# Patient Record
Sex: FEMALE | Race: WHITE | NOT HISPANIC OR LATINO | URBAN - METROPOLITAN AREA
[De-identification: names, ages, dates, MRNs, and addresses within clinical notes are randomized per-mention and may not be internally consistent; named-entity substitution may affect disease eponyms.]

---

## 2017-02-02 ENCOUNTER — OUTPATIENT (OUTPATIENT)
Dept: OUTPATIENT SERVICES | Facility: HOSPITAL | Age: 77
LOS: 1 days | End: 2017-02-02
Payer: MEDICARE

## 2017-02-02 ENCOUNTER — APPOINTMENT (OUTPATIENT)
Dept: ULTRASOUND IMAGING | Facility: IMAGING CENTER | Age: 77
End: 2017-02-02

## 2017-02-02 DIAGNOSIS — Z00.8 ENCOUNTER FOR OTHER GENERAL EXAMINATION: ICD-10-CM

## 2017-02-02 PROBLEM — Z00.00 ENCOUNTER FOR PREVENTIVE HEALTH EXAMINATION: Status: ACTIVE | Noted: 2017-02-02

## 2017-02-02 PROCEDURE — 93970 EXTREMITY STUDY: CPT

## 2017-02-21 ENCOUNTER — APPOINTMENT (OUTPATIENT)
Dept: VASCULAR SURGERY | Facility: CLINIC | Age: 77
End: 2017-02-21

## 2017-02-21 VITALS
HEIGHT: 66 IN | BODY MASS INDEX: 31.34 KG/M2 | DIASTOLIC BLOOD PRESSURE: 73 MMHG | WEIGHT: 195 LBS | TEMPERATURE: 97.6 F | HEART RATE: 77 BPM | SYSTOLIC BLOOD PRESSURE: 145 MMHG

## 2017-02-21 RX ORDER — DILTIAZEM HYDROCHLORIDE 180 MG/1
180 CAPSULE, COATED, EXTENDED RELEASE ORAL
Refills: 0 | Status: ACTIVE | COMMUNITY

## 2017-02-21 RX ORDER — ASPIRIN 81 MG
81 TABLET, DELAYED RELEASE (ENTERIC COATED) ORAL
Refills: 0 | Status: ACTIVE | COMMUNITY

## 2017-02-21 RX ORDER — OMEPRAZOLE MAGNESIUM 10 MG/1
GRANULE, DELAYED RELEASE ORAL
Refills: 0 | Status: ACTIVE | COMMUNITY

## 2017-02-21 RX ORDER — POTASSIUM CHLORIDE 1500 MG/1
20 TABLET, FILM COATED, EXTENDED RELEASE ORAL
Refills: 0 | Status: ACTIVE | COMMUNITY

## 2017-02-21 RX ORDER — OXYCODONE HYDROCHLORIDE AND ACETAMINOPHEN 10; 325 MG/1; MG/1
TABLET ORAL
Refills: 0 | Status: ACTIVE | COMMUNITY

## 2017-02-21 RX ORDER — PRAMIPEXOLE DIHYDROCHLORIDE 0.5 MG/1
0.5 TABLET ORAL
Refills: 0 | Status: ACTIVE | COMMUNITY

## 2017-02-21 RX ORDER — DULOXETINE HYDROCHLORIDE 60 MG/1
60 CAPSULE, DELAYED RELEASE ORAL
Refills: 0 | Status: ACTIVE | COMMUNITY

## 2017-02-21 RX ORDER — FUROSEMIDE 40 MG/1
40 TABLET ORAL
Refills: 0 | Status: ACTIVE | COMMUNITY

## 2017-03-02 ENCOUNTER — APPOINTMENT (OUTPATIENT)
Dept: CT IMAGING | Facility: IMAGING CENTER | Age: 77
End: 2017-03-02

## 2017-08-08 ENCOUNTER — INPATIENT (INPATIENT)
Facility: HOSPITAL | Age: 77
LOS: 2 days | Discharge: HOME | DRG: 301 | End: 2017-08-11
Attending: INTERNAL MEDICINE | Admitting: INTERNAL MEDICINE
Payer: MEDICARE

## 2017-08-08 VITALS
SYSTOLIC BLOOD PRESSURE: 141 MMHG | RESPIRATION RATE: 18 BRPM | OXYGEN SATURATION: 95 % | HEART RATE: 86 BPM | DIASTOLIC BLOOD PRESSURE: 79 MMHG | WEIGHT: 160.06 LBS | TEMPERATURE: 98 F

## 2017-08-08 DIAGNOSIS — Z98.890 OTHER SPECIFIED POSTPROCEDURAL STATES: Chronic | ICD-10-CM

## 2017-08-08 DIAGNOSIS — Z90.710 ACQUIRED ABSENCE OF BOTH CERVIX AND UTERUS: Chronic | ICD-10-CM

## 2017-08-08 DIAGNOSIS — Z98.891 HISTORY OF UTERINE SCAR FROM PREVIOUS SURGERY: Chronic | ICD-10-CM

## 2017-08-08 DIAGNOSIS — Z96.649 PRESENCE OF UNSPECIFIED ARTIFICIAL HIP JOINT: Chronic | ICD-10-CM

## 2017-08-08 DIAGNOSIS — Z90.11 ACQUIRED ABSENCE OF RIGHT BREAST AND NIPPLE: Chronic | ICD-10-CM

## 2017-08-08 DIAGNOSIS — H26.9 UNSPECIFIED CATARACT: Chronic | ICD-10-CM

## 2017-08-08 DIAGNOSIS — L03.90 CELLULITIS, UNSPECIFIED: ICD-10-CM

## 2017-08-08 LAB
ANION GAP SERPL CALC-SCNC: 15 MMOL/L — SIGNIFICANT CHANGE UP (ref 5–17)
BASOPHILS # BLD AUTO: 0 K/UL — SIGNIFICANT CHANGE UP (ref 0–0.2)
BASOPHILS NFR BLD AUTO: 0.2 % — SIGNIFICANT CHANGE UP (ref 0–2)
BUN SERPL-MCNC: 14 MG/DL — SIGNIFICANT CHANGE UP (ref 7–23)
CALCIUM SERPL-MCNC: 9.3 MG/DL — SIGNIFICANT CHANGE UP (ref 8.4–10.5)
CHLORIDE SERPL-SCNC: 100 MMOL/L — SIGNIFICANT CHANGE UP (ref 96–108)
CO2 SERPL-SCNC: 28 MMOL/L — SIGNIFICANT CHANGE UP (ref 22–31)
CREAT SERPL-MCNC: 0.6 MG/DL — SIGNIFICANT CHANGE UP (ref 0.5–1.3)
EOSINOPHIL # BLD AUTO: 0.2 K/UL — SIGNIFICANT CHANGE UP (ref 0–0.5)
EOSINOPHIL NFR BLD AUTO: 1.8 % — SIGNIFICANT CHANGE UP (ref 0–6)
GLUCOSE SERPL-MCNC: 95 MG/DL — SIGNIFICANT CHANGE UP (ref 70–99)
HCT VFR BLD CALC: 36.9 % — SIGNIFICANT CHANGE UP (ref 34.5–45)
HGB BLD-MCNC: 12.3 G/DL — SIGNIFICANT CHANGE UP (ref 11.5–15.5)
LYMPHOCYTES # BLD AUTO: 1.5 K/UL — SIGNIFICANT CHANGE UP (ref 1–3.3)
LYMPHOCYTES # BLD AUTO: 16.6 % — SIGNIFICANT CHANGE UP (ref 13–44)
MCHC RBC-ENTMCNC: 30.5 PG — SIGNIFICANT CHANGE UP (ref 27–34)
MCHC RBC-ENTMCNC: 33.3 GM/DL — SIGNIFICANT CHANGE UP (ref 32–36)
MCV RBC AUTO: 91.6 FL — SIGNIFICANT CHANGE UP (ref 80–100)
MONOCYTES # BLD AUTO: 0.7 K/UL — SIGNIFICANT CHANGE UP (ref 0–0.9)
MONOCYTES NFR BLD AUTO: 8.2 % — SIGNIFICANT CHANGE UP (ref 2–14)
NEUTROPHILS # BLD AUTO: 6.5 K/UL — SIGNIFICANT CHANGE UP (ref 1.8–7.4)
NEUTROPHILS NFR BLD AUTO: 73.2 % — SIGNIFICANT CHANGE UP (ref 43–77)
PLATELET # BLD AUTO: 193 K/UL — SIGNIFICANT CHANGE UP (ref 150–400)
POTASSIUM SERPL-MCNC: 3.3 MMOL/L — LOW (ref 3.5–5.3)
POTASSIUM SERPL-SCNC: 3.3 MMOL/L — LOW (ref 3.5–5.3)
RBC # BLD: 4.02 M/UL — SIGNIFICANT CHANGE UP (ref 3.8–5.2)
RBC # FLD: 12.7 % — SIGNIFICANT CHANGE UP (ref 10.3–14.5)
SODIUM SERPL-SCNC: 143 MMOL/L — SIGNIFICANT CHANGE UP (ref 135–145)
WBC # BLD: 8.9 K/UL — SIGNIFICANT CHANGE UP (ref 3.8–10.5)
WBC # FLD AUTO: 8.9 K/UL — SIGNIFICANT CHANGE UP (ref 3.8–10.5)

## 2017-08-08 PROCEDURE — 99223 1ST HOSP IP/OBS HIGH 75: CPT

## 2017-08-08 PROCEDURE — 99285 EMERGENCY DEPT VISIT HI MDM: CPT | Mod: GC

## 2017-08-08 RX ORDER — ACETAMINOPHEN 500 MG
975 TABLET ORAL ONCE
Qty: 0 | Refills: 0 | Status: COMPLETED | OUTPATIENT
Start: 2017-08-08 | End: 2017-08-08

## 2017-08-08 RX ORDER — CEFAZOLIN SODIUM 1 G
VIAL (EA) INJECTION
Qty: 0 | Refills: 0 | Status: DISCONTINUED | OUTPATIENT
Start: 2017-08-08 | End: 2017-08-08

## 2017-08-08 RX ORDER — CEFAZOLIN SODIUM 1 G
1000 VIAL (EA) INJECTION ONCE
Qty: 0 | Refills: 0 | Status: COMPLETED | OUTPATIENT
Start: 2017-08-08 | End: 2017-08-08

## 2017-08-08 RX ORDER — OXYCODONE HYDROCHLORIDE 5 MG/1
5 TABLET ORAL ONCE
Qty: 0 | Refills: 0 | Status: DISCONTINUED | OUTPATIENT
Start: 2017-08-08 | End: 2017-08-08

## 2017-08-08 RX ADMIN — OXYCODONE HYDROCHLORIDE 5 MILLIGRAM(S): 5 TABLET ORAL at 23:55

## 2017-08-08 RX ADMIN — OXYCODONE HYDROCHLORIDE 5 MILLIGRAM(S): 5 TABLET ORAL at 23:25

## 2017-08-08 RX ADMIN — Medication 100 MILLIGRAM(S): at 22:23

## 2017-08-08 NOTE — ED PROVIDER NOTE - NS ED ROS FT
Gen: Denies fevers, chills  HEENT: Denies oral ulcers  CV: Denies chest pain, palpitations  Resp: Denies SOB  Abd: Denies abdominal pain, N/V, melena  Skin: Denies rashes  Ext: SEE HPI  Neuro: Denies headaches, visual changes  Psych: Denies depression  Endo: Denies heat intolerance

## 2017-08-08 NOTE — ED PROVIDER NOTE - MEDICAL DECISION MAKING DETAILS
78yo F with chronic LE edema of unclear etiology, p/w erythema and worsening edema x 2 weeks. LLE> RLE, well-demarcated, warm to touch, may be 2/2 lymphedema vs. stasis dermatitis, but also concern for cellulitis vs. erysipelas (given bilaterality, less likely but given clinical sxs and appearance, cannot r/o). No purulence or ulcers. Will start ancef 1g. Discussed with Dr. Jaramillo, admit to medicine.

## 2017-08-08 NOTE — H&P ADULT - NSHPSOCIALHISTORY_GEN_ALL_CORE
- lives with - lives with  and daughter, retired manager of [employer]  - former 60 pack year smoker, quit 1993, social EtOH

## 2017-08-08 NOTE — ED PROVIDER NOTE - OBJECTIVE STATEMENT
76yo F severe bilateral knee arthritis, scoliosis, chronic LE edema x 1 year, p/w bilateral LE redness and worsening swelling x 2 weeks. Pt has chronic LE edema on lasix, dopplers in Feb 2017 without DVT, seen by Vascular Sx Dr. Burgos at the time. Pt states etiology of LE edema unknown, was told it is likely from severe knee arthritis. States LE redness has been progressing for the past 2 weeks. Has been unable to ambulate independently x 1 year, now uses walker and wheelchair. Denies fevers, chills, nausea, vomiting.  Sent by PMD today.    PMD: Dr. Nydia Jaramillo

## 2017-08-08 NOTE — H&P ADULT - FAMILY HISTORY
Sibling  Still living? Yes, Estimated age: Age Unknown  Family history of breast cancer, Age at diagnosis: Age Unknown  Family history of Hodgkin's disease, Age at diagnosis: Age Unknown     Father  Still living? No  Family history of emphysema, Age at diagnosis: Age Unknown     Mother  Still living? No  Family history of lung cancer, Age at diagnosis: Age Unknown

## 2017-08-08 NOTE — ED ADULT NURSE NOTE - OBJECTIVE STATEMENT
Pt 76 yo F arrived w/ via wheelchair from triage c/o of BLE swelling x2 wks has been getting increasingly worst. Pt LE edema  on lasix  x 1 yr now. Pt u Pt 76 yo F arrived w/ via wheelchair from triage c/o of BLE swelling x2 wks has been getting increasingly worst. Pt LE edema  on lasix  x 1 yr now. Pt is being followed by Vascular MD. Reports having Scoliosis and Knee arthritis. C/o of pain and takes oxycodone at home. Denies Cp dizziness weakness or SOB. No fever or chills. Denies NVD. Pmh of mastectomy on L side. MD at bedside. No acute distress noted. Labs drawn, and Cultures sent. IV line in place.

## 2017-08-08 NOTE — H&P ADULT - PROBLEM SELECTOR PLAN 1
last hospital admission 8 yrs ago for mastectomy, no exposure to healthcare facility  - will treat with Ancef for now and monitor for clinical response, will consider broad spectrum if no response  - will monitor BCx if febrile

## 2017-08-08 NOTE — H&P ADULT - PROBLEM SELECTOR PLAN 4
resulting in shortening of LLE significantly, and chronic pain   - s/p epidural early july, pain free for 2 wks  - will treat with Cymbalta, Oxycodone 10 QID prn for now with bowel regimen

## 2017-08-08 NOTE — ED ADULT NURSE NOTE - CAS EDP DISCH DISPOSITION ADMI
Nurse call patient contacted regarding appointment on 1/20/17.  Patient seeing Dr. Cormier for a consultation request from Dr. PAYAL Milian for environmental allergies.  Asthma.  Medications to hold prior to appointment reviewed in the event that the physician recommends allergy skin testing.  Advised patient to arrive at 0845 so that they will be ready to be seen by the doctor at the 0900 appointment time.  Advised patient to bring completed health history packet to appointment.  Clinic location confirmed.          [facility]

## 2017-08-08 NOTE — H&P ADULT - EXTREMITIES COMMENTS
erythema from ankle towards knee L>R, LLE larger and longer than RLE (chronic per patient due to scoliosis)

## 2017-08-08 NOTE — H&P ADULT - PROBLEM SELECTOR PLAN 2
- will treat with Lasix. elevation - will treat with Lasix. elevation  - LLE > RLE, chronic per patient, but Hx remote PE, breast ca and mobility limited due to LE edema for the past year.  Will check LE doppler to r/o DVT

## 2017-08-08 NOTE — ED ADULT NURSE NOTE - PSH
Cataract    H/O  section    H/O mastectomy, right    H/O shoulder surgery    H/O: hysterectomy    History of hip replacement

## 2017-08-08 NOTE — ED ADULT NURSE REASSESSMENT NOTE - NS ED NURSE REASSESS COMMENT FT1
pt aaox4 no acute distress. Refused tylenol. Labs and cultures sent. Report given to RENÉE garcía. Patient taken by transport in stable condition.

## 2017-08-08 NOTE — ED ADULT NURSE NOTE - CHPI ED SYMPTOMS NEG
no chills/no cough/no shortness of breath/no diarrhea/no vomiting/no rash/no decreased eating/drinking/no abdominal pain/no headache/no fever

## 2017-08-08 NOTE — ED PROVIDER NOTE - PHYSICAL EXAMINATION
Gen: NAD  HEENT: moist mucus membranes, no oral ulcers  CV: +S1S2 RRR no murmurs, rubs or gallops  Chest: CTA bilaterally  Abd: BS+ in all 4 quadrants, Soft, NTND  Ext: LLE > RLE, LLE with 3+ pitting edema, RLE with 2+ pitting edema, erythema in bilateral legs to mid-leg, warm to touch, tender to touch, no crepitus, no ulcers, no purulence.   Neuro: AAOx3

## 2017-08-08 NOTE — H&P ADULT - HISTORY OF PRESENT ILLNESS
76yo F severe bilateral knee arthritis, scoliosis, chronic LE edema x 1 year, p/w bilateral LE redness and worsening swelling x 2 weeks. Pt has chronic LE edema on lasix, dopplers in Feb 2017 without DVT, seen by Vascular Sx Dr. Burgos at the time. Pt states etiology of LE edema unknown, was told it is likely from severe knee arthritis. States LE redness has been progressing for the past 2 weeks. Has been unable to ambulate independently x 1 year, now uses walker and wheelchair. Denies fevers, chills, nausea, vomiting.  Sent by PMD today 78yo F bilateral knee arthritis,  severe scoliosis resulting in LLE short leg, chronic LE edema x 1 year, HTN, remote breast ca s/p mastectomy and LN resection, provoked PE 1993 p/w worsening bilateral LE redness, swelling and pain x 2 weeks. Pt has chronic LE edema on lasix, dopplers in Feb 2017 without DVT, seen by Vascular Sx Dr. Burgos, etiology unclear. Patient stated to have epidural injection July 6, felt great, but noted LE edema with serous discharge from water blisters.  Then, LE redness began at the ankle and progressed up for the past 2 weeks. Patient has been unable to ambulate independently x 1 year, now uses walker and wheelchair. Denies fevers, chills, nausea, vomiting.  Daughter called PMD who sent the patient to ED for further evaluation.

## 2017-08-09 DIAGNOSIS — I10 ESSENTIAL (PRIMARY) HYPERTENSION: ICD-10-CM

## 2017-08-09 DIAGNOSIS — L03.119 CELLULITIS OF UNSPECIFIED PART OF LIMB: ICD-10-CM

## 2017-08-09 DIAGNOSIS — R60.0 LOCALIZED EDEMA: ICD-10-CM

## 2017-08-09 DIAGNOSIS — Z29.9 ENCOUNTER FOR PROPHYLACTIC MEASURES, UNSPECIFIED: ICD-10-CM

## 2017-08-09 DIAGNOSIS — M41.9 SCOLIOSIS, UNSPECIFIED: ICD-10-CM

## 2017-08-09 DIAGNOSIS — C50.919 MALIGNANT NEOPLASM OF UNSPECIFIED SITE OF UNSPECIFIED FEMALE BREAST: ICD-10-CM

## 2017-08-09 DIAGNOSIS — K59.00 CONSTIPATION, UNSPECIFIED: ICD-10-CM

## 2017-08-09 LAB
ANION GAP SERPL CALC-SCNC: 14 MMOL/L — SIGNIFICANT CHANGE UP (ref 5–17)
BASOPHILS # BLD AUTO: 0.01 K/UL — SIGNIFICANT CHANGE UP (ref 0–0.2)
BASOPHILS NFR BLD AUTO: 0.2 % — SIGNIFICANT CHANGE UP (ref 0–2)
BUN SERPL-MCNC: 10 MG/DL — SIGNIFICANT CHANGE UP (ref 7–23)
CALCIUM SERPL-MCNC: 8.8 MG/DL — SIGNIFICANT CHANGE UP (ref 8.4–10.5)
CHLORIDE SERPL-SCNC: 97 MMOL/L — SIGNIFICANT CHANGE UP (ref 96–108)
CO2 SERPL-SCNC: 29 MMOL/L — SIGNIFICANT CHANGE UP (ref 22–31)
CREAT SERPL-MCNC: 0.59 MG/DL — SIGNIFICANT CHANGE UP (ref 0.5–1.3)
EOSINOPHIL # BLD AUTO: 0.17 K/UL — SIGNIFICANT CHANGE UP (ref 0–0.5)
EOSINOPHIL NFR BLD AUTO: 2.7 % — SIGNIFICANT CHANGE UP (ref 0–6)
GLUCOSE SERPL-MCNC: 119 MG/DL — HIGH (ref 70–99)
HCT VFR BLD CALC: 35.2 % — SIGNIFICANT CHANGE UP (ref 34.5–45)
HGB BLD-MCNC: 11.2 G/DL — LOW (ref 11.5–15.5)
IMM GRANULOCYTES NFR BLD AUTO: 0.2 % — SIGNIFICANT CHANGE UP (ref 0–1.5)
LYMPHOCYTES # BLD AUTO: 1.36 K/UL — SIGNIFICANT CHANGE UP (ref 1–3.3)
LYMPHOCYTES # BLD AUTO: 21.9 % — SIGNIFICANT CHANGE UP (ref 13–44)
MCHC RBC-ENTMCNC: 27.9 PG — SIGNIFICANT CHANGE UP (ref 27–34)
MCHC RBC-ENTMCNC: 31.8 GM/DL — LOW (ref 32–36)
MCV RBC AUTO: 87.8 FL — SIGNIFICANT CHANGE UP (ref 80–100)
MONOCYTES # BLD AUTO: 0.71 K/UL — SIGNIFICANT CHANGE UP (ref 0–0.9)
MONOCYTES NFR BLD AUTO: 11.5 % — SIGNIFICANT CHANGE UP (ref 2–14)
NEUTROPHILS # BLD AUTO: 3.94 K/UL — SIGNIFICANT CHANGE UP (ref 1.8–7.4)
NEUTROPHILS NFR BLD AUTO: 63.5 % — SIGNIFICANT CHANGE UP (ref 43–77)
PLATELET # BLD AUTO: 236 K/UL — SIGNIFICANT CHANGE UP (ref 150–400)
POTASSIUM SERPL-MCNC: 3.3 MMOL/L — LOW (ref 3.5–5.3)
POTASSIUM SERPL-SCNC: 3.3 MMOL/L — LOW (ref 3.5–5.3)
RBC # BLD: 4.01 M/UL — SIGNIFICANT CHANGE UP (ref 3.8–5.2)
RBC # FLD: 14.3 % — SIGNIFICANT CHANGE UP (ref 10.3–14.5)
SODIUM SERPL-SCNC: 140 MMOL/L — SIGNIFICANT CHANGE UP (ref 135–145)
WBC # BLD: 6.2 K/UL — SIGNIFICANT CHANGE UP (ref 3.8–10.5)
WBC # FLD AUTO: 6.2 K/UL — SIGNIFICANT CHANGE UP (ref 3.8–10.5)

## 2017-08-09 PROCEDURE — 93010 ELECTROCARDIOGRAM REPORT: CPT

## 2017-08-09 RX ORDER — BACLOFEN 100 %
0 POWDER (GRAM) MISCELLANEOUS
Qty: 0 | Refills: 0 | COMMUNITY

## 2017-08-09 RX ORDER — BACLOFEN 100 %
10 POWDER (GRAM) MISCELLANEOUS DAILY
Qty: 0 | Refills: 0 | Status: DISCONTINUED | OUTPATIENT
Start: 2017-08-09 | End: 2017-08-11

## 2017-08-09 RX ORDER — OMEPRAZOLE 10 MG/1
0 CAPSULE, DELAYED RELEASE ORAL
Qty: 0 | Refills: 0 | COMMUNITY

## 2017-08-09 RX ORDER — FUROSEMIDE 40 MG
0 TABLET ORAL
Qty: 0 | Refills: 0 | COMMUNITY

## 2017-08-09 RX ORDER — PANTOPRAZOLE SODIUM 20 MG/1
40 TABLET, DELAYED RELEASE ORAL
Qty: 0 | Refills: 0 | Status: DISCONTINUED | OUTPATIENT
Start: 2017-08-09 | End: 2017-08-11

## 2017-08-09 RX ORDER — CEVIMELINE 30 MG/1
0 CAPSULE ORAL
Qty: 0 | Refills: 0 | COMMUNITY

## 2017-08-09 RX ORDER — TRAZODONE HCL 50 MG
0 TABLET ORAL
Qty: 0 | Refills: 0 | COMMUNITY

## 2017-08-09 RX ORDER — DULOXETINE HYDROCHLORIDE 30 MG/1
0 CAPSULE, DELAYED RELEASE ORAL
Qty: 0 | Refills: 0 | COMMUNITY

## 2017-08-09 RX ORDER — OXYCODONE HYDROCHLORIDE 5 MG/1
10 TABLET ORAL EVERY 6 HOURS
Qty: 0 | Refills: 0 | Status: DISCONTINUED | OUTPATIENT
Start: 2017-08-09 | End: 2017-08-11

## 2017-08-09 RX ORDER — DILTIAZEM HCL 120 MG
180 CAPSULE, EXT RELEASE 24 HR ORAL DAILY
Qty: 0 | Refills: 0 | Status: DISCONTINUED | OUTPATIENT
Start: 2017-08-09 | End: 2017-08-11

## 2017-08-09 RX ORDER — ENOXAPARIN SODIUM 100 MG/ML
40 INJECTION SUBCUTANEOUS DAILY
Qty: 0 | Refills: 0 | Status: DISCONTINUED | OUTPATIENT
Start: 2017-08-09 | End: 2017-08-11

## 2017-08-09 RX ORDER — TRAZODONE HCL 50 MG
50 TABLET ORAL AT BEDTIME
Qty: 0 | Refills: 0 | Status: DISCONTINUED | OUTPATIENT
Start: 2017-08-09 | End: 2017-08-11

## 2017-08-09 RX ORDER — ASPIRIN/CALCIUM CARB/MAGNESIUM 324 MG
81 TABLET ORAL DAILY
Qty: 0 | Refills: 0 | Status: DISCONTINUED | OUTPATIENT
Start: 2017-08-09 | End: 2017-08-11

## 2017-08-09 RX ORDER — FUROSEMIDE 40 MG
40 TABLET ORAL DAILY
Qty: 0 | Refills: 0 | Status: DISCONTINUED | OUTPATIENT
Start: 2017-08-09 | End: 2017-08-11

## 2017-08-09 RX ORDER — POTASSIUM CHLORIDE 20 MEQ
40 PACKET (EA) ORAL ONCE
Qty: 0 | Refills: 0 | Status: COMPLETED | OUTPATIENT
Start: 2017-08-09 | End: 2017-08-09

## 2017-08-09 RX ORDER — CEFAZOLIN SODIUM 1 G
1000 VIAL (EA) INJECTION EVERY 8 HOURS
Qty: 0 | Refills: 0 | Status: DISCONTINUED | OUTPATIENT
Start: 2017-08-09 | End: 2017-08-10

## 2017-08-09 RX ORDER — DULOXETINE HYDROCHLORIDE 30 MG/1
60 CAPSULE, DELAYED RELEASE ORAL DAILY
Qty: 0 | Refills: 0 | Status: DISCONTINUED | OUTPATIENT
Start: 2017-08-09 | End: 2017-08-11

## 2017-08-09 RX ORDER — OXYCODONE HYDROCHLORIDE 5 MG/1
0 TABLET ORAL
Qty: 0 | Refills: 0 | COMMUNITY

## 2017-08-09 RX ORDER — POTASSIUM CHLORIDE 20 MEQ
40 PACKET (EA) ORAL EVERY 4 HOURS
Qty: 0 | Refills: 0 | Status: COMPLETED | OUTPATIENT
Start: 2017-08-09 | End: 2017-08-09

## 2017-08-09 RX ORDER — LUBIPROSTONE 24 UG/1
0 CAPSULE, GELATIN COATED ORAL
Qty: 0 | Refills: 0 | COMMUNITY

## 2017-08-09 RX ADMIN — OXYCODONE HYDROCHLORIDE 10 MILLIGRAM(S): 5 TABLET ORAL at 23:13

## 2017-08-09 RX ADMIN — Medication 40 MILLIEQUIVALENT(S): at 22:00

## 2017-08-09 RX ADMIN — OXYCODONE HYDROCHLORIDE 10 MILLIGRAM(S): 5 TABLET ORAL at 17:32

## 2017-08-09 RX ADMIN — Medication 10 MILLIGRAM(S): at 11:06

## 2017-08-09 RX ADMIN — Medication 40 MILLIGRAM(S): at 05:27

## 2017-08-09 RX ADMIN — OXYCODONE HYDROCHLORIDE 10 MILLIGRAM(S): 5 TABLET ORAL at 11:30

## 2017-08-09 RX ADMIN — OXYCODONE HYDROCHLORIDE 10 MILLIGRAM(S): 5 TABLET ORAL at 07:39

## 2017-08-09 RX ADMIN — OXYCODONE HYDROCHLORIDE 10 MILLIGRAM(S): 5 TABLET ORAL at 11:05

## 2017-08-09 RX ADMIN — Medication 40 MILLIEQUIVALENT(S): at 05:26

## 2017-08-09 RX ADMIN — OXYCODONE HYDROCHLORIDE 10 MILLIGRAM(S): 5 TABLET ORAL at 16:51

## 2017-08-09 RX ADMIN — Medication 81 MILLIGRAM(S): at 11:05

## 2017-08-09 RX ADMIN — Medication 50 MILLIGRAM(S): at 22:22

## 2017-08-09 RX ADMIN — Medication 100 MILLIGRAM(S): at 05:27

## 2017-08-09 RX ADMIN — ENOXAPARIN SODIUM 40 MILLIGRAM(S): 100 INJECTION SUBCUTANEOUS at 11:05

## 2017-08-09 RX ADMIN — DULOXETINE HYDROCHLORIDE 60 MILLIGRAM(S): 30 CAPSULE, DELAYED RELEASE ORAL at 11:06

## 2017-08-09 RX ADMIN — OXYCODONE HYDROCHLORIDE 10 MILLIGRAM(S): 5 TABLET ORAL at 05:26

## 2017-08-09 RX ADMIN — Medication 180 MILLIGRAM(S): at 05:45

## 2017-08-09 RX ADMIN — Medication 100 MILLIGRAM(S): at 11:05

## 2017-08-09 RX ADMIN — Medication 100 MILLIGRAM(S): at 22:21

## 2017-08-09 RX ADMIN — PANTOPRAZOLE SODIUM 40 MILLIGRAM(S): 20 TABLET, DELAYED RELEASE ORAL at 05:27

## 2017-08-09 NOTE — CONSULT NOTE ADULT - SUBJECTIVE AND OBJECTIVE BOX
Patient is a 77y old  Female who presents with a chief complaint of legs redness and pain (08 Aug 2017 23:37)      HPI:  76yo F bilateral knee arthritis,  severe scoliosis resulting in LLE short leg, chronic LE edema x 1 year, HTN, remote breast ca s/p mastectomy and LN resection, provoked PE 1993 p/w worsening bilateral LE redness, swelling and pain x 2 weeks. Pt has chronic LE edema on lasix, dopplers in 2017 without DVT, seen by Vascular Sx Dr. Burgos, etiology unclear. Patient stated to have epidural injection , felt great, but noted LE edema with serous discharge from water blisters.  Then, LE redness began at the ankle and progressed up for the past 2 weeks. Patient has been unable to ambulate independently x 1 year, now uses walker and wheelchair. Denies fevers, chills, nausea, vomiting.  Daughter called PMD who sent the patient to ED for further evaluation. (08 Aug 2017 23:37)           *****PAST MEDICAL / Surgical  HISTORY:  PAST MEDICAL & SURGICAL HISTORY:  Breast CA  Scoliosis  HTN (hypertension)  H/O shoulder surgery  Cataract  H/O: hysterectomy  H/O mastectomy, right  History of hip replacement  H/O  section           *****FAMILY HISTORY:  FAMILY HISTORY:  Family history of lung cancer (Mother): mother -   Family history of emphysema (Father): father -   Family history of Hodgkin's disease (Sibling): sisters - living  Family history of breast cancer (Sibling): sister living           *****SOCIAL HISTORY:  Alcohol: None  Smoking: None         *****ALLERGIES:   Allergies    No Known Allergies    Intolerances             *****MEDICATIONS:  MEDICATIONS  (STANDING):  diltiazem    milliGRAM(s) Oral daily  DULoxetine 60 milliGRAM(s) Oral daily  traZODone 50 milliGRAM(s) Oral at bedtime  furosemide    Tablet 40 milliGRAM(s) Oral daily  baclofen 10 milliGRAM(s) Oral daily  pantoprazole    Tablet 40 milliGRAM(s) Oral before breakfast  aspirin  chewable 81 milliGRAM(s) Oral daily  ceFAZolin   IVPB 1000 milliGRAM(s) IV Intermittent every 8 hours  enoxaparin Injectable 40 milliGRAM(s) SubCutaneous daily    MEDICATIONS  (PRN):  oxyCODONE    IR 10 milliGRAM(s) Oral every 6 hours PRN Moderate Pain (4 - 6)           *****REVIEW OF SYSTEM:  GEN: no fever, no chills, no pain  RESP: no SOB, no cough, no sputum  CVS: no chest pain, no palpitations, no edema  GI: no abdominal pain, no nausea, no vomiting, no constipation, no diarrhea  : no dysurea, no frequency, no hematurea  Neuro: no headache, no dizziness  PSYCH: no anxiety, no depression  Derm : no itching, no rash         *****VITAL SIGNS:  T(C): 37.2 (17 @ 04:50), Max: 37.2 (17 @ 04:50)  HR: 77 (17 @ 04:50) (63 - 86)  BP: 113/64 (17 @ 04:50) (113/64 - 141/79)  RR: 18 (17 @ 04:50) (18 - 18)  SpO2: 94% (17 @ 04:50) (94% - 98%)  Wt(kg): --     @ 07:01  -   @ 09:41  --------------------------------------------------------  IN: 280 mL / OUT: 400 mL / NET: -120 mL             *****PHYSICAL EXAM:  GEN: A&O X 3 , NAD , comfortable  HEENT: NCAT, PERRL, MMM, hearing intact  Neck: supple , no JVD  CVS: S1S2 , regular , No M/R/G appreciated  PULM: CTA B/L,  no W/R/R appreciated  ABD.: soft. non tender, non distended,  bowel sounds present  Extrem: intact pulses , no edema   Derm: No rash , no ecchymoses  PSYCH : normal mood,  no delusion not anxious         *****LAB AND IMAGIN.2   6.20  )-----------( 236      ( 09 Aug 2017 07:02 )             35.2                   140  |  97  |  10  ----------------------------<  119<H>  3.3<L>   |  29  |  0.59    Ca    8.8      09 Aug 2017 07:41                                  [All pertinent recent Imaging reports reviewed]         *****A S S E S S M E N T   A N D   P L A N :  77F cellulitis  started on ancef  ID eval  cont home meds  LE doppler r/o DVT  PT        ___________________________  Will follow with you.  Thank you,  ANTONIO Elliott D.O.

## 2017-08-10 LAB
HCT VFR BLD CALC: 29 % — LOW (ref 34.5–45)
HGB BLD-MCNC: 8.4 G/DL — LOW (ref 11.5–15.5)
MCHC RBC-ENTMCNC: 28.1 PG — SIGNIFICANT CHANGE UP (ref 27–34)
MCHC RBC-ENTMCNC: 29 GM/DL — LOW (ref 32–36)
MCV RBC AUTO: 97 FL — SIGNIFICANT CHANGE UP (ref 80–100)
PLATELET # BLD AUTO: 177 K/UL — SIGNIFICANT CHANGE UP (ref 150–400)
RBC # BLD: 2.99 M/UL — LOW (ref 3.8–5.2)
RBC # FLD: 15.3 % — HIGH (ref 10.3–14.5)
WBC # BLD: 4.08 K/UL — SIGNIFICANT CHANGE UP (ref 3.8–10.5)
WBC # FLD AUTO: 4.08 K/UL — SIGNIFICANT CHANGE UP (ref 3.8–10.5)

## 2017-08-10 PROCEDURE — 93970 EXTREMITY STUDY: CPT | Mod: 26

## 2017-08-10 RX ORDER — POTASSIUM CHLORIDE 20 MEQ
40 PACKET (EA) ORAL ONCE
Qty: 0 | Refills: 0 | Status: COMPLETED | OUTPATIENT
Start: 2017-08-10 | End: 2017-08-11

## 2017-08-10 RX ADMIN — Medication 40 MILLIEQUIVALENT(S): at 02:05

## 2017-08-10 RX ADMIN — PANTOPRAZOLE SODIUM 40 MILLIGRAM(S): 20 TABLET, DELAYED RELEASE ORAL at 05:31

## 2017-08-10 RX ADMIN — OXYCODONE HYDROCHLORIDE 10 MILLIGRAM(S): 5 TABLET ORAL at 18:04

## 2017-08-10 RX ADMIN — OXYCODONE HYDROCHLORIDE 10 MILLIGRAM(S): 5 TABLET ORAL at 00:00

## 2017-08-10 RX ADMIN — Medication 81 MILLIGRAM(S): at 12:04

## 2017-08-10 RX ADMIN — Medication 180 MILLIGRAM(S): at 05:31

## 2017-08-10 RX ADMIN — OXYCODONE HYDROCHLORIDE 10 MILLIGRAM(S): 5 TABLET ORAL at 12:00

## 2017-08-10 RX ADMIN — OXYCODONE HYDROCHLORIDE 10 MILLIGRAM(S): 5 TABLET ORAL at 06:15

## 2017-08-10 RX ADMIN — Medication 50 MILLIGRAM(S): at 21:01

## 2017-08-10 RX ADMIN — Medication 40 MILLIGRAM(S): at 05:31

## 2017-08-10 RX ADMIN — OXYCODONE HYDROCHLORIDE 10 MILLIGRAM(S): 5 TABLET ORAL at 05:31

## 2017-08-10 RX ADMIN — DULOXETINE HYDROCHLORIDE 60 MILLIGRAM(S): 30 CAPSULE, DELAYED RELEASE ORAL at 12:04

## 2017-08-10 RX ADMIN — Medication 10 MILLIGRAM(S): at 12:04

## 2017-08-10 RX ADMIN — ENOXAPARIN SODIUM 40 MILLIGRAM(S): 100 INJECTION SUBCUTANEOUS at 12:04

## 2017-08-10 RX ADMIN — Medication 100 MILLIGRAM(S): at 05:32

## 2017-08-10 NOTE — CONSULT NOTE ADULT - SUBJECTIVE AND OBJECTIVE BOX
Patient is a 77y old  Female who presents with a chief complaint of legs redness and pain (08 Aug 2017 23:37)    HPI:  78yo F bilateral knee arthritis,  severe scoliosis resulting in LLE short leg, chronic LE edema x 1 year, HTN, remote breast ca s/p mastectomy and LN resection, provoked PE  p/w worsening bilateral LE redness, swelling and pain x 2 weeks. Pt has chronic LE edema on lasix, dopplers in 2017 without DVT, seen by Vascular Sx Dr. Burgos, etiology unclear. Patient stated to have epidural injection , felt great, but noted LE edema with serous discharge from water blisters.  Then, LE redness began at the ankle and progressed up for the past 2 weeks. Patient has been unable to ambulate independently x 1 year, now uses walker and wheelchair. Denies fevers, chills, nausea, vomiting.  Daughter called PMD who sent the patient to ED for further evaluation. (08 Aug 2017 23:37)    No sick contacts. No recent travel. No recent antibiotics.     REVIEW OF SYSTEMS  All as below unless noted otherwise    General:  Denies fever and chills. 	  Ophthalmologic: Denies any visual complaints. 	  ENMT: No throat pain.  Respiratory: No cough, sputum. No shortness of breath.   Cardiovascular: No chest pain.   Gastrointestinal: No nausea or vomiting. No abdominal pain or diarrhea.   Genitourinary: No burning urine, no frequency. No flank pain  Musculoskeletal: No joint swelling or pain.   Neurological: No confusion. 	  Skin: No rash    PAST MEDICAL & SURGICAL HISTORY:  Breast CA  Scoliosis  HTN (hypertension)  H/O shoulder surgery  Cataract  H/O: hysterectomy  H/O mastectomy, right  History of hip replacement  H/O  section    FAMILY HISTORY:  Family history of lung cancer (Mother): mother -   Family history of emphysema (Father): father -   Family history of Hodgkin's disease (Sibling): sisters - living  Family history of breast cancer (Sibling): sister living    SOCIAL HISTORY:  non smoker      ANTIMICROBIALS:    ceFAZolin   IVPB 1000 every 8 hours      OTHER MEDS:    diltiazem    milliGRAM(s) Oral daily  DULoxetine 60 milliGRAM(s) Oral daily  traZODone 50 milliGRAM(s) Oral at bedtime  furosemide    Tablet 40 milliGRAM(s) Oral daily  baclofen 10 milliGRAM(s) Oral daily  pantoprazole    Tablet 40 milliGRAM(s) Oral before breakfast  oxyCODONE    IR 10 milliGRAM(s) Oral every 6 hours PRN  aspirin  chewable 81 milliGRAM(s) Oral daily  enoxaparin Injectable 40 milliGRAM(s) SubCutaneous daily      Allergies  No Known Allergies      Vital Signs Last 24 Hrs  T(C): 36.8 (10 Aug 2017 04:52), Max: 36.8 (10 Aug 2017 04:52)  T(F): 98.3 (10 Aug 2017 04:52), Max: 98.3 (10 Aug 2017 04:52)  HR: 57 (10 Aug 2017 04:52) (57 - 66)  BP: 115/62 (10 Aug 2017 04:52) (109/64 - 116/70)  RR: 18 (10 Aug 2017 04:52) (18 - 18)  SpO2: 96% (10 Aug 2017 04:52) (96% - 96%)        PHYSICAL EXAM:  patient in no acute respiratory distress.  Constitutional: Comfortable. Awake and alert  Eyes: PERRL EOMI. No discharge.  ENMT: No sinus tenderness.  No pharyngeal erythema.   Neck: Supple  Respiratory: Good air entry bilaterally, no wheezes, rhonchi, or crackles  Cardiovascular:S1 S2 wnl, No murmurs  Gastrointestinal: Soft, no tenderness , bowel sounds present,   Genitourinary: No suprapubic tenderness. no CVA tenderness   Musculoskeletal: No joint swelling. No edema.  Vascular: peripheral pulses felt  Neurological: No grossly focal deficits  Skin: No rash   Lymph Nodes: No palpable Lymphadenopathy   Psychiatric: Affect normal                            8.4    4.08  )-----------( 177      ( 10 Aug 2017 07:39 )             29.0     WBC Count: 4.08 (08-10 @ 07:39)  WBC Count: 6.20 ( @ 07:02)  WBC Count: 8.9 ( @ 22:01)        140  |  97  |  10  ----------------------------<  119<H>  3.3<L>   |  29  |  0.59    Ca    8.8      09 Aug 2017 07:41          MICROBIOLOGY:    Culture - Blood (17 @ 00:51)    Specimen Source: .Blood Blood-Venous    Culture Results:   No growth to date.    Culture - Blood (17 @ 00:51)    Specimen Source: .Blood Blood-Peripheral    Culture Results:   No growth to date.      RADIOLOGY:  none new Patient is a 77y old  Female who presents with a chief complaint of legs redness and pain (08 Aug 2017 23:37)    HPI:  78 y/o female with bilateral knee arthritis, severe scoliosis resulting in LLE short leg, chronic LE edema x 1 year, HTN, remote breast cancer s/p mastectomy and LN resection, provoked PE  p/w worsening bilateral LE redness, swelling and pain x 2 weeks. Pt has chronic LE edema on lasix, dopplers in 2017 without DVT, seen by Vascular Sx Dr. Burgos, etiology unclear. Patient stated to have epidural injection , felt great, but noted LE edema with serous discharge from water blisters.  Then, LE redness began at the ankle and progressed up for the past 2 weeks. Patient has been unable to ambulate independently x 1 year, now uses walker and wheelchair. Denies fevers, chills, nausea, vomiting.    No sick contacts. No recent travel. No recent antibiotics.     REVIEW OF SYSTEMS  All as below unless noted otherwise    General:  Denies fever and chills. 	  Ophthalmologic: Denies any visual complaints. 	  ENMT: No throat pain.  Respiratory: No cough, sputum. No shortness of breath.   Cardiovascular: No chest pain.   Gastrointestinal: No nausea or vomiting. No abdominal pain or diarrhea.   Genitourinary: No burning urine, no frequency. No flank pain  Musculoskeletal: No joint swelling or pain.   Neurological: No confusion. 	  Skin: No rash    PAST MEDICAL & SURGICAL HISTORY:  Breast CA  Scoliosis  HTN (hypertension)  H/O shoulder surgery  Cataract  H/O: hysterectomy  H/O mastectomy, right  History of hip replacement  H/O  section    FAMILY HISTORY:  Family history of lung cancer (Mother): mother -   Family history of emphysema (Father): father -   Family history of Hodgkin's disease (Sibling): sisters - living  Family history of breast cancer (Sibling): sister living    SOCIAL HISTORY:  non smoker      ANTIMICROBIALS:    ceFAZolin   IVPB 1000 every 8 hours      OTHER MEDS:    diltiazem    milliGRAM(s) Oral daily  DULoxetine 60 milliGRAM(s) Oral daily  traZODone 50 milliGRAM(s) Oral at bedtime  furosemide    Tablet 40 milliGRAM(s) Oral daily  baclofen 10 milliGRAM(s) Oral daily  pantoprazole    Tablet 40 milliGRAM(s) Oral before breakfast  oxyCODONE    IR 10 milliGRAM(s) Oral every 6 hours PRN  aspirin  chewable 81 milliGRAM(s) Oral daily  enoxaparin Injectable 40 milliGRAM(s) SubCutaneous daily      Allergies  No Known Allergies      Vital Signs Last 24 Hrs  T(C): 36.8 (10 Aug 2017 04:52), Max: 36.8 (10 Aug 2017 04:52)  T(F): 98.3 (10 Aug 2017 04:52), Max: 98.3 (10 Aug 2017 04:52)  HR: 57 (10 Aug 2017 04:52) (57 - 66)  BP: 115/62 (10 Aug 2017 04:52) (109/64 - 116/70)  RR: 18 (10 Aug 2017 04:52) (18 - 18)  SpO2: 96% (10 Aug 2017 04:52) (96% - 96%)        PHYSICAL EXAM:  patient in no acute respiratory distress.  Constitutional: Comfortable. Awake and alert  Eyes: PERRL EOMI. No discharge.  ENMT: No sinus tenderness.  No pharyngeal erythema.   Neck: Supple  Respiratory: Good air entry bilaterally, no wheezes, rhonchi, or crackles  Cardiovascular:S1 S2 wnl, No murmurs  Gastrointestinal: Soft, no tenderness , bowel sounds present,   Genitourinary: No suprapubic tenderness. no CVA tenderness   Musculoskeletal: No joint swelling. No edema.  Vascular: peripheral pulses felt  Neurological: No grossly focal deficits  Skin: No rash   Lymph Nodes: No palpable Lymphadenopathy   Psychiatric: Affect normal                            8.4    4.08  )-----------( 177      ( 10 Aug 2017 07:39 )             29.0     WBC Count: 4.08 (08-10 @ 07:39)  WBC Count: 6.20 ( @ 07:02)  WBC Count: 8.9 ( @ 22:01)        140  |  97  |  10  ----------------------------<  119<H>  3.3<L>   |  29  |  0.59    Ca    8.8      09 Aug 2017 07:41          MICROBIOLOGY:    Culture - Blood (17 @ 00:51)    Specimen Source: .Blood Blood-Venous    Culture Results:   No growth to date.    Culture - Blood (17 @ 00:51)    Specimen Source: .Blood Blood-Peripheral    Culture Results:   No growth to date.      RADIOLOGY:  none new Patient is a 77y old  Female who presents with a chief complaint of legs redness and pain (08 Aug 2017 23:37)    HPI:  76 y/o female with bilateral knee arthritis, severe scoliosis resulting in LLE short leg, chronic LE edema x 1 year, HTN, remote breast cancer s/p mastectomy and LN resection, provoked PE  p/w worsening bilateral LE redness, swelling and pain x 2 weeks. Pt has chronic LE edema on lasix, dopplers in 2017 without DVT, seen by Vascular Sx Dr. Burgos, etiology unclear. Patient stated to have epidural injection , felt great, but noted LE edema with serous discharge from water blisters.  Then, LE redness began at the ankle and progressed up for the past 2 weeks. Patient has been unable to ambulate independently x 1 year, now uses walker and wheelchair. Denies fevers, chills, nausea, vomiting.    No sick contacts. No recent travel. No recent antibiotics.   Patient has been afebrile this admission with no leukocytosis.   Blood culture show no growth to date.  ON cefazolin since 17.      REVIEW OF SYSTEMS  All as below unless noted otherwise    General:  Denies fever and chills. 	  Ophthalmologic: Denies any visual complaints. 	  ENMT: No throat pain.  Respiratory: No cough, sputum. No shortness of breath.   Cardiovascular: No chest pain.   Gastrointestinal: No nausea or vomiting. No abdominal pain or diarrhea.   Genitourinary: No burning urine, no frequency. No flank pain  Musculoskeletal: No joint swelling or pain.   Neurological: No confusion. 	  Skin: No rash    PAST MEDICAL & SURGICAL HISTORY:  Breast CA  Scoliosis  HTN (hypertension)  H/O shoulder surgery  Cataract  H/O: hysterectomy  H/O mastectomy, right  History of hip replacement  H/O  section    FAMILY HISTORY:  Family history of lung cancer (Mother): mother -   Family history of emphysema (Father): father -   Family history of Hodgkin's disease (Sibling): sisters - living  Family history of breast cancer (Sibling): sister living    SOCIAL HISTORY:  non smoker      ANTIMICROBIALS:    ceFAZolin   IVPB 1000 every 8 hours      OTHER MEDS:    diltiazem    milliGRAM(s) Oral daily  DULoxetine 60 milliGRAM(s) Oral daily  traZODone 50 milliGRAM(s) Oral at bedtime  furosemide    Tablet 40 milliGRAM(s) Oral daily  baclofen 10 milliGRAM(s) Oral daily  pantoprazole    Tablet 40 milliGRAM(s) Oral before breakfast  oxyCODONE    IR 10 milliGRAM(s) Oral every 6 hours PRN  aspirin  chewable 81 milliGRAM(s) Oral daily  enoxaparin Injectable 40 milliGRAM(s) SubCutaneous daily      Allergies  No Known Allergies      Vital Signs Last 24 Hrs  T(C): 36.8 (10 Aug 2017 04:52), Max: 36.8 (10 Aug 2017 04:52)  T(F): 98.3 (10 Aug 2017 04:52), Max: 98.3 (10 Aug 2017 04:52)  HR: 57 (10 Aug 2017 04:52) (57 - 66)  BP: 115/62 (10 Aug 2017 04:52) (109/64 - 116/70)  RR: 18 (10 Aug 2017 04:52) (18 - 18)  SpO2: 96% (10 Aug 2017 04:52) (96% - 96%)        PHYSICAL EXAM:  patient in no acute respiratory distress.  Constitutional: Comfortable. Awake and alert  Eyes: PERRL EOMI. No discharge.  ENMT: No sinus tenderness.  No pharyngeal erythema.   Neck: Supple  Respiratory: Good air entry bilaterally, no wheezes, rhonchi, or crackles  Cardiovascular:S1 S2 wnl, No murmurs  Gastrointestinal: Soft, no tenderness , bowel sounds present,   Genitourinary: No suprapubic tenderness. no CVA tenderness   Musculoskeletal: No joint swelling. No edema.  Vascular: peripheral pulses felt  Neurological: No grossly focal deficits  Skin: No rash   Lymph Nodes: No palpable Lymphadenopathy   Psychiatric: Affect normal                            8.4    4.08  )-----------( 177      ( 10 Aug 2017 07:39 )             29.0     WBC Count: 4.08 (08-10 @ 07:39)  WBC Count: 6.20 ( @ 07:02)  WBC Count: 8.9 ( @ 22:01)        140  |  97  |  10  ----------------------------<  119<H>  3.3<L>   |  29  |  0.59    Ca    8.8      09 Aug 2017 07:41          MICROBIOLOGY:    Culture - Blood (17 @ 00:51)    Specimen Source: .Blood Blood-Venous    Culture Results:   No growth to date.    Culture - Blood (17 @ 00:51)    Specimen Source: .Blood Blood-Peripheral    Culture Results:   No growth to date.      RADIOLOGY:  none new Patient is a 77y old  Female who presents with a chief complaint of legs redness and pain (08 Aug 2017 23:37)    HPI:  78 y/o female with bilateral knee arthritis, severe scoliosis resulting in LLE short leg, chronic LE edema x 1 year, HTN, remote breast cancer s/p mastectomy and LN resection, provoked PE 1993 p/w worsening bilateral LE redness, swelling and pain x 2 weeks. Pt has chronic LE edema on lasix, dopplers in 2017 without DVT, seen by Vascular Sx Dr. Burgos, etiology unclear. Patient stated to have epidural injection , felt great, but noted LE edema with serous discharge from water blisters.  Then, LE redness began at the ankle and progressed up for the past 2 weeks. Patient has been unable to ambulate independently x 1 year, now uses walker and wheelchair. Denies fevers, chills, nausea, vomiting.    No sick contacts. No recent travel. No recent antibiotics.   Patient has been afebrile this admission with no leukocytosis.   Blood culture show no growth to date.  On cefazolin since 17. She notes improvement in the leg. Left leg is worse than the right one.   She has a dog but no bites or licking of the legs notes. No trauma to the leg. she does not recall any inciting event.         REVIEW OF SYSTEMS  All as below unless noted otherwise    General:  Denies fever and chills. 	  Ophthalmologic: Denies any visual complaints. 	  ENMT: No throat pain.  Respiratory: No cough, sputum. No shortness of breath.   Cardiovascular: No chest pain.   Gastrointestinal: No nausea or vomiting. No abdominal pain or diarrhea.   Genitourinary: No burning urine, no frequency. No flank pain  Musculoskeletal: multiple joints in pain chronically  Neurological: No confusion. 	  Skin: bilateral leg swelling and pain     PAST MEDICAL & SURGICAL HISTORY:  Breast CA  Scoliosis  HTN (hypertension)  H/O shoulder surgery  Cataract  H/O: hysterectomy  H/O mastectomy, right  History of hip replacement  H/O  section    FAMILY HISTORY:  Family history of lung cancer (Mother): mother -   Family history of emphysema (Father): father -   Family history of Hodgkin's disease (Sibling): sisters - living  Family history of breast cancer (Sibling): sister living    SOCIAL HISTORY:  non smoker      ANTIMICROBIALS:    ceFAZolin   IVPB 1000 every 8 hours      OTHER MEDS:    diltiazem    milliGRAM(s) Oral daily  DULoxetine 60 milliGRAM(s) Oral daily  traZODone 50 milliGRAM(s) Oral at bedtime  furosemide    Tablet 40 milliGRAM(s) Oral daily  baclofen 10 milliGRAM(s) Oral daily  pantoprazole    Tablet 40 milliGRAM(s) Oral before breakfast  oxyCODONE    IR 10 milliGRAM(s) Oral every 6 hours PRN  aspirin  chewable 81 milliGRAM(s) Oral daily  enoxaparin Injectable 40 milliGRAM(s) SubCutaneous daily      Allergies  No Known Allergies      Vital Signs Last 24 Hrs  T(C): 36.8 (10 Aug 2017 04:52), Max: 36.8 (10 Aug 2017 04:52)  T(F): 98.3 (10 Aug 2017 04:52), Max: 98.3 (10 Aug 2017 04:52)  HR: 57 (10 Aug 2017 04:52) (57 - 66)  BP: 115/62 (10 Aug 2017 04:52) (109/64 - 116/70)  RR: 18 (10 Aug 2017 04:52) (18 - 18)  SpO2: 96% (10 Aug 2017 04:52) (96% - 96%)        PHYSICAL EXAM:  patient in no acute respiratory distress. obese   Constitutional: Comfortable. Awake and alert  Eyes: PERRL EOMI. No discharge.  ENMT: No sinus tenderness.  No pharyngeal erythema.   Neck: Supple  Respiratory: Good air entry bilaterally, no wheezes, rhonchi, or crackles  Cardiovascular:S1 S2 wnl, No murmurs  Gastrointestinal: Soft, no tenderness , bowel sounds present,   Genitourinary: No suprapubic tenderness. no CVA tenderness   Musculoskeletal: right lower extremity is shorter than the left  Vascular: peripheral pulses felt  Neurological: No grossly focal deficits  Skin: left leg is swollen nontender, erythematous right leg as well but less so   Lymph Nodes: No palpable Lymphadenopathy   Psychiatric: Affect normal                            8.4    4.08  )-----------( 177      ( 10 Aug 2017 07:39 )             29.0     WBC Count: 4.08 (08-10 @ 07:39)  WBC Count: 6.20 ( @ 07:02)  WBC Count: 8.9 ( @ 22:01)        140  |  97  |  10  ----------------------------<  119<H>  3.3<L>   |  29  |  0.59    Ca    8.8      09 Aug 2017 07:41          MICROBIOLOGY:    Culture - Blood (17 @ 00:51)    Specimen Source: .Blood Blood-Venous    Culture Results:   No growth to date.    Culture - Blood (17 @ 00:51)    Specimen Source: .Blood Blood-Peripheral    Culture Results:   No growth to date.      RADIOLOGY:  none new Patient is a 77y old  Female who presents with a chief complaint of legs redness and pain (08 Aug 2017 23:37)    HPI:  78 y/o female with bilateral knee arthritis, severe scoliosis resulting in LLE short leg, chronic LE edema x 1 year, HTN, remote breast cancer s/p mastectomy and LN resection, provoked PE 1993 p/w worsening bilateral LE redness, swelling and pain x 2 weeks. Pt has chronic LE edema on lasix, dopplers in 2017 without DVT, seen by Vascular Sx Dr. Burgos, etiology unclear. Patient stated to have epidural injection , felt great, but noted LE edema with serous discharge from water blisters.  Then, LE redness began at the ankle and progressed up for the past 2 weeks. Patient has been unable to ambulate independently x 1 year, now uses walker and wheelchair. Denies fevers, chills, nausea, vomiting.    No sick contacts. No recent travel. No recent antibiotics.   Patient has been afebrile this admission with no leukocytosis.   Blood culture show no growth to date.  On cefazolin since 17. She notes improvement in the leg. Left leg is worse than the right one.   She has a dog but no bites or licking of the legs notes. No trauma to the leg. she does not recall any inciting event.         REVIEW OF SYSTEMS  All as below unless noted otherwise    General:  Denies fever and chills. 	  Ophthalmologic: Denies any visual complaints. 	  ENMT: No throat pain.  Respiratory: No cough, sputum. No shortness of breath.   Cardiovascular: No chest pain.   Gastrointestinal: No nausea or vomiting. No abdominal pain or diarrhea.   Genitourinary: No burning urine, no frequency. No flank pain  Musculoskeletal: multiple joints in pain chronically  Neurological: No confusion. 	  Skin: bilateral leg swelling and pain     PAST MEDICAL & SURGICAL HISTORY:  Breast CA  Scoliosis  HTN (hypertension)  H/O shoulder surgery  Cataract  H/O: hysterectomy  H/O mastectomy, right  History of hip replacement  H/O  section    FAMILY HISTORY:  Family history of lung cancer (Mother): mother -   Family history of emphysema (Father): father -   Family history of Hodgkin's disease (Sibling): sisters - living  Family history of breast cancer (Sibling): sister living    SOCIAL HISTORY:  non smoker      ANTIMICROBIALS:    ceFAZolin   IVPB 1000 every 8 hours      OTHER MEDS:    diltiazem    milliGRAM(s) Oral daily  DULoxetine 60 milliGRAM(s) Oral daily  traZODone 50 milliGRAM(s) Oral at bedtime  furosemide    Tablet 40 milliGRAM(s) Oral daily  baclofen 10 milliGRAM(s) Oral daily  pantoprazole    Tablet 40 milliGRAM(s) Oral before breakfast  oxyCODONE    IR 10 milliGRAM(s) Oral every 6 hours PRN  aspirin  chewable 81 milliGRAM(s) Oral daily  enoxaparin Injectable 40 milliGRAM(s) SubCutaneous daily      Allergies  No Known Allergies      Vital Signs Last 24 Hrs  T(C): 36.8 (10 Aug 2017 04:52), Max: 36.8 (10 Aug 2017 04:52)  T(F): 98.3 (10 Aug 2017 04:52), Max: 98.3 (10 Aug 2017 04:52)  HR: 57 (10 Aug 2017 04:52) (57 - 66)  BP: 115/62 (10 Aug 2017 04:52) (109/64 - 116/70)  RR: 18 (10 Aug 2017 04:52) (18 - 18)  SpO2: 96% (10 Aug 2017 04:52) (96% - 96%)        PHYSICAL EXAM:  patient in no acute respiratory distress. obese   Constitutional: Comfortable. Awake and alert  Eyes: PERRL EOMI. No discharge.  ENMT: No sinus tenderness.  No pharyngeal erythema.   Neck: Supple  Respiratory: Good air entry bilaterally, no wheezes, rhonchi, or crackles  Cardiovascular:S1 S2 wnl, No murmurs  Gastrointestinal: Soft, no tenderness , bowel sounds present,   Genitourinary: No suprapubic tenderness. no CVA tenderness   Musculoskeletal: right lower extremity is shorter than the left- 2 +pitting edema left more than right   Vascular: peripheral pulses felt  Neurological: No grossly focal deficits  Skin: left leg is swollen nontender, erythematous right leg as well but less so   Lymph Nodes: No palpable Lymphadenopathy   Psychiatric: Affect normal                            8.4    4.08  )-----------( 177      ( 10 Aug 2017 07:39 )             29.0     WBC Count: 4.08 (08-10 @ 07:39)  WBC Count: 6.20 ( @ 07:02)  WBC Count: 8.9 ( @ 22:01)        140  |  97  |  10  ----------------------------<  119<H>  3.3<L>   |  29  |  0.59    Ca    8.8      09 Aug 2017 07:41          MICROBIOLOGY:    Culture - Blood (17 @ 00:51)    Specimen Source: .Blood Blood-Venous    Culture Results:   No growth to date.    Culture - Blood (17 @ 00:51)    Specimen Source: .Blood Blood-Peripheral    Culture Results:   No growth to date.      RADIOLOGY:  none new Patient is a 77y old  Female who presents with a chief complaint of legs redness and pain (08 Aug 2017 23:37)    HPI:  76 y/o female with bilateral knee arthritis, severe scoliosis resulting in LLE short leg, chronic LE edema x 1 year, HTN, remote breast cancer s/p mastectomy and LN resection, provoked PE 1993 p/w worsening bilateral LE redness, swelling and pain x 2 weeks. Pt has chronic LE edema on lasix, dopplers in 2017 without DVT, seen by Vascular Sx Dr. Burgos, etiology unclear. Patient stated to have epidural injection , felt great, but noted LE edema with serous discharge from water blisters.  Then, LE redness began at the ankle and progressed up for the past 2 weeks. Patient has been unable to ambulate independently x 1 year, now uses walker and wheelchair. Denies fevers, chills, nausea, vomiting.    No sick contacts. No recent travel. No recent antibiotics.   Patient has been afebrile this admission with no leukocytosis.   Blood culture show no growth to date.  On cefazolin since 17. She notes improvement in the leg. Left leg is worse than the right one.   She has a dog but no bites or licking of the legs notes. No trauma to the leg. she does not recall any inciting event.         REVIEW OF SYSTEMS  All as below unless noted otherwise    General:  Denies fever and chills. 	  Ophthalmologic: Denies any visual complaints. 	  ENMT: No throat pain.  Respiratory: No cough, sputum. No shortness of breath.   Cardiovascular: No chest pain.   Gastrointestinal: No nausea or vomiting. No abdominal pain or diarrhea.   Genitourinary: No burning urine, no frequency. No flank pain  Musculoskeletal: multiple joints in pain chronically  Neurological: No confusion. 	  Skin: bilateral leg swelling and pain     PAST MEDICAL & SURGICAL HISTORY:  Breast CA  Scoliosis  HTN (hypertension)  H/O shoulder surgery  Cataract  H/O: hysterectomy  H/O mastectomy, right  History of hip replacement  H/O  section    FAMILY HISTORY:  Family history of lung cancer (Mother): mother -   Family history of emphysema (Father): father -   Family history of Hodgkin's disease (Sibling): sisters - living  Family history of breast cancer (Sibling): sister living    SOCIAL HISTORY:  non smoker      ANTIMICROBIALS:    ceFAZolin   IVPB 1000 every 8 hours      OTHER MEDS:    diltiazem    milliGRAM(s) Oral daily  DULoxetine 60 milliGRAM(s) Oral daily  traZODone 50 milliGRAM(s) Oral at bedtime  furosemide    Tablet 40 milliGRAM(s) Oral daily  baclofen 10 milliGRAM(s) Oral daily  pantoprazole    Tablet 40 milliGRAM(s) Oral before breakfast  oxyCODONE    IR 10 milliGRAM(s) Oral every 6 hours PRN  aspirin  chewable 81 milliGRAM(s) Oral daily  enoxaparin Injectable 40 milliGRAM(s) SubCutaneous daily      Allergies  No Known Allergies      Vital Signs Last 24 Hrs  T(C): 36.8 (10 Aug 2017 04:52), Max: 36.8 (10 Aug 2017 04:52)  T(F): 98.3 (10 Aug 2017 04:52), Max: 98.3 (10 Aug 2017 04:52)  HR: 57 (10 Aug 2017 04:52) (57 - 66)  BP: 115/62 (10 Aug 2017 04:52) (109/64 - 116/70)  RR: 18 (10 Aug 2017 04:52) (18 - 18)  SpO2: 96% (10 Aug 2017 04:52) (96% - 96%)        PHYSICAL EXAM:  patient in no acute respiratory distress. obese   Constitutional: Comfortable. Awake and alert  Eyes: PERRL EOMI. No discharge.  ENMT: No sinus tenderness.  No pharyngeal erythema.   Neck: Supple  Respiratory: Good air entry bilaterally, no wheezes, rhonchi, or crackles  Cardiovascular:S1 S2 wnl, No murmurs  Gastrointestinal: Soft, no tenderness , bowel sounds present,   Genitourinary: No suprapubic tenderness. no CVA tenderness   Musculoskeletal: right lower extremity is shorter than the left- 2 +pitting edema left more than right   Vascular: peripheral pulses felt  Neurological: No grossly focal deficits  Skin: left leg is warm, swollen, nontender, erythematous right leg as well but less so   Lymph Nodes: No palpable Lymphadenopathy   Psychiatric: Affect normal                            8.4    4.08  )-----------( 177      ( 10 Aug 2017 07:39 )             29.0     WBC Count: 4.08 (08-10 @ 07:39)  WBC Count: 6.20 ( @ 07:02)  WBC Count: 8.9 ( @ 22:01)    08-09    140  |  97  |  10  ----------------------------<  119<H>  3.3<L>   |  29  |  0.59    Ca    8.8      09 Aug 2017 07:41          MICROBIOLOGY:    Culture - Blood (17 @ 00:51)    Specimen Source: .Blood Blood-Venous    Culture Results:   No growth to date.    Culture - Blood (17 @ 00:51)    Specimen Source: .Blood Blood-Peripheral    Culture Results:   No growth to date.      RADIOLOGY:  none new Patient is a 77y old  Female who presents with a chief complaint of legs redness and pain (08 Aug 2017 23:37)    HPI:  78 y/o female with bilateral knee arthritis, severe scoliosis resulting in LLE short leg, chronic LE edema x 1 year, HTN, remote breast cancer s/p mastectomy and LN resection, provoked PE  p/w worsening bilateral LE redness, swelling and pain x 2 weeks. Pt has chronic LE edema on lasix, dopplers in 2017 without DVT, seen by Vascular Sx Dr. Burgos, etiology unclear. Patient stated to have epidural injection , felt great, but noted LE edema with serous discharge from water blisters.  Then, LE redness began at the ankle and progressed up for the past 2 weeks. Patient has been unable to ambulate independently x 1 year, now uses walker and wheelchair. Denies fevers, chills, nausea, vomiting.    No sick contacts. No recent travel. No recent antibiotics.   Patient has been afebrile this admission with no leukocytosis.   Blood culture show no growth to date.  On cefazolin since 17. She notes improvement in the leg since hospitalization- also getting diuresis- says her legs are swollen because of the arthritis. Left leg is worse than the right one.   She has a dog but no bites or licking of the legs notes. No trauma to the leg. she does not recall any inciting event.         REVIEW OF SYSTEMS  All as below unless noted otherwise    General:  Denies fever and chills. 	  Ophthalmologic: Denies any visual complaints. 	  ENMT: No throat pain.  Respiratory: No cough, sputum. No shortness of breath.   Cardiovascular: No chest pain.   Gastrointestinal: No nausea or vomiting. No abdominal pain or diarrhea.   Genitourinary: No burning urine, no frequency. No flank pain  Musculoskeletal: multiple joints in pain chronically  Neurological: No confusion. 	  Skin: bilateral leg swelling and pain     PAST MEDICAL & SURGICAL HISTORY:  Breast CA  Scoliosis  HTN (hypertension)  H/O shoulder surgery  Cataract  H/O: hysterectomy  H/O mastectomy, right  History of hip replacement  H/O  section    FAMILY HISTORY:  Family history of lung cancer (Mother): mother -   Family history of emphysema (Father): father -   Family history of Hodgkin's disease (Sibling): sisters - living  Family history of breast cancer (Sibling): sister living    SOCIAL HISTORY:  non smoker      ANTIMICROBIALS:    ceFAZolin   IVPB 1000 every 8 hours      OTHER MEDS:    diltiazem    milliGRAM(s) Oral daily  DULoxetine 60 milliGRAM(s) Oral daily  traZODone 50 milliGRAM(s) Oral at bedtime  furosemide    Tablet 40 milliGRAM(s) Oral daily  baclofen 10 milliGRAM(s) Oral daily  pantoprazole    Tablet 40 milliGRAM(s) Oral before breakfast  oxyCODONE    IR 10 milliGRAM(s) Oral every 6 hours PRN  aspirin  chewable 81 milliGRAM(s) Oral daily  enoxaparin Injectable 40 milliGRAM(s) SubCutaneous daily      Allergies  No Known Allergies      Vital Signs Last 24 Hrs  T(C): 36.8 (10 Aug 2017 04:52), Max: 36.8 (10 Aug 2017 04:52)  T(F): 98.3 (10 Aug 2017 04:52), Max: 98.3 (10 Aug 2017 04:52)  HR: 57 (10 Aug 2017 04:52) (57 - 66)  BP: 115/62 (10 Aug 2017 04:52) (109/64 - 116/70)  RR: 18 (10 Aug 2017 04:52) (18 - 18)  SpO2: 96% (10 Aug 2017 04:52) (96% - 96%)        PHYSICAL EXAM:  patient in no acute respiratory distress. obese   Constitutional: Comfortable. Awake and alert  Eyes: PERRL EOMI. No discharge.  ENMT: No sinus tenderness.  No pharyngeal erythema.   Neck: Supple  Respiratory: Good air entry bilaterally, no wheezes, rhonchi, or crackles  Cardiovascular:S1 S2 wnl, No murmurs  Gastrointestinal: Soft, no tenderness , bowel sounds present,   Genitourinary: No suprapubic tenderness. no CVA tenderness   Musculoskeletal: right lower extremity is shorter than the left- 2 +pitting edema left more than right   Vascular: peripheral pulses felt  Neurological: No grossly focal deficits  Skin: left leg is warm, swollen, nontender, erythematous right leg as well but less so   Lymph Nodes: No palpable Lymphadenopathy   Psychiatric: Affect normal                            8.4    4.08  )-----------( 177      ( 10 Aug 2017 07:39 )             29.0     WBC Count: 4.08 (08-10 @ 07:39)  WBC Count: 6.20 ( @ 07:02)  WBC Count: 8.9 ( @ 22:01)        140  |  97  |  10  ----------------------------<  119<H>  3.3<L>   |  29  |  0.59    Ca    8.8      09 Aug 2017 07:41          MICROBIOLOGY:    Culture - Blood (17 @ 00:51)    Specimen Source: .Blood Blood-Venous    Culture Results:   No growth to date.    Culture - Blood (17 @ 00:51)    Specimen Source: .Blood Blood-Peripheral    Culture Results:   No growth to date.      RADIOLOGY:  none new Patient is a 77y old  Female who presents with a chief complaint of legs redness and pain (08 Aug 2017 23:37)    HPI:  76 y/o female with bilateral knee arthritis, severe scoliosis resulting in LLE short leg, chronic LE edema x 1 year, HTN, remote breast cancer s/p mastectomy and LN resection, provoked PE  p/w worsening bilateral LE redness, swelling and pain x 2 weeks. Pt has chronic LE edema on lasix, dopplers in 2017 without DVT, seen by Vascular Sx Dr. Burgos, etiology unclear. Patient stated to have epidural injection , felt great, but noted LE edema with serous discharge from water blisters.  Then, LE redness began at the ankle and progressed up for the past 2 weeks. Patient has been unable to ambulate independently x 1 year, now uses walker and wheelchair. Denies fevers, chills, nausea, vomiting.    No sick contacts. No recent travel. No recent antibiotics.   Patient has been afebrile this admission with no leukocytosis.   Blood culture show no growth to date.  On cefazolin since 17. She notes improvement in the leg since hospitalization- also getting diuresis- says her legs are swollen because of the arthritis. Left leg is worse than the right one.   She has a dog but no bites or licking of the legs notes. No trauma to the leg. she does not recall any inciting event.         REVIEW OF SYSTEMS  All as below unless noted otherwise    General:  Denies fever and chills. 	  Ophthalmologic: Denies any visual complaints. 	  ENMT: No throat pain.  Respiratory: No cough, sputum. No shortness of breath.   Cardiovascular: No chest pain.   Gastrointestinal: No nausea or vomiting. No abdominal pain or diarrhea.   Genitourinary: No burning urine, no frequency. No flank pain  Musculoskeletal: multiple joints in pain chronically  Neurological: No confusion. 	  Skin: bilateral leg swelling and pain     PAST MEDICAL & SURGICAL HISTORY:  Breast CA  Scoliosis  HTN (hypertension)  H/O shoulder surgery  Cataract  H/O: hysterectomy  H/O mastectomy, right  History of hip replacement  H/O  section    FAMILY HISTORY:  Family history of lung cancer (Mother): mother -   Family history of emphysema (Father): father -   Family history of Hodgkin's disease (Sibling): sisters - living  Family history of breast cancer (Sibling): sister living    SOCIAL HISTORY:  non smoker      ANTIMICROBIALS:    ceFAZolin   IVPB 1000 every 8 hours      OTHER MEDS:    diltiazem    milliGRAM(s) Oral daily  DULoxetine 60 milliGRAM(s) Oral daily  traZODone 50 milliGRAM(s) Oral at bedtime  furosemide    Tablet 40 milliGRAM(s) Oral daily  baclofen 10 milliGRAM(s) Oral daily  pantoprazole    Tablet 40 milliGRAM(s) Oral before breakfast  oxyCODONE    IR 10 milliGRAM(s) Oral every 6 hours PRN  aspirin  chewable 81 milliGRAM(s) Oral daily  enoxaparin Injectable 40 milliGRAM(s) SubCutaneous daily      Allergies  No Known Allergies      Vital Signs Last 24 Hrs  T(C): 36.8 (10 Aug 2017 04:52), Max: 36.8 (10 Aug 2017 04:52)  T(F): 98.3 (10 Aug 2017 04:52), Max: 98.3 (10 Aug 2017 04:52)  HR: 57 (10 Aug 2017 04:52) (57 - 66)  BP: 115/62 (10 Aug 2017 04:52) (109/64 - 116/70)  RR: 18 (10 Aug 2017 04:52) (18 - 18)  SpO2: 96% (10 Aug 2017 04:52) (96% - 96%)        PHYSICAL EXAM:  patient in no acute respiratory distress. obese   Constitutional: Comfortable. Awake and alert  Eyes: PERRL EOMI. No discharge.  ENMT: No sinus tenderness.  No pharyngeal erythema.   Neck: Supple  Respiratory: Good air entry bilaterally, no wheezes, rhonchi, or crackles  Cardiovascular:S1 S2 wnl, No murmurs  Gastrointestinal: Soft, no tenderness , bowel sounds present,   Genitourinary: No suprapubic tenderness. no CVA tenderness   Musculoskeletal: right lower extremity is shorter than the left- 2 +pitting edema left more than right   Vascular: peripheral pulses felt  Neurological: No grossly focal deficits  Skin: left leg is warm, swollen, nontender, erythematous right leg as well but less so  -- No induration.  Lymph Nodes: No palpable Lymphadenopathy   Psychiatric: Affect normal                            8.4    4.08  )-----------( 177      ( 10 Aug 2017 07:39 )             29.0     WBC Count: 4.08 (08-10 @ 07:39)  WBC Count: 6.20 ( @ 07:02)  WBC Count: 8.9 ( @ 22:01)        140  |  97  |  10  ----------------------------<  119<H>  3.3<L>   |  29  |  0.59    Ca    8.8      09 Aug 2017 07:41          MICROBIOLOGY:    Culture - Blood (17 @ 00:51)    Specimen Source: .Blood Blood-Venous    Culture Results:   No growth to date.    Culture - Blood (17 @ 00:51)    Specimen Source: .Blood Blood-Peripheral    Culture Results:   No growth to date.      RADIOLOGY:  none new

## 2017-08-10 NOTE — PROGRESS NOTE ADULT - SUBJECTIVE AND OBJECTIVE BOX
- Patient seen and examined.  - In summary, patient is a 77y year old woman who presented with LE redness and pain (08 Aug 2017 23:37)  - Today, patient is without complaints.         *****MEDICATIONS:    MEDICATIONS  (STANDING):  diltiazem    milliGRAM(s) Oral daily  DULoxetine 60 milliGRAM(s) Oral daily  traZODone 50 milliGRAM(s) Oral at bedtime  furosemide    Tablet 40 milliGRAM(s) Oral daily  baclofen 10 milliGRAM(s) Oral daily  pantoprazole    Tablet 40 milliGRAM(s) Oral before breakfast  aspirin  chewable 81 milliGRAM(s) Oral daily  ceFAZolin   IVPB 1000 milliGRAM(s) IV Intermittent every 8 hours  enoxaparin Injectable 40 milliGRAM(s) SubCutaneous daily    MEDICATIONS  (PRN):  oxyCODONE    IR 10 milliGRAM(s) Oral every 6 hours PRN Moderate Pain (4 - 6)           ***** REVIEW OF SYSTEM:  GEN: no fever, no chills, no pain  RESP: no SOB, no cough, no sputum  CVS: no chest pain, no palpitations, no edema  GI: no abdominal pain, no nausea, no vomiting, no constipation, no diarrhea  : no dysurea, no frequency  NEURO: no headache, no diziness  PSYCH: no depression, not anxious  Derm : no itching, no rash         ***** VITAL SIGNS:  T(F): 98.3 (08-10-17 @ 04:52), Max: 98.3 (08-10-17 @ 04:52)  HR: 57 (08-10-17 @ 04:52) (57 - 66)  BP: 115/62 (08-10-17 @ 04:52) (109/64 - 116/70)  RR: 18 (08-10-17 @ 04:52) (18 - 18)  SpO2: 96% (08-10-17 @ 04:52) (96% - 96%)  Wt(kg): --  ,   I&O's Summary    09 Aug 2017 07:01  -  10 Aug 2017 07:00  --------------------------------------------------------  IN: 1600 mL / OUT: 800 mL / NET: 800 mL             *****PHYSICAL EXAM:  GEN: A&O X 3 , NAD , comfortable  HEENT: NCAT, EOMI, MMM, no icterus  NECK: Supple, No JVD  CVS: S1S2 , regular , No M/R/G appreciated  PULM: CTA B/L,  no W/R/R appreciated  ABD.: soft. non tender, non distended,  bowel sounds present  Extrem: intact pulses , no edema noted  Derm: No rash or ecchymosis noted  PSYCH: normal mood, no depression, not anxious         *****LAB AND IMAGIN.4    4.08  )-----------( 177      ( 10 Aug 2017 07:39 )             29.0               08-    140  |  97  |  10  ----------------------------<  119<H>  3.3<L>   |  29  |  0.59    Ca    8.8      09 Aug 2017 07:41    [All pertinent recent Imaging/Reports reviewed]         *****A S S E S S M E N T   A N D   P L A N :    77F cellulitis  started on ancef  ID eval pending  cont home meds  LE doppler r/o DVT  PT      __________________________  ANTONIO Elliott D.O.

## 2017-08-10 NOTE — CONSULT NOTE ADULT - ATTENDING COMMENTS
Patient has not had recent trauma. No leg pain, fever or leukocytosis. There is bilateral involvement. She has chronic le edema. 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  Impression is venous stasis dermatitis.    Suggest: Discontinue antibiotics. Agree with Venous Doppler to rule out DVT.

## 2017-08-10 NOTE — CONSULT NOTE ADULT - ASSESSMENT
78 y/o female with bilateral knee arthritis, severe scoliosis resulting in LLE short leg, chronic LE edema x 1 year, HTN, remote breast cancer s/p mastectomy and LN resection, provoked PE 1993 p/w worsening bilateral LE redness, swelling and pain x 2 weeks.     1- Lower extremity swelling  - ? cellulitis  - on cefazolin day 3 78 y/o female with bilateral knee arthritis, severe scoliosis resulting in LLE short leg, chronic LE edema x 1 year, HTN, remote breast cancer s/p mastectomy and LN resection, provoked PE 1993 p/w worsening bilateral LE redness, swelling and pain x 2 weeks.     1- Lower extremity swelling  - ? cellulitis- patient notes improvement since hospitalization  - on cefazolin day 3 78 y/o female with bilateral knee arthritis, severe scoliosis resulting in LLE short leg, chronic LE edema x 1 year, HTN, remote breast cancer s/p mastectomy and LN resection, provoked PE 1993 p/w worsening bilateral LE redness, swelling and pain x 2 weeks.     1- Lower extremity swelling left more than right   - ? cellulitis- patient notes improvement since hospitalization  - on cefazolin day 3  - f/u doppler to r.o dvt 78 y/o female with bilateral knee arthritis, severe scoliosis resulting in LLE short leg, chronic LE edema x 1 year, HTN, remote breast cancer s/p mastectomy and LN resection, provoked PE 1993 p/w worsening bilateral LE redness, swelling and pain x 2 weeks.     1- Lower extremity swelling left more than right   - ? cellulitis- patient notes improvement since hospitalization- also getting diuresis- says her legs are swollen because of the arthritis  - on cefazolin day 3  - f/u doppler to r.o dvt   - consider checking echo to r.o heart failure 78 y/o female with bilateral knee arthritis, severe scoliosis resulting in LLE short leg, chronic LE edema x 1 year, HTN, remote breast cancer s/p mastectomy and LN resection, provoked PE 1993 p/w worsening bilateral LE redness, swelling and pain x 2 weeks.     1- Lower extremity swelling left more than right- may have venous insuffiencey   - ? cellulitis- patient notes improvement since hospitalization- also getting diuresis- says her legs are swollen because of the arthritis  - on cefazolin day 3- discontinue antibiotic  - f/u doppler to r.o dvt   - consider checking echo to r.o heart failure

## 2017-08-11 ENCOUNTER — TRANSCRIPTION ENCOUNTER (OUTPATIENT)
Age: 77
End: 2017-08-11

## 2017-08-11 VITALS
DIASTOLIC BLOOD PRESSURE: 78 MMHG | SYSTOLIC BLOOD PRESSURE: 134 MMHG | HEART RATE: 72 BPM | RESPIRATION RATE: 18 BRPM | TEMPERATURE: 98 F | OXYGEN SATURATION: 94 %

## 2017-08-11 LAB
ANION GAP SERPL CALC-SCNC: 13 MMOL/L — SIGNIFICANT CHANGE UP (ref 5–17)
BASOPHILS # BLD AUTO: 0.02 K/UL — SIGNIFICANT CHANGE UP (ref 0–0.2)
BASOPHILS NFR BLD AUTO: 0.4 % — SIGNIFICANT CHANGE UP (ref 0–2)
BUN SERPL-MCNC: 16 MG/DL — SIGNIFICANT CHANGE UP (ref 7–23)
CALCIUM SERPL-MCNC: 9 MG/DL — SIGNIFICANT CHANGE UP (ref 8.4–10.5)
CHLORIDE SERPL-SCNC: 98 MMOL/L — SIGNIFICANT CHANGE UP (ref 96–108)
CO2 SERPL-SCNC: 28 MMOL/L — SIGNIFICANT CHANGE UP (ref 22–31)
CREAT SERPL-MCNC: 0.55 MG/DL — SIGNIFICANT CHANGE UP (ref 0.5–1.3)
EOSINOPHIL # BLD AUTO: 0.13 K/UL — SIGNIFICANT CHANGE UP (ref 0–0.5)
EOSINOPHIL NFR BLD AUTO: 2.4 % — SIGNIFICANT CHANGE UP (ref 0–6)
GLUCOSE SERPL-MCNC: 83 MG/DL — SIGNIFICANT CHANGE UP (ref 70–99)
HCT VFR BLD CALC: 36.8 % — SIGNIFICANT CHANGE UP (ref 34.5–45)
HGB BLD-MCNC: 11.2 G/DL — LOW (ref 11.5–15.5)
IMM GRANULOCYTES NFR BLD AUTO: 0.2 % — SIGNIFICANT CHANGE UP (ref 0–1.5)
LYMPHOCYTES # BLD AUTO: 1.91 K/UL — SIGNIFICANT CHANGE UP (ref 1–3.3)
LYMPHOCYTES # BLD AUTO: 35 % — SIGNIFICANT CHANGE UP (ref 13–44)
MCHC RBC-ENTMCNC: 27.2 PG — SIGNIFICANT CHANGE UP (ref 27–34)
MCHC RBC-ENTMCNC: 30.4 GM/DL — LOW (ref 32–36)
MCV RBC AUTO: 89.3 FL — SIGNIFICANT CHANGE UP (ref 80–100)
MONOCYTES # BLD AUTO: 0.55 K/UL — SIGNIFICANT CHANGE UP (ref 0–0.9)
MONOCYTES NFR BLD AUTO: 10.1 % — SIGNIFICANT CHANGE UP (ref 2–14)
NEUTROPHILS # BLD AUTO: 2.83 K/UL — SIGNIFICANT CHANGE UP (ref 1.8–7.4)
NEUTROPHILS NFR BLD AUTO: 51.9 % — SIGNIFICANT CHANGE UP (ref 43–77)
PLATELET # BLD AUTO: 230 K/UL — SIGNIFICANT CHANGE UP (ref 150–400)
POTASSIUM SERPL-MCNC: 4.3 MMOL/L — SIGNIFICANT CHANGE UP (ref 3.5–5.3)
POTASSIUM SERPL-SCNC: 4.3 MMOL/L — SIGNIFICANT CHANGE UP (ref 3.5–5.3)
RBC # BLD: 4.12 M/UL — SIGNIFICANT CHANGE UP (ref 3.8–5.2)
RBC # FLD: 14.7 % — HIGH (ref 10.3–14.5)
SODIUM SERPL-SCNC: 139 MMOL/L — SIGNIFICANT CHANGE UP (ref 135–145)
WBC # BLD: 5.45 K/UL — SIGNIFICANT CHANGE UP (ref 3.8–10.5)
WBC # FLD AUTO: 5.45 K/UL — SIGNIFICANT CHANGE UP (ref 3.8–10.5)

## 2017-08-11 RX ADMIN — OXYCODONE HYDROCHLORIDE 10 MILLIGRAM(S): 5 TABLET ORAL at 07:20

## 2017-08-11 RX ADMIN — Medication 180 MILLIGRAM(S): at 06:10

## 2017-08-11 RX ADMIN — PANTOPRAZOLE SODIUM 40 MILLIGRAM(S): 20 TABLET, DELAYED RELEASE ORAL at 06:10

## 2017-08-11 RX ADMIN — Medication 40 MILLIEQUIVALENT(S): at 00:06

## 2017-08-11 RX ADMIN — ENOXAPARIN SODIUM 40 MILLIGRAM(S): 100 INJECTION SUBCUTANEOUS at 12:03

## 2017-08-11 RX ADMIN — Medication 40 MILLIGRAM(S): at 06:10

## 2017-08-11 RX ADMIN — Medication 81 MILLIGRAM(S): at 12:03

## 2017-08-11 RX ADMIN — OXYCODONE HYDROCHLORIDE 10 MILLIGRAM(S): 5 TABLET ORAL at 12:01

## 2017-08-11 RX ADMIN — Medication 10 MILLIGRAM(S): at 12:03

## 2017-08-11 RX ADMIN — OXYCODONE HYDROCHLORIDE 10 MILLIGRAM(S): 5 TABLET ORAL at 06:07

## 2017-08-11 RX ADMIN — OXYCODONE HYDROCHLORIDE 10 MILLIGRAM(S): 5 TABLET ORAL at 00:05

## 2017-08-11 RX ADMIN — DULOXETINE HYDROCHLORIDE 60 MILLIGRAM(S): 30 CAPSULE, DELAYED RELEASE ORAL at 12:04

## 2017-08-11 NOTE — PROGRESS NOTE ADULT - SUBJECTIVE AND OBJECTIVE BOX
F/u: not cellulitis- stasis dermatitis     Interval History/ROS:  no fever or chills.  no chest pain. no sob. no cough.   no abdominal pain, diarrhea or burning urine.       Allergies  No Known Allergies        ANTIMICROBIALS: none         Vital Signs Last 24 Hrs  T(C): 36.8 (11 Aug 2017 05:01), Max: 36.8 (11 Aug 2017 05:01)  T(F): 98.2 (11 Aug 2017 05:01), Max: 98.2 (11 Aug 2017 05:01)  HR: 72 (11 Aug 2017 05:01) (66 - 72)  BP: 134/78 (11 Aug 2017 05:01) (112/63 - 134/78)  RR: 18 (11 Aug 2017 05:01) (18 - 18)  SpO2: 94% (11 Aug 2017 05:01) (94% - 95%)      PHYSICAL EXAM:  General:  non-toxic  HEAD/EYES:  PERRL  white sclera  ENT:  normal  supple  Cardiovascular:   no murmur   Respiratory:   clear to ausculation bilaterally  GI:   soft, non-tender, normal bowel sounds  :   no CVA tenderness   Musculoskeletal:  no synovitis  Neurologic:   non-focal exam   Skin:   no rash  Lymph:  no lymphadenopathy  Psychiatric:   appropriate affect, alert & oriented  Lines:  no phlebitis                          11.2   5.45  )-----------( 230      ( 11 Aug 2017 08:02 )             36.8      08-11    139  |  98  |  16  ----------------------------<  83  4.3   |  28  |  0.55    Ca    9.0      11 Aug 2017 08:02      Microbiology:   none new    RADIOLOGY:  < from: [facility] Duplex Lower Ext Vein Scan, Bilat (08.10.17 @ 17:44) >  No evidence of bilateral lower extremity deep venous thrombosis. F/u: not cellulitis- stasis dermatitis     Interval History/ROS: no leg pain or tenderness   no fever or chills.  no chest pain. no sob. no cough.   no abdominal pain, diarrhea or burning urine.       Allergies  No Known Allergies        ANTIMICROBIALS: none         Vital Signs Last 24 Hrs  T(C): 36.8 (11 Aug 2017 05:01), Max: 36.8 (11 Aug 2017 05:01)  T(F): 98.2 (11 Aug 2017 05:01), Max: 98.2 (11 Aug 2017 05:01)  HR: 72 (11 Aug 2017 05:01) (66 - 72)  BP: 134/78 (11 Aug 2017 05:01) (112/63 - 134/78)  RR: 18 (11 Aug 2017 05:01) (18 - 18)  SpO2: 94% (11 Aug 2017 05:01) (94% - 95%)      PHYSICAL EXAM:  General:  non-toxic  HEAD/EYES:  PERRL  white sclera  ENT:  normal  supple  Cardiovascular:   no murmur   Respiratory:   clear to ausculation bilaterally  GI:   soft, non-tender, normal bowel sounds  :   no CVA tenderness   Musculoskeletal:  no synovitis  Neurologic:   non-focal exam   Skin:   bilateral leg swelling, warmth and erythema no tenderness   Lymph:  no lymphadenopathy  Psychiatric:   appropriate affect, alert & oriented  Lines:  no phlebitis                          11.2   5.45  )-----------( 230      ( 11 Aug 2017 08:02 )             36.8      08-11    139  |  98  |  16  ----------------------------<  83  4.3   |  28  |  0.55    Ca    9.0      11 Aug 2017 08:02      Microbiology:   none new    RADIOLOGY:  < from: [facility] Duplex Lower Ext Vein Scan, Bilat (08.10.17 @ 17:44) >  No evidence of bilateral lower extremity deep venous thrombosis.

## 2017-08-11 NOTE — CHART NOTE - NSCHARTNOTEFT_GEN_A_CORE
Requested by Dr Elliott to discharge the patient home today. Medication reconciliation reviewed with Dr Elliott and also with patient.  Liv Hicks NP  [id number]

## 2017-08-11 NOTE — PROGRESS NOTE ADULT - SUBJECTIVE AND OBJECTIVE BOX
- Patient seen and examined.  - In summary, patient is a 77y year old woman who presented with LE redness and pain (08 Aug 2017 23:37)  - Today, patient is without complaints.         *****MEDICATIONS:    MEDICATIONS  (STANDING):  diltiazem    milliGRAM(s) Oral daily  DULoxetine 60 milliGRAM(s) Oral daily  traZODone 50 milliGRAM(s) Oral at bedtime  furosemide    Tablet 40 milliGRAM(s) Oral daily  baclofen 10 milliGRAM(s) Oral daily  pantoprazole    Tablet 40 milliGRAM(s) Oral before breakfast  aspirin  chewable 81 milliGRAM(s) Oral daily  enoxaparin Injectable 40 milliGRAM(s) SubCutaneous daily    MEDICATIONS  (PRN):  oxyCODONE    IR 10 milliGRAM(s) Oral every 6 hours PRN Moderate Pain (4 - 6)             ***** REVIEW OF SYSTEM:  GEN: no fever, no chills, no pain  RESP: no SOB, no cough, no sputum  CVS: no chest pain, no palpitations, no edema  GI: no abdominal pain, no nausea, no vomiting, no constipation, no diarrhea  : no dysurea, no frequency  NEURO: no headache, no diziness  PSYCH: no depression, not anxious  Derm : no itching, no rash         ***** VITAL SIGNS:    T(F): 98.2 (17 @ 05:01), Max: 98.2 (17 @ 05:01)  HR: 72 (17 @ 05:01) (66 - 72)  BP: 134/78 (17 @ 05:01) (112/63 - 134/78)  RR: 18 (17 @ 05:01) (18 - 18)  SpO2: 94% (17 @ 05:01) (94% - 95%)  Wt(kg): --  ,   I&O's Summary    10 Aug 2017 07:01  -  11 Aug 2017 07:00  --------------------------------------------------------  IN: 360 mL / OUT: 0 mL / NET: 360 mL                 *****PHYSICAL EXAM:  GEN: A&O X 3 , NAD , comfortable  HEENT: NCAT, EOMI, MMM, no icterus  NECK: Supple, No JVD  CVS: S1S2 , regular , No M/R/G appreciated  PULM: CTA B/L,  no W/R/R appreciated  ABD.: soft. non tender, non distended,  bowel sounds present  Extrem: intact pulses , no edema noted  Derm: No rash or ecchymosis noted  PSYCH: normal mood, no depression, not anxious         *****LAB AND IMAGIN.4    4.08  )-----------( 177      ( 10 Aug 2017 07:39 )             29.0       [All pertinent recent Imaging/Reports reviewed]         *****A S S E S S M E N T   A N D   P L A N :    77F cellulitis  ID eval noted  abx DC  cont home meds  LE doppler no DVT  PT  DCP    __________________________  ANTONIO Elliott D.O.

## 2017-08-11 NOTE — DISCHARGE NOTE ADULT - PLAN OF CARE
Resolution Improved. Received antibiotic. F/U with your primary doctor in 2 weeks. Low salt diet  Activity as tolerated.  Take all medication as prescribed.  Follow up with your medical doctor for routine blood pressure monitoring at your next visit.  Notify your doctor if you have any of the following symptoms:   Dizziness, Lightheadedness, Blurry vision, Headache, Chest pain, Shortness of breath Improved. Received antibiotic. F/U with your primary doctor in 2 weeks.  Call your Health Care Provider within two days of arriving home to make a follow up appointment within one to  two weeks.  If the affected cellulitic area increases in redness, warmth, pain or swelling call your Health Care Provider.  If you develop fever, chills, and/or malaise, call your Health Care Provider. Keep the extremities elevated   as much as possible.

## 2017-08-11 NOTE — PROGRESS NOTE ADULT - ASSESSMENT
76 y/o female with bilateral knee arthritis, severe scoliosis resulting in LLE short leg, chronic LE edema x 1 year, HTN, remote breast cancer s/p mastectomy and LN resection, provoked PE 1993 p/w worsening bilateral LE redness, swelling and pain x 2 weeks. Dopplers negative.     1- Lower extremity swelling left more than right- stasis dermatitis   - off antibiotics

## 2017-08-11 NOTE — DISCHARGE NOTE ADULT - CARE PROVIDER_API CALL
Nydia Jaramillo (DO), Cardiology Medicine  [address] [place] [id number]  Phone: [phone]  Fax: [phone]

## 2017-08-11 NOTE — DISCHARGE NOTE ADULT - PATIENT PORTAL LINK FT
“You can access the [employer] Patient Portal, offered by [facility], by registering with the following website: [email or web address]”

## 2017-08-11 NOTE — DISCHARGE NOTE ADULT - CARE PLAN
Principal Discharge DX:	Cellulitis  Secondary Diagnosis:	Essential hypertension Principal Discharge DX:	Cellulitis  Goal:	Resolution  Instructions for follow-up, activity and diet:	Improved. Received antibiotic. F/U with your primary doctor in 2 weeks.  Secondary Diagnosis:	Essential hypertension  Instructions for follow-up, activity and diet:	Low salt diet  Activity as tolerated.  Take all medication as prescribed.  Follow up with your medical doctor for routine blood pressure monitoring at your next visit.  Notify your doctor if you have any of the following symptoms:   Dizziness, Lightheadedness, Blurry vision, Headache, Chest pain, Shortness of breath Principal Discharge DX:	Cellulitis  Goal:	Resolution  Instructions for follow-up, activity and diet:	Improved. Received antibiotic. F/U with your primary doctor in 2 weeks.  Call your Health Care Provider within two days of arriving home to make a follow up appointment within one to  two weeks.  If the affected cellulitic area increases in redness, warmth, pain or swelling call your Health Care Provider.  If you develop fever, chills, and/or malaise, call your Health Care Provider. Keep the extremities elevated   as much as possible.  Secondary Diagnosis:	Essential hypertension  Instructions for follow-up, activity and diet:	Low salt diet  Activity as tolerated.  Take all medication as prescribed.  Follow up with your medical doctor for routine blood pressure monitoring at your next visit.  Notify your doctor if you have any of the following symptoms:   Dizziness, Lightheadedness, Blurry vision, Headache, Chest pain, Shortness of breath

## 2017-08-11 NOTE — DISCHARGE NOTE ADULT - HOSPITAL COURSE
By attending Pt adm with cellulitis.  Responded well to IV abx.  Component of chronic venous stasis.  Eval by ID and abx DC.  Will f/u Dr Jaramillo 2 weeks.

## 2017-08-11 NOTE — DISCHARGE NOTE ADULT - MEDICATION SUMMARY - MEDICATIONS TO TAKE
I will START or STAY ON the medications listed below when I get home from the hospital:    oxyCODONE 10 mg oral tablet  -- 1 tab(s) by mouth 5 times a day, As Needed  -- Indication: For Pain    aspirin 81 mg oral delayed release capsule  -- 1 tab(s) by mouth once a day  -- Indication: For Blood thinner    Cartia XT  -- 180 milligram(s) by mouth once a day  -- Indication: For HTN (hypertension)    DULoxetine  -- 60 milligram(s) by mouth once a day  -- Indication: For depression    traZODone  -- 50 milligram(s) by mouth once a day (at bedtime)  -- Indication: For depression    cevimeline  -- 30 milligram(s) by mouth 3 times a day  -- Indication: For drymouth    furosemide  -- 40 milligram(s) by mouth once a day  -- Indication: For HTN (hypertension)    Amitiza  -- 24 microgram(s) by mouth 2 times a day  -- Indication: For Constipation    baclofen  -- 10  by mouth once a day  -- Indication: For muscle relaxant    omeprazole  -- 20 milligram(s) by mouth once a day  -- Indication: For Stomach

## 2017-08-14 LAB
CULTURE RESULTS: SIGNIFICANT CHANGE UP
CULTURE RESULTS: SIGNIFICANT CHANGE UP
SPECIMEN SOURCE: SIGNIFICANT CHANGE UP
SPECIMEN SOURCE: SIGNIFICANT CHANGE UP

## 2017-09-15 NOTE — PATIENT PROFILE ADULT. - ANESTHESIA, PREVIOUS REACTION, PROFILE
Self-Care for Sore Throats    Sore throats happen for many reasons, such as colds, allergies, and infections caused by viruses or bacteria. In any case, your throat becomes red and sore. Your goal for self-care is to reduce your discomfort while giving your throat a chance to heal.  Moisten and soothe your throat  Tips include the following:    Try a sip of water first thing after waking up.    Keep your throat moist by drinking 6 or more glasses of clear liquids every day.    Run a cool-air humidifier in your room overnight.    Avoid cigarette smoke.     Suck on throat lozenges, cough drops, hard candy, ice chips, or frozen fruit-juice bars. Use the sugar-free versions if your diet or medical condition requires them.  Gargle to ease irritation  Gargling every hour or 2 can ease irritation. Try gargling with 1 of these solutions:    1/4 teaspoon of salt in 1/2 cup of warm water    An over-the-counter anesthetic gargle  Use medicine for more relief  Over-the-counter medicine can reduce sore throat symptoms. Ask your pharmacist if you have questions about which medicine to use:    Ease pain with anesthetic sprays. Aspirin or an aspirin substitute also helps. Remember, never give aspirin to anyone 18 or younger, or if you are already taking blood thinners.     For sore throats caused by allergies, try antihistamines to block the allergic reaction.    Remember: unless a sore throat is caused by a bacterial infection, antibiotics won t help you.  Prevent future sore throats  Prevention tips include the following:    Stop smoking or reduce contact with secondhand smoke. Smoke irritates the tender throat lining.    Limit contact with pets and with allergy-causing substances, such as pollen and mold.    When you re around someone with a sore throat or cold, wash your hands often to keep viruses or bacteria from spreading.    Don t strain your vocal cords.  Call your healthcare provider  Contact your healthcare provider if  you have:    A temperature over 101 F (38.3 C)    White spots on the throat    Great difficulty swallowing    Trouble breathing    A skin rash    Recent exposure to someone else with strep bacteria    Severe hoarseness and swollen glands in the neck or jaw   Date Last Reviewed: 8/1/2016 2000-2017 [employer]. [address] [place] [address]. All rights reserved. This information is not intended as a substitute for professional medical care. Always follow your healthcare professional's instructions.         none

## 2017-10-19 PROCEDURE — 87040 BLOOD CULTURE FOR BACTERIA: CPT

## 2017-10-19 PROCEDURE — 93005 ELECTROCARDIOGRAM TRACING: CPT

## 2017-10-19 PROCEDURE — 80048 BASIC METABOLIC PNL TOTAL CA: CPT

## 2017-10-19 PROCEDURE — 93970 EXTREMITY STUDY: CPT

## 2017-10-19 PROCEDURE — 85027 COMPLETE CBC AUTOMATED: CPT

## 2017-10-19 PROCEDURE — 99285 EMERGENCY DEPT VISIT HI MDM: CPT | Mod: 25

## 2017-10-19 PROCEDURE — 93926 LOWER EXTREMITY STUDY: CPT

## 2018-12-11 PROBLEM — C50.919 MALIGNANT NEOPLASM OF UNSPECIFIED SITE OF UNSPECIFIED FEMALE BREAST: Chronic | Status: ACTIVE | Noted: 2017-08-08

## 2018-12-11 PROBLEM — I10 ESSENTIAL (PRIMARY) HYPERTENSION: Chronic | Status: ACTIVE | Noted: 2017-08-08

## 2018-12-11 PROBLEM — M41.9 SCOLIOSIS, UNSPECIFIED: Chronic | Status: ACTIVE | Noted: 2017-08-08

## 2018-12-14 ENCOUNTER — APPOINTMENT (OUTPATIENT)
Dept: WOUND CARE | Facility: CLINIC | Age: 78
End: 2018-12-14
Payer: MEDICARE

## 2018-12-14 DIAGNOSIS — Z86.79 PERSONAL HISTORY OF OTHER DISEASES OF THE CIRCULATORY SYSTEM: ICD-10-CM

## 2018-12-14 DIAGNOSIS — Z87.39 PERSONAL HISTORY OF OTHER DISEASES OF THE MUSCULOSKELETAL SYSTEM AND CONNECTIVE TISSUE: ICD-10-CM

## 2018-12-14 DIAGNOSIS — Z85.3 PERSONAL HISTORY OF MALIGNANT NEOPLASM OF BREAST: ICD-10-CM

## 2018-12-14 DIAGNOSIS — L89.152 PRESSURE ULCER OF SACRAL REGION, STAGE 2: ICD-10-CM

## 2018-12-14 DIAGNOSIS — Z87.891 PERSONAL HISTORY OF NICOTINE DEPENDENCE: ICD-10-CM

## 2018-12-14 PROCEDURE — 99203 OFFICE O/P NEW LOW 30 MIN: CPT

## 2018-12-20 ENCOUNTER — TRANSCRIPTION ENCOUNTER (OUTPATIENT)
Age: 78
End: 2018-12-20

## 2018-12-21 ENCOUNTER — INPATIENT (INPATIENT)
Facility: HOSPITAL | Age: 78
LOS: 5 days | Discharge: HOME | DRG: 481 | End: 2018-12-27
Attending: ORTHOPAEDIC SURGERY | Admitting: ORTHOPAEDIC SURGERY
Payer: MEDICARE

## 2018-12-21 VITALS
RESPIRATION RATE: 19 BRPM | OXYGEN SATURATION: 96 % | DIASTOLIC BLOOD PRESSURE: 80 MMHG | SYSTOLIC BLOOD PRESSURE: 150 MMHG | HEART RATE: 86 BPM

## 2018-12-21 DIAGNOSIS — Z98.891 HISTORY OF UTERINE SCAR FROM PREVIOUS SURGERY: Chronic | ICD-10-CM

## 2018-12-21 DIAGNOSIS — Z96.649 PRESENCE OF UNSPECIFIED ARTIFICIAL HIP JOINT: Chronic | ICD-10-CM

## 2018-12-21 DIAGNOSIS — S72.90XA UNSPECIFIED FRACTURE OF UNSPECIFIED FEMUR, INITIAL ENCOUNTER FOR CLOSED FRACTURE: ICD-10-CM

## 2018-12-21 DIAGNOSIS — C50.919 MALIGNANT NEOPLASM OF UNSPECIFIED SITE OF UNSPECIFIED FEMALE BREAST: ICD-10-CM

## 2018-12-21 DIAGNOSIS — Z98.890 OTHER SPECIFIED POSTPROCEDURAL STATES: Chronic | ICD-10-CM

## 2018-12-21 DIAGNOSIS — Z90.710 ACQUIRED ABSENCE OF BOTH CERVIX AND UTERUS: Chronic | ICD-10-CM

## 2018-12-21 DIAGNOSIS — I10 ESSENTIAL (PRIMARY) HYPERTENSION: ICD-10-CM

## 2018-12-21 DIAGNOSIS — H26.9 UNSPECIFIED CATARACT: Chronic | ICD-10-CM

## 2018-12-21 DIAGNOSIS — L89.309 PRESSURE ULCER OF UNSPECIFIED BUTTOCK, UNSPECIFIED STAGE: ICD-10-CM

## 2018-12-21 DIAGNOSIS — M41.9 SCOLIOSIS, UNSPECIFIED: ICD-10-CM

## 2018-12-21 DIAGNOSIS — Z90.11 ACQUIRED ABSENCE OF RIGHT BREAST AND NIPPLE: Chronic | ICD-10-CM

## 2018-12-21 LAB
ALBUMIN SERPL ELPH-MCNC: 3.6 G/DL — SIGNIFICANT CHANGE UP (ref 3.3–5)
ALP SERPL-CCNC: 60 U/L — SIGNIFICANT CHANGE UP (ref 40–120)
ALT FLD-CCNC: 8 U/L — LOW (ref 10–45)
ANION GAP SERPL CALC-SCNC: 13 MMOL/L — SIGNIFICANT CHANGE UP (ref 5–17)
APPEARANCE UR: ABNORMAL
APTT BLD: 26.2 SEC — LOW (ref 27.5–36.3)
AST SERPL-CCNC: 15 U/L — SIGNIFICANT CHANGE UP (ref 10–40)
BASOPHILS # BLD AUTO: 0 K/UL — SIGNIFICANT CHANGE UP (ref 0–0.2)
BASOPHILS NFR BLD AUTO: 0.3 % — SIGNIFICANT CHANGE UP (ref 0–2)
BILIRUB SERPL-MCNC: 0.3 MG/DL — SIGNIFICANT CHANGE UP (ref 0.2–1.2)
BILIRUB UR-MCNC: NEGATIVE — SIGNIFICANT CHANGE UP
BLD GP AB SCN SERPL QL: NEGATIVE — SIGNIFICANT CHANGE UP
BUN SERPL-MCNC: 16 MG/DL — SIGNIFICANT CHANGE UP (ref 7–23)
CALCIUM SERPL-MCNC: 9.1 MG/DL — SIGNIFICANT CHANGE UP (ref 8.4–10.5)
CHLORIDE SERPL-SCNC: 99 MMOL/L — SIGNIFICANT CHANGE UP (ref 96–108)
CO2 SERPL-SCNC: 28 MMOL/L — SIGNIFICANT CHANGE UP (ref 22–31)
COLOR SPEC: YELLOW — SIGNIFICANT CHANGE UP
CREAT SERPL-MCNC: 0.66 MG/DL — SIGNIFICANT CHANGE UP (ref 0.5–1.3)
DIFF PNL FLD: ABNORMAL
EOSINOPHIL # BLD AUTO: 0.3 K/UL — SIGNIFICANT CHANGE UP (ref 0–0.5)
EOSINOPHIL NFR BLD AUTO: 2.4 % — SIGNIFICANT CHANGE UP (ref 0–6)
GLUCOSE SERPL-MCNC: 117 MG/DL — HIGH (ref 70–99)
GLUCOSE UR QL: NEGATIVE — SIGNIFICANT CHANGE UP
HCT VFR BLD CALC: 37.4 % — SIGNIFICANT CHANGE UP (ref 34.5–45)
HGB BLD-MCNC: 12.5 G/DL — SIGNIFICANT CHANGE UP (ref 11.5–15.5)
INR BLD: 1.19 RATIO — HIGH (ref 0.88–1.16)
KETONES UR-MCNC: NEGATIVE — SIGNIFICANT CHANGE UP
LEUKOCYTE ESTERASE UR-ACNC: NEGATIVE — SIGNIFICANT CHANGE UP
LYMPHOCYTES # BLD AUTO: 1.7 K/UL — SIGNIFICANT CHANGE UP (ref 1–3.3)
LYMPHOCYTES # BLD AUTO: 12.1 % — LOW (ref 13–44)
MCHC RBC-ENTMCNC: 29.4 PG — SIGNIFICANT CHANGE UP (ref 27–34)
MCHC RBC-ENTMCNC: 33.6 GM/DL — SIGNIFICANT CHANGE UP (ref 32–36)
MCV RBC AUTO: 87.6 FL — SIGNIFICANT CHANGE UP (ref 80–100)
MONOCYTES # BLD AUTO: 0.8 K/UL — SIGNIFICANT CHANGE UP (ref 0–0.9)
MONOCYTES NFR BLD AUTO: 5.5 % — SIGNIFICANT CHANGE UP (ref 2–14)
NEUTROPHILS # BLD AUTO: 11 K/UL — HIGH (ref 1.8–7.4)
NEUTROPHILS NFR BLD AUTO: 79.8 % — HIGH (ref 43–77)
NITRITE UR-MCNC: NEGATIVE — SIGNIFICANT CHANGE UP
PH UR: 6.5 — SIGNIFICANT CHANGE UP (ref 5–8)
PLATELET # BLD AUTO: 229 K/UL — SIGNIFICANT CHANGE UP (ref 150–400)
POTASSIUM SERPL-MCNC: 3.8 MMOL/L — SIGNIFICANT CHANGE UP (ref 3.5–5.3)
POTASSIUM SERPL-SCNC: 3.8 MMOL/L — SIGNIFICANT CHANGE UP (ref 3.5–5.3)
PROT SERPL-MCNC: 6.6 G/DL — SIGNIFICANT CHANGE UP (ref 6–8.3)
PROT UR-MCNC: SIGNIFICANT CHANGE UP
PROTHROM AB SERPL-ACNC: 13.6 SEC — HIGH (ref 10–12.9)
RBC # BLD: 4.27 M/UL — SIGNIFICANT CHANGE UP (ref 3.8–5.2)
RBC # FLD: 12.7 % — SIGNIFICANT CHANGE UP (ref 10.3–14.5)
RH IG SCN BLD-IMP: POSITIVE — SIGNIFICANT CHANGE UP
RH IG SCN BLD-IMP: POSITIVE — SIGNIFICANT CHANGE UP
SODIUM SERPL-SCNC: 140 MMOL/L — SIGNIFICANT CHANGE UP (ref 135–145)
SP GR SPEC: 1.02 — SIGNIFICANT CHANGE UP (ref 1.01–1.02)
UROBILINOGEN FLD QL: ABNORMAL
WBC # BLD: 13.8 K/UL — HIGH (ref 3.8–10.5)
WBC # FLD AUTO: 13.8 K/UL — HIGH (ref 3.8–10.5)

## 2018-12-21 PROCEDURE — 76377 3D RENDER W/INTRP POSTPROCES: CPT | Mod: 26

## 2018-12-21 PROCEDURE — 71045 X-RAY EXAM CHEST 1 VIEW: CPT | Mod: 26

## 2018-12-21 PROCEDURE — 73502 X-RAY EXAM HIP UNI 2-3 VIEWS: CPT | Mod: 26,RT

## 2018-12-21 PROCEDURE — 93010 ELECTROCARDIOGRAM REPORT: CPT

## 2018-12-21 PROCEDURE — 73700 CT LOWER EXTREMITY W/O DYE: CPT | Mod: 26,RT

## 2018-12-21 PROCEDURE — 73560 X-RAY EXAM OF KNEE 1 OR 2: CPT | Mod: 26,RT

## 2018-12-21 PROCEDURE — 73552 X-RAY EXAM OF FEMUR 2/>: CPT | Mod: 26,RT

## 2018-12-21 PROCEDURE — 99285 EMERGENCY DEPT VISIT HI MDM: CPT | Mod: GC,25

## 2018-12-21 RX ORDER — ASCORBIC ACID 60 MG
500 TABLET,CHEWABLE ORAL
Qty: 0 | Refills: 0 | Status: DISCONTINUED | OUTPATIENT
Start: 2018-12-22 | End: 2018-12-27

## 2018-12-21 RX ORDER — OXYCODONE HYDROCHLORIDE 5 MG/1
5 TABLET ORAL EVERY 4 HOURS
Qty: 0 | Refills: 0 | Status: DISCONTINUED | OUTPATIENT
Start: 2018-12-21 | End: 2018-12-21

## 2018-12-21 RX ORDER — SODIUM CHLORIDE 9 MG/ML
1000 INJECTION, SOLUTION INTRAVENOUS
Qty: 0 | Refills: 0 | Status: DISCONTINUED | OUTPATIENT
Start: 2018-12-22 | End: 2018-12-27

## 2018-12-21 RX ORDER — ONDANSETRON 8 MG/1
4 TABLET, FILM COATED ORAL EVERY 6 HOURS
Qty: 0 | Refills: 0 | Status: DISCONTINUED | OUTPATIENT
Start: 2018-12-22 | End: 2018-12-27

## 2018-12-21 RX ORDER — OXYCODONE HYDROCHLORIDE 5 MG/1
10 TABLET ORAL EVERY 4 HOURS
Qty: 0 | Refills: 0 | Status: DISCONTINUED | OUTPATIENT
Start: 2018-12-21 | End: 2018-12-21

## 2018-12-21 RX ORDER — DILTIAZEM HCL 120 MG
180 CAPSULE, EXT RELEASE 24 HR ORAL DAILY
Qty: 0 | Refills: 0 | Status: DISCONTINUED | OUTPATIENT
Start: 2018-12-22 | End: 2018-12-27

## 2018-12-21 RX ORDER — DILTIAZEM HCL 120 MG
180 CAPSULE, EXT RELEASE 24 HR ORAL DAILY
Qty: 0 | Refills: 0 | Status: DISCONTINUED | OUTPATIENT
Start: 2018-12-21 | End: 2018-12-21

## 2018-12-21 RX ORDER — DOCUSATE SODIUM 100 MG
100 CAPSULE ORAL THREE TIMES A DAY
Qty: 0 | Refills: 0 | Status: DISCONTINUED | OUTPATIENT
Start: 2018-12-22 | End: 2018-12-27

## 2018-12-21 RX ORDER — CEFAZOLIN SODIUM 1 G
2000 VIAL (EA) INJECTION EVERY 8 HOURS
Qty: 0 | Refills: 0 | Status: COMPLETED | OUTPATIENT
Start: 2018-12-22 | End: 2018-12-22

## 2018-12-21 RX ORDER — TRAZODONE HCL 50 MG
50 TABLET ORAL AT BEDTIME
Qty: 0 | Refills: 0 | Status: DISCONTINUED | OUTPATIENT
Start: 2018-12-21 | End: 2018-12-21

## 2018-12-21 RX ORDER — DOCUSATE SODIUM 100 MG
100 CAPSULE ORAL THREE TIMES A DAY
Qty: 0 | Refills: 0 | Status: DISCONTINUED | OUTPATIENT
Start: 2018-12-21 | End: 2018-12-21

## 2018-12-21 RX ORDER — ASCORBIC ACID 60 MG
500 TABLET,CHEWABLE ORAL
Qty: 0 | Refills: 0 | Status: DISCONTINUED | OUTPATIENT
Start: 2018-12-21 | End: 2018-12-21

## 2018-12-21 RX ORDER — PANTOPRAZOLE SODIUM 20 MG/1
40 TABLET, DELAYED RELEASE ORAL
Qty: 0 | Refills: 0 | Status: DISCONTINUED | OUTPATIENT
Start: 2018-12-21 | End: 2018-12-21

## 2018-12-21 RX ORDER — ACETAMINOPHEN 500 MG
650 TABLET ORAL EVERY 6 HOURS
Qty: 0 | Refills: 0 | Status: DISCONTINUED | OUTPATIENT
Start: 2018-12-22 | End: 2018-12-27

## 2018-12-21 RX ORDER — SODIUM CHLORIDE 9 MG/ML
1000 INJECTION INTRAMUSCULAR; INTRAVENOUS; SUBCUTANEOUS
Qty: 0 | Refills: 0 | Status: DISCONTINUED | OUTPATIENT
Start: 2018-12-21 | End: 2018-12-21

## 2018-12-21 RX ORDER — HYDROMORPHONE HYDROCHLORIDE 2 MG/ML
1 INJECTION INTRAMUSCULAR; INTRAVENOUS; SUBCUTANEOUS EVERY 4 HOURS
Qty: 0 | Refills: 0 | Status: DISCONTINUED | OUTPATIENT
Start: 2018-12-21 | End: 2018-12-21

## 2018-12-21 RX ORDER — FERROUS SULFATE 325(65) MG
325 TABLET ORAL
Qty: 0 | Refills: 0 | Status: DISCONTINUED | OUTPATIENT
Start: 2018-12-21 | End: 2018-12-21

## 2018-12-21 RX ORDER — BACLOFEN 100 %
10 POWDER (GRAM) MISCELLANEOUS DAILY
Qty: 0 | Refills: 0 | Status: DISCONTINUED | OUTPATIENT
Start: 2018-12-22 | End: 2018-12-27

## 2018-12-21 RX ORDER — FUROSEMIDE 40 MG
40 TABLET ORAL DAILY
Qty: 0 | Refills: 0 | Status: DISCONTINUED | OUTPATIENT
Start: 2018-12-21 | End: 2018-12-21

## 2018-12-21 RX ORDER — MAGNESIUM HYDROXIDE 400 MG/1
30 TABLET, CHEWABLE ORAL DAILY
Qty: 0 | Refills: 0 | Status: DISCONTINUED | OUTPATIENT
Start: 2018-12-22 | End: 2018-12-27

## 2018-12-21 RX ORDER — DIPHENHYDRAMINE HCL 50 MG
25 CAPSULE ORAL AT BEDTIME
Qty: 0 | Refills: 0 | Status: DISCONTINUED | OUTPATIENT
Start: 2018-12-21 | End: 2018-12-21

## 2018-12-21 RX ORDER — SODIUM CHLORIDE 9 MG/ML
3 INJECTION INTRAMUSCULAR; INTRAVENOUS; SUBCUTANEOUS ONCE
Qty: 0 | Refills: 0 | Status: COMPLETED | OUTPATIENT
Start: 2018-12-21 | End: 2018-12-21

## 2018-12-21 RX ORDER — MORPHINE SULFATE 50 MG/1
8 CAPSULE, EXTENDED RELEASE ORAL ONCE
Qty: 0 | Refills: 0 | Status: DISCONTINUED | OUTPATIENT
Start: 2018-12-21 | End: 2018-12-21

## 2018-12-21 RX ORDER — DULOXETINE HYDROCHLORIDE 30 MG/1
60 CAPSULE, DELAYED RELEASE ORAL DAILY
Qty: 0 | Refills: 0 | Status: DISCONTINUED | OUTPATIENT
Start: 2018-12-21 | End: 2018-12-21

## 2018-12-21 RX ORDER — FERROUS SULFATE 325(65) MG
325 TABLET ORAL
Qty: 0 | Refills: 0 | Status: DISCONTINUED | OUTPATIENT
Start: 2018-12-22 | End: 2018-12-27

## 2018-12-21 RX ORDER — FUROSEMIDE 40 MG
40 TABLET ORAL DAILY
Qty: 0 | Refills: 0 | Status: DISCONTINUED | OUTPATIENT
Start: 2018-12-22 | End: 2018-12-27

## 2018-12-21 RX ORDER — ENOXAPARIN SODIUM 100 MG/ML
40 INJECTION SUBCUTANEOUS ONCE
Qty: 0 | Refills: 0 | Status: COMPLETED | OUTPATIENT
Start: 2018-12-21 | End: 2018-12-21

## 2018-12-21 RX ORDER — FOLIC ACID 0.8 MG
1 TABLET ORAL DAILY
Qty: 0 | Refills: 0 | Status: DISCONTINUED | OUTPATIENT
Start: 2018-12-22 | End: 2018-12-27

## 2018-12-21 RX ORDER — DULOXETINE HYDROCHLORIDE 30 MG/1
60 CAPSULE, DELAYED RELEASE ORAL DAILY
Qty: 0 | Refills: 0 | Status: DISCONTINUED | OUTPATIENT
Start: 2018-12-22 | End: 2018-12-27

## 2018-12-21 RX ORDER — BACLOFEN 100 %
10 POWDER (GRAM) MISCELLANEOUS DAILY
Qty: 0 | Refills: 0 | Status: DISCONTINUED | OUTPATIENT
Start: 2018-12-21 | End: 2018-12-21

## 2018-12-21 RX ORDER — ASPIRIN/CALCIUM CARB/MAGNESIUM 324 MG
81 TABLET ORAL DAILY
Qty: 0 | Refills: 0 | Status: DISCONTINUED | OUTPATIENT
Start: 2018-12-21 | End: 2018-12-21

## 2018-12-21 RX ORDER — PANTOPRAZOLE SODIUM 20 MG/1
40 TABLET, DELAYED RELEASE ORAL
Qty: 0 | Refills: 0 | Status: DISCONTINUED | OUTPATIENT
Start: 2018-12-22 | End: 2018-12-27

## 2018-12-21 RX ORDER — MORPHINE SULFATE 50 MG/1
4 CAPSULE, EXTENDED RELEASE ORAL ONCE
Qty: 0 | Refills: 0 | Status: DISCONTINUED | OUTPATIENT
Start: 2018-12-21 | End: 2018-12-21

## 2018-12-21 RX ADMIN — MORPHINE SULFATE 4 MILLIGRAM(S): 50 CAPSULE, EXTENDED RELEASE ORAL at 08:06

## 2018-12-21 RX ADMIN — SODIUM CHLORIDE 125 MILLILITER(S): 9 INJECTION INTRAMUSCULAR; INTRAVENOUS; SUBCUTANEOUS at 09:17

## 2018-12-21 RX ADMIN — SODIUM CHLORIDE 3 MILLILITER(S): 9 INJECTION INTRAMUSCULAR; INTRAVENOUS; SUBCUTANEOUS at 08:12

## 2018-12-21 RX ADMIN — DULOXETINE HYDROCHLORIDE 60 MILLIGRAM(S): 30 CAPSULE, DELAYED RELEASE ORAL at 17:22

## 2018-12-21 RX ADMIN — MORPHINE SULFATE 4 MILLIGRAM(S): 50 CAPSULE, EXTENDED RELEASE ORAL at 08:16

## 2018-12-21 NOTE — ED ADULT NURSE NOTE - NSIMPLEMENTINTERV_GEN_ALL_ED
Implemented All Fall Risk Interventions:  [place] to call system. Call bell, personal items and telephone within reach. Instruct patient to call for assistance. Room bathroom lighting operational. Non-slip footwear when patient is off stretcher. Physically safe environment: no spills, clutter or unnecessary equipment. Stretcher in lowest position, wheels locked, appropriate side rails in place. Provide visual cue, wrist band, yellow gown, etc. Monitor gait and stability. Monitor for mental status changes and reorient to person, place, and time. Review medications for side effects contributing to fall risk. Reinforce activity limits and safety measures with patient and family.

## 2018-12-21 NOTE — CONSULT NOTE ADULT - ASSESSMENT
78yFemale c/o R knee pain s/p mechanical fall in the bathroom. Patient denies head hit or LOC. Patient denies numbness or tingling in the RLE. Patient denies any other injuries. States she is minimally ambulatory around the house with a walker. When leaving the house, she uses a wheelchair. Patient also states she has two ulcers on her buttocks 2/2 remaining in seated positions for extended periods of time.

## 2018-12-21 NOTE — ED PROVIDER NOTE - PHYSICAL EXAMINATION
General: well appearing, interactive, well nourished  HEENT: EOMI, PERRLA, normal mucosa, normal oropharynx, no lesions on the lips or on oral mucosa, normal external ear  head: No septal hematoma, TM x2 clear w/o e/o hemotypanum or CSF rhinorrhea, no armstrong sign, raccoon eyes or csf rhinorrhea, no e/o entrapment, no diplopia on EOM, PERRL, EOMI. No ML C-spine TTP   Neck: supple, no lymphadenopathy, full range of motion, no nuchal rigidity  CV: RRR, normal S1 and S2 with no murmur, capillary refill less than two seconds  Resp: lungs CTA b/l, good aeration bilaterally, symmetric chest wall   Abd: non-distended, soft, non-tender, normoactive bowel sounds  : no CVA tenderness  msk: full rom of hip b/l, full active/passive rom of l knee, r knee swelling compared to left without skin changes, TTP of r knee, decreased active/passive rom of r knee  Neuro: cranial nerves intact, muscle strength 5/5 in all extremities, sensation intact in LE b/l to light touch  Skin: no rashes, skin intact

## 2018-12-21 NOTE — ED PROVIDER NOTE - PROGRESS NOTE DETAILS
ortho paged, informed of distal femur fracture. will obtain pre-surgical labs.   - Arsen Regalado D.O. PGY1

## 2018-12-21 NOTE — CONSULT NOTE ADULT - SUBJECTIVE AND OBJECTIVE BOX
Patient is a 78y old  Female who presents with a chief complaint of Right distal femur fracture (21 Dec 2018 11:31)      HPI:  78yFemale c/o R knee pain s/p mechanical fall in the bathroom. Patient denies head hit or LOC. Patient denies numbness or tingling in the RLE. Patient denies any other injuries. States she is minimally ambulatory around the house with a walker. When leaving the house, she uses a wheelchair. Patient also states she has two ulcers on her buttocks 2/2 remaining in seated positions for extended periods of time.    MEDICATIONS  (STANDING):  ascorbic acid 500 milliGRAM(s) Oral two times a day  aspirin  chewable 81 milliGRAM(s) Oral daily  baclofen 10 milliGRAM(s) Oral daily  diltiazem    milliGRAM(s) Oral daily  docusate sodium 100 milliGRAM(s) Oral three times a day  DULoxetine 60 milliGRAM(s) Oral daily  ferrous    sulfate 325 milliGRAM(s) Oral three times a day with meals  furosemide    Tablet 40 milliGRAM(s) Oral daily  multivitamin 1 Tablet(s) Oral daily  pantoprazole    Tablet 40 milliGRAM(s) Oral before breakfast  sodium chloride 0.9%. 1000 milliLiter(s) (125 mL/Hr) IV Continuous <Continuous>  traZODone 50 milliGRAM(s) Oral at bedtime    MEDICATIONS  (PRN):  diphenhydrAMINE 25 milliGRAM(s) Oral at bedtime PRN Insomnia  HYDROmorphone  Injectable 1 milliGRAM(s) IV Push every 4 hours PRN Severe Pain (7 - 10)  oxyCODONE    IR 10 milliGRAM(s) Oral every 4 hours PRN Moderate Pain (4 - 6)  oxyCODONE    IR 5 milliGRAM(s) Oral every 4 hours PRN Mild Pain (1 - 3)      Allergies    No Known Allergies    Intolerances      PAST MEDICAL HISTORY:  Breast CA  Scoliosis  HTN (hypertension)  decubitus ulcers on buttocks    PAST SURGICAL HISTORY:  H/O shoulder surgery  Cataract  H/O: hysterectomy  H/O mastectomy, right  History of hip replacement  H/O  section      Diet:  (   ) Regular   ( x  ) Low Sodium   (   ) Low Cholesterol   (   ) Diabetic   (   ) Other    FAMILY HISTORY:  Family history of lung cancer: mother -   Family history of emphysema: father -   Family history of Hodgkin's disease (Sibling): sisters - living  Family history of breast cancer (Sibling): sister living      SOCIAL HISTORY:    Substance Use: ( x ) never used  (  ) other:  Tobacco Usage:  (   ) never smoked   (   ) former smoker   (   ) current smoker  (   ) pack years  (   ) last cigarette date  Alcohol Usage: Nne  Advanced Directives: (   ) None    (   ) DNR    (   ) DNI    (   ) Health Care Proxy:     REVIEW OF SYSTEM:  CONSTITUTIONAL: No fever, No change in weight, No fatigue  HEAD: No headache, No dizziness, No recent trauma  EYES: No eye pain, No visual disturbances, No discharge  ENT:  No difficulty hearing, No tinnitus, No vertigo, No sinus pain, No throat pain  NECK: No pain, No stiffness  BREASTS: No pain, No masses, No nipple discharge  RESPIRATORY: No cough, No wheezing, No chills, No hemoptysis, No shortness of breath at rest or exertional shortness of breath  CARDIOVASCULAR: No chest pain, No palpitations, No dizziness, No CHF, No arrhythmia, No cardiomegaly, No leg swelling  GASTROINTESTINAL: No abdominal, No epigastric pain. No nausea, No vomiting, No hematemesis, No diarrhea, No constipation. No melena, No hematochezia. No GERD  GENITOURINARY: No dysuria, No frequency, No hematuria, No incontinence, No nocturia, No hesitancy,  SKIN: No itching, No burning, No rashes, No lesions   LYMPH NODES: No history of enlarged glands  ENDOCRINE: No heat or cold intolerance, No hair loss. No osteoporosis, No thyroid disease  MUSCULOSKELETAL: No joint pain or swelling,     (+) Pain right distal femur  PSYCHIATRIC: No depression, No anxiety, No mood swings, No difficulty sleeping  HEME/LYMPH: No easy bruising, No anticoagulants, No bleeding disorder, No bleeding gums  ALLERGY AND IMMUNOLOGIC: No hives, No eczema  NEUROLOGICAL: No memory loss, No loss of strength, No numbness, No tremors    VITALS:  Vital Signs Last 24 Hrs  T(C): 36.9 (21 Dec 2018 19:53), Max: 36.9 (21 Dec 2018 12:28)  T(F): 98.4 (21 Dec 2018 17:22), Max: 98.5 (21 Dec 2018 12:28)  HR: 83 (21 Dec 2018 19:53) (81 - 86)  BP: 126/76 (21 Dec 2018 19:53) (101/67 - 150/80)  BP(mean): --  RR: 18 (21 Dec 2018 19:53) (18 - 19)  SpO2: 95% (21 Dec 2018 19:53) (94% - 96%)  I&O's Summary    21 Dec 2018 07:01  -  21 Dec 2018 20:07  --------------------------------------------------------  IN: 1000 mL / OUT: 500 mL / NET: 500 mL        PHYSICAL EXAM:  GENERAL: NAD, well nourished and conversant  HEAD:  Atraumatic  EYES: EOM, PERRLA, conjunctiva pink and sclera white  ENT: No tonsillar erythema, exudates, or enlargement, moist mucous membranes, good dentition, no lesions  NECK: Supple, No JVD, normal thyroid, carotids with normal upstrokes and no bruits  CHEST/LUNG: Clear to auscultation bilaterally, No rales, rhonchi, wheezing, or rubs  HEART: Regular rate and rhythm, No murmurs, rubs, or gallops  ABDOMEN: Soft, nondistended, no masses, guarding, tenderness or rebound, bowel sounds present  EXTREMITIES:  2+ Peripheral Pulses, No clubbing, cyanosis, or edema.    (+) right distal femur fracture  LYMPH: No lymphadenopathy noted  SKIN: No rashes or lesions decubitus ulcers on buttocks  NERVOUS SYSTEM:  Alert & Oriented X3, normal cognitive function. Motor Strength 5/5 right upper and right lower.  5/5 left upper and left lower extremities, DTRs 2+ intact and symmetric    LABS:    · EKG Date/Time: 21-Dec-2018 09:46	  · Rate: 86	  · Interpretation: normal sinus rhythm, Normal axis, Normal MA interval and QRS complex. There are no acute ischemic ST or T-wave changes.	  · MA: 146	  · QRS: 88	      CBC Full  -  ( 21 Dec 2018 08:42 )  WBC Count : 13.8 K/uL  Hemoglobin : 12.5 g/dL  Hematocrit : 37.4 %  Platelet Count - Automated : 229 K/uL  Mean Cell Volume : 87.6 fl  Mean Cell Hemoglobin : 29.4 pg  Mean Cell Hemoglobin Concentration : 33.6 gm/dL  Auto Neutrophil # : 11.0 K/uL  Auto Lymphocyte # : 1.7 K/uL  Auto Monocyte # : 0.8 K/uL  Auto Eosinophil # : 0.3 K/uL  Auto Basophil # : 0.0 K/uL  Auto Neutrophil % : 79.8 %  Auto Lymphocyte % : 12.1 %  Auto Monocyte % : 5.5 %  Auto Eosinophil % : 2.4 %  Auto Basophil % : 0.3 %        140  |  99  |  16  ----------------------------<  117<H>  3.8   |  28  |  0.66    Ca    9.1      21 Dec 2018 08:42    TPro  6.6  /  Alb  3.6  /  TBili  0.3  /  DBili  x   /  AST  15  /  ALT  8<L>  /  AlkPhos  60      LIVER FUNCTIONS - ( 21 Dec 2018 08:42 )  Alb: 3.6 g/dL / Pro: 6.6 g/dL / ALK PHOS: 60 U/L / ALT: 8 U/L / AST: 15 U/L / GGT: x           PT/INR - ( 21 Dec 2018 08:42 )   PT: 13.6 sec;   INR: 1.19 ratio         PTT - ( 21 Dec 2018 08:42 )  PTT:26.2 sec  Urinalysis Basic - ( 21 Dec 2018 10:15 )    Color: Yellow / Appearance: Slightly Turbid / S.022 / pH: x  Gluc: x / Ketone: Negative  / Bili: Negative / Urobili: 2 mg/dL   Blood: x / Protein: Trace / Nitrite: Negative   Leuk Esterase: Negative / RBC: 10 /hpf / WBC 11 /hpf   Sq Epi: x / Non Sq Epi: 14 /hpf / Bacteria: Few      CAPILLARY BLOOD GLUCOSE          RADIOLOGY & ADDITIONAL TESTS:    Consultant(s):    Care Discussed with Consultants/Other Providers [ ] YES  [ ] NO

## 2018-12-21 NOTE — CONSULT NOTE ADULT - ASSESSMENT
pt is well known to me with hx of htn,on Verapamil with hx of breast  ca, s/p mastectomy with severe OA s/p fall  last stress test few years ago was negative  continue current bp meds  dvt prophylaxis  pain management  awaiting repeat ecg  will consider echo if not done in the office with le edema. pt is well known to me with hx of htn,on Verapamil with hx of breast  ca, s/p mastectomy with severe OA s/p fall  last stress test few years ago was negative  continue current bp meds  dvt prophylaxis  pain management  pt with normal ecg, no cardiac symptoms echo normal function before.  pt is clear for surgery with mod risk

## 2018-12-21 NOTE — ED PROVIDER NOTE - NS ED ROS FT
GENERAL: No fever or chills, //             EYES: no change in vision, //             HEENT: no trouble swallowing or speaking, //             CARDIAC: no chest pain, //              PULMONARY: no cough or SOB, //             GI: no abdominal pain, no nausea or no vomiting, no diarrhea or constipation, //             : No changes in urination,  //            SKIN: no rashes,  //            NEURO: no headache,  //             MSK: r knee pain. ~Arsen Regalado DO PGY1

## 2018-12-21 NOTE — ED PROVIDER NOTE - MEDICAL DECISION MAKING DETAILS
77 yo f presents after mechanical fall. imaging/pain control. ortho. reassess. Boo: 77 yo f presents after mechanical fall. imaging/pain control. ortho. admit

## 2018-12-21 NOTE — ED PROVIDER NOTE - CARE PLAN
Principal Discharge DX:	Femur fracture Principal Discharge DX:	Femur fracture  Goal:	distal femur fracture

## 2018-12-21 NOTE — PATIENT PROFILE ADULT - LIVES WITH
SUBJECTIVE:  Fabian returns for follow up today for mild to moderate persistent asthma. Overall he states she's doing well, is playing baseball without wheezing coughing or shortness of breath. Is compliant with Asmanex but still has some symptoms. No ER or hospital visits since we saw him last 6 months ago. Allergy symptoms are stable on montelukast, loratadine and no recent nasal congestion. Asthma control test score 24/25.      Current Outpatient Prescriptions   Medication   • montelukast (SINGULAIR) 10 MG tablet   • loratadine (CLARITIN) 10 MG tablet   • Asmanex   • albuterol (VENTOLIN HFA) 108 (90 Base) MCG/ACT inhaler   • predniSONE (DELTASONE) 10 MG tablet   • albuterol (VENTOLIN) (2.5 MG/3ML) 0.083% nebulizer solution   • hydrocortisone (CORTIZONE) 2.5 % cream   • Daily Multiple Vitamins TABS   • budesonide (PULMICORT) 0.5 MG/2ML nebulizer solution     No current facility-administered medications for this visit.        Response to treatment fair.    Allergies as of 04/28/2017   • (No Known Allergies)       Interval medical history is unchanged.  He has no other significant problems or complaints.     Environmental changes: Mite covers are  in place.  Pets 2 dogs.  Tobacco exposure not present.    PHYSICAL EXAMINATION:   GENERAL: He is in no acute distress.   VITAL SIGNS:   Visit Vitals   • /62   • Pulse 78   • Resp 16   • Ht 5' 10.5\" (1.791 m)   • Wt 59 kg   • SpO2 99%   • BMI 18.39 kg/m2      HEENT exam is clear. There are no allergic shiners or sinus tenderness. Conjunctiva, pupils, and lids are clear. External and internal ear exam is clear. Nasal mucosa is pale. There is no turbinate hypertrophy, no swelling,no discharge, no bleeding, no ulcer, no polyps.  Oropharynx is clear. There is no thrush. Lips, gums and teeth are normal.   NECK: Supple and flexible with no cervical lymphadenopathy. Trachea is midline. There is no stridor.  The thyroid exam is normal.  CHEST: There are no supra or  sub-clavicular lymph nodes palpable.  There are no retractions or visible respiratory distress.  No wheezes, rales or rhonchi. There is good air movement throughout.    CARDIAC: Regular rate and rhythm. Normal S1, S2. No murmur, gallop or rub. There is no peripheral edema.   SKIN: Warm and dry.  There is no eczema, urticaria or dermatographism.   NEURO: The patient is alert and oriented x 3 with a normal affect.  The patient gives a clear medical history.    Data:    Spirometry shows early obstruction, FEV1 67 % predicted, FVC 84% predicted. The flow-volume loop is noted for mild obstruction.    ASSESSMENT AND PLAN:  Based on today's evaluation, I made the following assessment and recommendations:    1. Moderate persistent asthma. Symptoms have been relatively well controlled but his FEV1 is not improved even though he's gained several inches of height. I'm going to recommend a trial of Breo, 200 mcg once a day instead of Asmanex. Reevaluate in 2 months. Watch for side effects. Continue montelukast. Watch for tremor, watch for increased asthma symptoms, call for problems.    2. Seasonal perennial allergic rhinitis. Resuming antihistamine, continue allergy medications and call for problems.    Thank you for allowing me to participate in his care.     Jimmie Lim MD     adult child(vilma)

## 2018-12-21 NOTE — ED PROVIDER NOTE - OBJECTIVE STATEMENT
77 yo f bilateral knee arthritis,  severe scoliosis resulting in LLE short leg, HTN, remote breast ca s/p mastectomy and LN resection, provoked PE 1993, chronic pain, pw r knee pain. pt states she had unwitnessed, mechanical fall this morning, reports hx of falls. states her r knee "gave out" and fell onto her hips. reports pain as 10/10, sharp, constant, worse with movement, better with rest. reports chronic b/l knee pain, b/l hip pain. denies head trauma, syncope, cp, sob, n/v, f/c.

## 2018-12-21 NOTE — ED ADULT NURSE NOTE - OBJECTIVE STATEMENT
Patient   is  alert   and  oriented  x3.  Color  is  good  and  skin warm to touch.   Her  knees  buckle  under  her  while  walking  to  the  bathroom, then  she  fell.  Patient  is  c/o  rt  knee and  lower  leg  pain.  Swelling  noted  to  rt  knee.

## 2018-12-21 NOTE — PRE-ANESTHESIA EVALUATION ADULT - NSANTHHOMEMEDSFT_GEN_ALL_CORE
Patient with Fentanyl patch, reports additional opioid medications (possibly Dilaudid?) - no longer taking Oxycodone

## 2018-12-21 NOTE — H&P ADULT - ASSESSMENT
78F w/ R distal femur fracture  Analgesia  DVT ppx  NWB RLE  RLE in [place]   FU med re clearance for OR  FU AM labs  Will d/w attending any further recommendations

## 2018-12-21 NOTE — CONSULT NOTE ADULT - SUBJECTIVE AND OBJECTIVE BOX
CHIEF COMPLAINT:Patient is a 78y old  Female who presents with a chief complaint of s/p fall.    HPI:  9 yo f bilateral knee arthritis,  severe scoliosis resulting in LLE short leg, HTN, remote breast ca s/p mastectomy and LN resection, provoked PE , chronic pain, pw r knee pain. pt states she had unwitnessed, mechanical fall this morning, reports hx of falls. states her r knee "gave out" and fell onto her hips. reports pain as 10/10, sharp, constant, worse with movement, better with rest. reports chronic b/l knee pain, b/l hip pain. denies head trauma, syncope, cp, sob, n/v, f/c.    PAST MEDICAL & SURGICAL HISTORY:  Breast CA  Scoliosis  HTN (hypertension)  H/O shoulder surgery  Cataract  H/O: hysterectomy  H/O mastectomy, right  History of hip replacement  H/O  section      MEDICATIONS  (STANDING):  sodium chloride 0.9%. 1000 milliLiter(s) (125 mL/Hr) IV Continuous <Continuous>    MEDICATIONS  (PRN):      FAMILY HISTORY:  Family history of lung cancer: mother -   Family history of emphysema: father -   Family history of Hodgkin's disease (Sibling): sisters - living  Family history of breast cancer (Sibling): sister living      SOCIAL HISTORY:    [ ] Non-smoker  [ ] Smoker  [ ] Alcohol    Allergies    No Known Allergies    Intolerances    	    REVIEW OF SYSTEMS:  CONSTITUTIONAL: No fever, weight loss, or fatigue  EYES: No eye pain, visual disturbances, or discharge  ENT:  No difficulty hearing, tinnitus, vertigo; No sinus or throat pain  NECK: No pain or stiffness  RESPIRATORY: No cough, wheezing, chills or hemoptysis; No Shortness of Breath  CARDIOVASCULAR: No chest pain, palpitations, passing out, dizziness, or leg swelling  GASTROINTESTINAL: No abdominal or epigastric pain. No nausea, vomiting, or hematemesis; No diarrhea or constipation. No melena or hematochezia.  GENITOURINARY: No dysuria, frequency, hematuria, or incontinence  NEUROLOGICAL: No headaches, memory loss, loss of strength, numbness, or tremors  SKIN: No itching, burning, rashes, or lesions   LYMPH Nodes: No enlarged glands  ENDOCRINE: No heat or cold intolerance; No hair loss  MUSCULOSKELETAL: No joint pain or swelling; No muscle, back, or extremity pain  PSYCHIATRIC: No depression, anxiety, mood swings, or difficulty sleeping  HEME/LYMPH: No easy bruising, or bleeding gums  ALLERGY AND IMMUNOLOGIC: No hives or eczema	    [ ] All others negative	  [ ] Unable to obtain    PHYSICAL EXAM:  T(C): --  HR: 86 (18 @ 07:02) (86 - 86)  BP: 150/80 (18 @ 07:02) (150/80 - 150/80)  RR: 19 (18 @ 07:02) (19 - 19)  SpO2: 96% (18 @ 07:02) (96% - 96%)  Wt(kg): --  I&O's Summary      Appearance: Normal	  HEENT:   Normal oral mucosa, PERRL, EOMI	  Lymphatic: No lymphadenopathy  Cardiovascular: Normal S1 S2, No JVD, + murmurs, No edema  Respiratory: Lungs clear to auscultation	  Psychiatry: A & O x 3, Mood & affect appropriate  Gastrointestinal:  Soft, Non-tender, + BS	  Skin: No rashes, No ecchymoses, No cyanosis	  Neurologic: Non-focal  Extremities: Normal range of motion, No clubbing, cyanosis .+ edema  Vascular: Peripheral pulses palpable 2+ bilaterally    TELEMETRY: 	    ECG:  	  RADIOLOGY:  OTHER: 	  	  LABS:	 	    CARDIAC MARKERS:    < from: 12 Lead ECG (17 @ 01:02) >  Diagnosis Line SINUS RHYTHM  NONSPECIFIC ST ABNORMALITY    < end of copied text >                            12.5   13.8  )-----------( 229      ( 21 Dec 2018 08:42 )             37.4     12    140  |  99  |  16  ----------------------------<  117<H>  3.8   |  28  |  0.66    Ca    9.1      21 Dec 2018 08:42    TPro  6.6  /  Alb  3.6  /  TBili  0.3  /  DBili  x   /  AST  15  /  ALT  8<L>  /  AlkPhos  60      proBNP:   Lipid Profile:   HgA1c:   TSH:   PT/INR - ( 21 Dec 2018 08:42 )   PT: 13.6 sec;   INR: 1.19 ratio         PTT - ( 21 Dec 2018 08:42 )  PTT:26.2 sec    PREVIOUS DIAGNOSTIC TESTING:    < from: Xray Chest 1 View AP/PA (18 @ 08:36) >  IMPRESSION: Clear lungs.    < end of copied text >  < from: Xray Hip 2-3 Views, Right (12.21.18 @ 08:37) >  Impression: No fracture or dislocation of the right hip. Severe   osteoarthritis is present.    < end of copied text >

## 2018-12-22 LAB
ANION GAP SERPL CALC-SCNC: 10 MMOL/L — SIGNIFICANT CHANGE UP (ref 5–17)
BUN SERPL-MCNC: 11 MG/DL — SIGNIFICANT CHANGE UP (ref 7–23)
CALCIUM SERPL-MCNC: 7.7 MG/DL — LOW (ref 8.4–10.5)
CHLORIDE SERPL-SCNC: 104 MMOL/L — SIGNIFICANT CHANGE UP (ref 96–108)
CO2 SERPL-SCNC: 25 MMOL/L — SIGNIFICANT CHANGE UP (ref 22–31)
CREAT SERPL-MCNC: 0.49 MG/DL — LOW (ref 0.5–1.3)
GAS PNL BLDA: SIGNIFICANT CHANGE UP
GLUCOSE SERPL-MCNC: 199 MG/DL — HIGH (ref 70–99)
HCT VFR BLD CALC: 27.8 % — LOW (ref 34.5–45)
HGB BLD-MCNC: 9.3 G/DL — LOW (ref 11.5–15.5)
MCHC RBC-ENTMCNC: 29.3 PG — SIGNIFICANT CHANGE UP (ref 27–34)
MCHC RBC-ENTMCNC: 33.6 GM/DL — SIGNIFICANT CHANGE UP (ref 32–36)
MCV RBC AUTO: 87.3 FL — SIGNIFICANT CHANGE UP (ref 80–100)
PLATELET # BLD AUTO: 198 K/UL — SIGNIFICANT CHANGE UP (ref 150–400)
POTASSIUM SERPL-MCNC: 3.5 MMOL/L — SIGNIFICANT CHANGE UP (ref 3.5–5.3)
POTASSIUM SERPL-SCNC: 3.5 MMOL/L — SIGNIFICANT CHANGE UP (ref 3.5–5.3)
RBC # BLD: 3.18 M/UL — LOW (ref 3.8–5.2)
RBC # FLD: 12.5 % — SIGNIFICANT CHANGE UP (ref 10.3–14.5)
SODIUM SERPL-SCNC: 139 MMOL/L — SIGNIFICANT CHANGE UP (ref 135–145)
WBC # BLD: 8.8 K/UL — SIGNIFICANT CHANGE UP (ref 3.8–10.5)
WBC # FLD AUTO: 8.8 K/UL — SIGNIFICANT CHANGE UP (ref 3.8–10.5)

## 2018-12-22 PROCEDURE — 72170 X-RAY EXAM OF PELVIS: CPT | Mod: 26

## 2018-12-22 PROCEDURE — 73552 X-RAY EXAM OF FEMUR 2/>: CPT | Mod: 26,RT

## 2018-12-22 PROCEDURE — 73560 X-RAY EXAM OF KNEE 1 OR 2: CPT | Mod: 26,RT,76

## 2018-12-22 RX ORDER — HYDROMORPHONE HYDROCHLORIDE 2 MG/ML
4 INJECTION INTRAMUSCULAR; INTRAVENOUS; SUBCUTANEOUS EVERY 6 HOURS
Qty: 0 | Refills: 0 | Status: DISCONTINUED | OUTPATIENT
Start: 2018-12-22 | End: 2018-12-24

## 2018-12-22 RX ORDER — ACETAMINOPHEN 500 MG
1000 TABLET ORAL ONCE
Qty: 0 | Refills: 0 | Status: DISCONTINUED | OUTPATIENT
Start: 2018-12-22 | End: 2018-12-22

## 2018-12-22 RX ORDER — TRAMADOL HYDROCHLORIDE 50 MG/1
25 TABLET ORAL EVERY 4 HOURS
Qty: 0 | Refills: 0 | Status: DISCONTINUED | OUTPATIENT
Start: 2018-12-22 | End: 2018-12-22

## 2018-12-22 RX ORDER — HYDROMORPHONE HYDROCHLORIDE 2 MG/ML
2 INJECTION INTRAMUSCULAR; INTRAVENOUS; SUBCUTANEOUS EVERY 4 HOURS
Qty: 0 | Refills: 0 | Status: DISCONTINUED | OUTPATIENT
Start: 2018-12-22 | End: 2018-12-27

## 2018-12-22 RX ORDER — ONDANSETRON 8 MG/1
4 TABLET, FILM COATED ORAL ONCE
Qty: 0 | Refills: 0 | Status: DISCONTINUED | OUTPATIENT
Start: 2018-12-22 | End: 2018-12-22

## 2018-12-22 RX ORDER — TRAZODONE HCL 50 MG
50 TABLET ORAL AT BEDTIME
Qty: 0 | Refills: 0 | Status: DISCONTINUED | OUTPATIENT
Start: 2018-12-22 | End: 2018-12-22

## 2018-12-22 RX ORDER — FENTANYL CITRATE 50 UG/ML
1 INJECTION INTRAVENOUS
Qty: 0 | Refills: 0 | Status: DISCONTINUED | OUTPATIENT
Start: 2018-12-22 | End: 2018-12-27

## 2018-12-22 RX ORDER — TRAMADOL HYDROCHLORIDE 50 MG/1
50 TABLET ORAL EVERY 4 HOURS
Qty: 0 | Refills: 0 | Status: DISCONTINUED | OUTPATIENT
Start: 2018-12-22 | End: 2018-12-22

## 2018-12-22 RX ORDER — RIVAROXABAN 15 MG-20MG
10 KIT ORAL DAILY
Qty: 0 | Refills: 0 | Status: DISCONTINUED | OUTPATIENT
Start: 2018-12-23 | End: 2018-12-27

## 2018-12-22 RX ORDER — POLYETHYLENE GLYCOL 3350 17 G/17G
17 POWDER, FOR SOLUTION ORAL AT BEDTIME
Qty: 0 | Refills: 0 | Status: DISCONTINUED | OUTPATIENT
Start: 2018-12-22 | End: 2018-12-27

## 2018-12-22 RX ORDER — HYDROMORPHONE HYDROCHLORIDE 2 MG/ML
0.5 INJECTION INTRAMUSCULAR; INTRAVENOUS; SUBCUTANEOUS EVERY 4 HOURS
Qty: 0 | Refills: 0 | Status: DISCONTINUED | OUTPATIENT
Start: 2018-12-22 | End: 2018-12-27

## 2018-12-22 RX ORDER — HYDROMORPHONE HYDROCHLORIDE 2 MG/ML
0.5 INJECTION INTRAMUSCULAR; INTRAVENOUS; SUBCUTANEOUS
Qty: 0 | Refills: 0 | Status: DISCONTINUED | OUTPATIENT
Start: 2018-12-22 | End: 2018-12-22

## 2018-12-22 RX ORDER — TRAMADOL HYDROCHLORIDE 50 MG/1
50 TABLET ORAL EVERY 6 HOURS
Qty: 0 | Refills: 0 | Status: DISCONTINUED | OUTPATIENT
Start: 2018-12-22 | End: 2018-12-27

## 2018-12-22 RX ORDER — ACETAMINOPHEN 500 MG
1000 TABLET ORAL ONCE
Qty: 0 | Refills: 0 | Status: COMPLETED | OUTPATIENT
Start: 2018-12-22 | End: 2018-12-22

## 2018-12-22 RX ADMIN — POLYETHYLENE GLYCOL 3350 17 GRAM(S): 17 POWDER, FOR SOLUTION ORAL at 21:09

## 2018-12-22 RX ADMIN — Medication 1 MILLIGRAM(S): at 11:41

## 2018-12-22 RX ADMIN — Medication 100 MILLIGRAM(S): at 21:09

## 2018-12-22 RX ADMIN — Medication 100 MILLIGRAM(S): at 13:00

## 2018-12-22 RX ADMIN — Medication 1 TABLET(S): at 11:40

## 2018-12-22 RX ADMIN — FENTANYL CITRATE 1 PATCH: 50 INJECTION INTRAVENOUS at 11:40

## 2018-12-22 RX ADMIN — Medication 500 MILLIGRAM(S): at 17:26

## 2018-12-22 RX ADMIN — Medication 10 MILLIGRAM(S): at 11:41

## 2018-12-22 RX ADMIN — Medication 325 MILLIGRAM(S): at 08:02

## 2018-12-22 RX ADMIN — DULOXETINE HYDROCHLORIDE 60 MILLIGRAM(S): 30 CAPSULE, DELAYED RELEASE ORAL at 11:41

## 2018-12-22 RX ADMIN — HYDROMORPHONE HYDROCHLORIDE 4 MILLIGRAM(S): 2 INJECTION INTRAMUSCULAR; INTRAVENOUS; SUBCUTANEOUS at 17:56

## 2018-12-22 RX ADMIN — Medication 325 MILLIGRAM(S): at 11:40

## 2018-12-22 RX ADMIN — HYDROMORPHONE HYDROCHLORIDE 4 MILLIGRAM(S): 2 INJECTION INTRAMUSCULAR; INTRAVENOUS; SUBCUTANEOUS at 17:26

## 2018-12-22 RX ADMIN — Medication 40 MILLIGRAM(S): at 06:19

## 2018-12-22 RX ADMIN — FENTANYL CITRATE 1 PATCH: 50 INJECTION INTRAVENOUS at 19:40

## 2018-12-22 RX ADMIN — Medication 180 MILLIGRAM(S): at 06:20

## 2018-12-22 RX ADMIN — Medication 1000 MILLIGRAM(S): at 21:38

## 2018-12-22 RX ADMIN — PANTOPRAZOLE SODIUM 40 MILLIGRAM(S): 20 TABLET, DELAYED RELEASE ORAL at 08:02

## 2018-12-22 RX ADMIN — SODIUM CHLORIDE 75 MILLILITER(S): 9 INJECTION, SOLUTION INTRAVENOUS at 05:01

## 2018-12-22 RX ADMIN — Medication 100 MILLIGRAM(S): at 10:13

## 2018-12-22 RX ADMIN — Medication 100 MILLIGRAM(S): at 06:19

## 2018-12-22 RX ADMIN — Medication 325 MILLIGRAM(S): at 17:26

## 2018-12-22 RX ADMIN — Medication 500 MILLIGRAM(S): at 06:19

## 2018-12-22 RX ADMIN — Medication 100 MILLIGRAM(S): at 17:26

## 2018-12-22 RX ADMIN — Medication 400 MILLIGRAM(S): at 21:08

## 2018-12-22 NOTE — PROGRESS NOTE ADULT - SUBJECTIVE AND OBJECTIVE BOX
CARDIOLOGY     PROGRESS  NOTE   ________________________________________________    CHIEF COMPLAINT:Patient is a 78y old  Female who presents with a chief complaint of Right distal femur fracture (22 Dec 2018 06:30)  doing better.  	  REVIEW OF SYSTEMS:  CONSTITUTIONAL: No fever, weight loss, or fatigue  EYES: No eye pain, visual disturbances, or discharge  ENT:  No difficulty hearing, tinnitus, vertigo; No sinus or throat pain  NECK: No pain or stiffness  RESPIRATORY: No cough, wheezing, chills or hemoptysis; no  Shortness of Breath  CARDIOVASCULAR: No chest pain, palpitations, passing out, dizziness, or leg swelling  GASTROINTESTINAL: No abdominal or epigastric pain. No nausea, vomiting, or hematemesis; No diarrhea or constipation. No melena or hematochezia.  GENITOURINARY: No dysuria, frequency, hematuria, or incontinence  NEUROLOGICAL: No headaches, memory loss, loss of strength, numbness, or tremors  SKIN: No itching, burning, rashes, or lesions   LYMPH Nodes: No enlarged glands  ENDOCRINE: No heat or cold intolerance; No hair loss  MUSCULOSKELETAL: No joint pain or swelling; No muscle, back, or extremity pain  PSYCHIATRIC: No depression, anxiety, mood swings, or difficulty sleeping  HEME/LYMPH: No easy bruising, or bleeding gums  ALLERGY AND IMMUNOLOGIC: No hives or eczema	    [ ] All others negative	  [ ] Unable to obtain    PHYSICAL EXAM:  T(C): 36.5 (12-22-18 @ 06:58), Max: 36.9 (12-21-18 @ 12:28)  HR: 79 (12-22-18 @ 06:58) (64 - 88)  BP: 116/74 (12-22-18 @ 06:58) (101/67 - 132/64)  RR: 18 (12-22-18 @ 06:58) (15 - 18)  SpO2: 96% (12-22-18 @ 06:58) (91% - 100%)  Wt(kg): --  I&O's Summary    21 Dec 2018 07:01  -  22 Dec 2018 07:00  --------------------------------------------------------  IN: 1150 mL / OUT: 690 mL / NET: 460 mL        Appearance: Normal	  HEENT:   Normal oral mucosa, PERRL, EOMI	  Lymphatic: No lymphadenopathy  Cardiovascular: Normal S1 S2, No JVD, + murmurs, No edema  Respiratory: Lungs clear to auscultation	  Psychiatry: A & O x 3, Mood & affect appropriate  Gastrointestinal:  Soft, Non-tender, + BS	  Skin: No rashes, No ecchymoses, No cyanosis	  Neurologic: Non-focal  Extremities: Normal range of motion, No clubbing, cyanosis or edema  Vascular: Peripheral pulses palpable 2+ bilaterally    MEDICATIONS  (STANDING):  ascorbic acid 500 milliGRAM(s) Oral two times a day  baclofen 10 milliGRAM(s) Oral daily  ceFAZolin   IVPB 2000 milliGRAM(s) IV Intermittent every 8 hours  diltiazem    milliGRAM(s) Oral daily  docusate sodium 100 milliGRAM(s) Oral three times a day  DULoxetine 60 milliGRAM(s) Oral daily  ferrous    sulfate 325 milliGRAM(s) Oral three times a day with meals  folic acid 1 milliGRAM(s) Oral daily  furosemide    Tablet 40 milliGRAM(s) Oral daily  lactated ringers. 1000 milliLiter(s) (75 mL/Hr) IV Continuous <Continuous>  multivitamin 1 Tablet(s) Oral daily  pantoprazole    Tablet 40 milliGRAM(s) Oral before breakfast  traZODone 50 milliGRAM(s) Oral at bedtime      TELEMETRY: 	    ECG:  	  RADIOLOGY:  OTHER: 	  	  LABS:	 	    CARDIAC MARKERS:                                9.3    8.8   )-----------( 198      ( 22 Dec 2018 03:39 )             27.8     12-22    139  |  104  |  11  ----------------------------<  199<H>  3.5   |  25  |  0.49<L>    Ca    7.7<L>      22 Dec 2018 03:39    TPro  6.6  /  Alb  3.6  /  TBili  0.3  /  DBili  x   /  AST  15  /  ALT  8<L>  /  AlkPhos  60  12-21    proBNP:   Lipid Profile:   HgA1c:   TSH:   PT/INR - ( 21 Dec 2018 08:42 )   PT: 13.6 sec;   INR: 1.19 ratio         PTT - ( 21 Dec 2018 08:42 )  PTT:26.2 sec      Assessment and plan  ---------------------------  doing well over all  continue cardiac meds  dvt prophylaxis

## 2018-12-22 NOTE — PHYSICAL THERAPY INITIAL EVALUATION ADULT - ADDITIONAL COMMENTS
Pt lives with daughter and son in an apartment with 19steps to enter. Prior to admission pt was independent with use of RW, wheelchair for longer distances, assist for stair negotiation. Pt states limited ambulation at baseline.

## 2018-12-22 NOTE — BRIEF OPERATIVE NOTE - PROCEDURE
<<-----Click on this checkbox to enter Procedure Open reduction and internal fixation (ORIF) of fracture of distal femur  12/22/2018    Active  [id number]

## 2018-12-22 NOTE — PHYSICAL THERAPY INITIAL EVALUATION ADULT - GENERAL OBSERVATIONS, REHAB EVAL
pt received semi-supine in bed in [facility], premedicated by RENÉE Palmer, splint and ace wrap +IV

## 2018-12-22 NOTE — PHYSICAL THERAPY INITIAL EVALUATION ADULT - PERTINENT HX OF CURRENT PROBLEM, REHAB EVAL
78y F c/o R knee pain s/p mechanical fall in the bathroom. States she is minimally ambulatory around the house with a walker. When leaving the house, she uses a wheelchair. Patient also states she has two ulcers on her buttocks 2/2 remaining in seated positions for extended periods of time. XRayRKnee:There is a comminuted and displaced fracture through the distal right femur at the metaphysis. There is a moderate suprapatellar joint effusion.XRayRHip(-) CXR(-)

## 2018-12-22 NOTE — PROGRESS NOTE ADULT - SUBJECTIVE AND OBJECTIVE BOX
Patient is a 78y old  Female who presents with a chief complaint of Right distal femur fracture (21 Dec 2018 11:31)      HPI:  78yFemale c/o R knee pain s/p mechanical fall in the bathroom. Patient denies head hit or LOC. Patient denies numbness or tingling in the RLE. Patient denies any other injuries. States she is minimally ambulatory around the house with a walker. When leaving the house, she uses a wheelchair. Patient also states she has two ulcers on her buttocks 2/2 remaining in seated positions for extended periods of time. She is s/p ORIF of right distal femur fracture.    MEDICATIONS  (STANDING):  ascorbic acid 500 milliGRAM(s) Oral two times a day  aspirin  chewable 81 milliGRAM(s) Oral daily  baclofen 10 milliGRAM(s) Oral daily  diltiazem    milliGRAM(s) Oral daily  docusate sodium 100 milliGRAM(s) Oral three times a day  DULoxetine 60 milliGRAM(s) Oral daily  ferrous    sulfate 325 milliGRAM(s) Oral three times a day with meals  furosemide    Tablet 40 milliGRAM(s) Oral daily  multivitamin 1 Tablet(s) Oral daily  pantoprazole    Tablet 40 milliGRAM(s) Oral before breakfast  sodium chloride 0.9%. 1000 milliLiter(s) (125 mL/Hr) IV Continuous <Continuous>  traZODone 50 milliGRAM(s) Oral at bedtime    MEDICATIONS  (PRN):  diphenhydrAMINE 25 milliGRAM(s) Oral at bedtime PRN Insomnia  HYDROmorphone  Injectable 1 milliGRAM(s) IV Push every 4 hours PRN Severe Pain (7 - 10)  oxyCODONE    IR 10 milliGRAM(s) Oral every 4 hours PRN Moderate Pain (4 - 6)  oxyCODONE    IR 5 milliGRAM(s) Oral every 4 hours PRN Mild Pain (1 - 3)      Allergies    No Known Allergies    Intolerances      PAST MEDICAL HISTORY:  Breast CA  Scoliosis  HTN (hypertension)  decubitus ulcers on buttocks    PAST SURGICAL HISTORY:  H/O shoulder surgery  Cataract  H/O: hysterectomy  H/O mastectomy, right  History of hip replacement  H/O  section      Diet:  (   ) Regular   ( x  ) Low Sodium   (   ) Low Cholesterol   (   ) Diabetic   (   ) Other    FAMILY HISTORY:  Family history of lung cancer: mother -   Family history of emphysema: father -   Family history of Hodgkin's disease (Sibling): sisters - living  Family history of breast cancer (Sibling): sister living      SOCIAL HISTORY:    Substance Use: ( x ) never used  (  ) other:  Tobacco Usage:  (   ) never smoked   (   ) former smoker   (   ) current smoker  (   ) pack years  (   ) last cigarette date  Alcohol Usage: Nne  Advanced Directives: (   ) None    (   ) DNR    (   ) DNI    (   ) Health Care Proxy:       VITALS:    T(C): 36.5 (18 @ 06:58), Max: 36.9 (18 @ 12:28)  HR: 79 (18 @ 06:58) (64 - 88)  BP: 116/74 (18 @ 06:58) (101/67 - 132/64)  RR: 18 (18 @ 06:58) (15 - 18)  SpO2: 96% (18 @ 06:58) (91% - 100%)  Wt(kg): --      PHYSICAL EXAM:  GENERAL: NAD, well nourished and conversant  HEAD:  Atraumatic  EYES: EOM, PERRLA, conjunctiva pink and sclera white  ENT: No tonsillar erythema, exudates, or enlargement, moist mucous membranes, good dentition, no lesions  NECK: Supple, No JVD, normal thyroid, carotids with normal upstrokes and no bruits  CHEST/LUNG: Clear to auscultation bilaterally, No rales, rhonchi, wheezing, or rubs  HEART: Regular rate and rhythm, No murmurs, rubs, or gallops  ABDOMEN: Soft, nondistended, no masses, guarding, tenderness or rebound, bowel sounds present  EXTREMITIES:  2+ Peripheral Pulses, No clubbing, cyanosis, or edema.    (+) right distal femur fracture  LYMPH: No lymphadenopathy noted  SKIN: No rashes or lesions decubitus ulcers on buttocks  NERVOUS SYSTEM:  Alert & Oriented X3, normal cognitive function. Motor Strength 5/5 right upper and right lower.  5/5 left upper and left lower extremities, DTRs 2+ intact and symmetric    LABS:             9.3    8.8   )-----------( 198      ( 22 Dec 2018 03:39 )             27.8         139  |  104  |  11  ----------------------------<  199<H>  3.5   |  25  |  0.49<L>    Ca    7.7<L>      22 Dec 2018 03:39    TPro  6.6  /  Alb  3.6  /  TBili  0.3  /  DBili  x   /  AST  15  /  ALT  8<L>  /  AlkPhos  60  12-21        RADIOLOGY & ADDITIONAL TESTS:    Consultant(s):    Care Discussed with Consultants/Other Providers [ ] YES  [ ] NO

## 2018-12-23 LAB
ANION GAP SERPL CALC-SCNC: 9 MMOL/L — SIGNIFICANT CHANGE UP (ref 5–17)
BUN SERPL-MCNC: 15 MG/DL — SIGNIFICANT CHANGE UP (ref 7–23)
CALCIUM SERPL-MCNC: 8.2 MG/DL — LOW (ref 8.4–10.5)
CHLORIDE SERPL-SCNC: 103 MMOL/L — SIGNIFICANT CHANGE UP (ref 96–108)
CO2 SERPL-SCNC: 29 MMOL/L — SIGNIFICANT CHANGE UP (ref 22–31)
CREAT SERPL-MCNC: 0.69 MG/DL — SIGNIFICANT CHANGE UP (ref 0.5–1.3)
GLUCOSE SERPL-MCNC: 105 MG/DL — HIGH (ref 70–99)
HCT VFR BLD CALC: 22.3 % — LOW (ref 34.5–45)
HCT VFR BLD CALC: 24.6 % — LOW (ref 34.5–45)
HGB BLD-MCNC: 7 G/DL — CRITICAL LOW (ref 11.5–15.5)
HGB BLD-MCNC: 7.8 G/DL — LOW (ref 11.5–15.5)
MCHC RBC-ENTMCNC: 26.6 PG — LOW (ref 27–34)
MCHC RBC-ENTMCNC: 28.2 PG — SIGNIFICANT CHANGE UP (ref 27–34)
MCHC RBC-ENTMCNC: 31.4 GM/DL — LOW (ref 32–36)
MCHC RBC-ENTMCNC: 31.7 GM/DL — LOW (ref 32–36)
MCV RBC AUTO: 84 FL — SIGNIFICANT CHANGE UP (ref 80–100)
MCV RBC AUTO: 89.9 FL — SIGNIFICANT CHANGE UP (ref 80–100)
PLATELET # BLD AUTO: 208 K/UL — SIGNIFICANT CHANGE UP (ref 150–400)
PLATELET # BLD AUTO: 213 K/UL — SIGNIFICANT CHANGE UP (ref 150–400)
POTASSIUM SERPL-MCNC: 3.7 MMOL/L — SIGNIFICANT CHANGE UP (ref 3.5–5.3)
POTASSIUM SERPL-SCNC: 3.7 MMOL/L — SIGNIFICANT CHANGE UP (ref 3.5–5.3)
RBC # BLD: 2.48 M/UL — LOW (ref 3.8–5.2)
RBC # BLD: 2.93 M/UL — LOW (ref 3.8–5.2)
RBC # FLD: 14.6 % — HIGH (ref 10.3–14.5)
RBC # FLD: 17.1 % — HIGH (ref 10.3–14.5)
SODIUM SERPL-SCNC: 141 MMOL/L — SIGNIFICANT CHANGE UP (ref 135–145)
WBC # BLD: 10.14 K/UL — SIGNIFICANT CHANGE UP (ref 3.8–10.5)
WBC # BLD: 9.61 K/UL — SIGNIFICANT CHANGE UP (ref 3.8–10.5)
WBC # FLD AUTO: 10.14 K/UL — SIGNIFICANT CHANGE UP (ref 3.8–10.5)
WBC # FLD AUTO: 9.61 K/UL — SIGNIFICANT CHANGE UP (ref 3.8–10.5)

## 2018-12-23 RX ADMIN — HYDROMORPHONE HYDROCHLORIDE 4 MILLIGRAM(S): 2 INJECTION INTRAMUSCULAR; INTRAVENOUS; SUBCUTANEOUS at 06:40

## 2018-12-23 RX ADMIN — FENTANYL CITRATE 1 PATCH: 50 INJECTION INTRAVENOUS at 19:36

## 2018-12-23 RX ADMIN — HYDROMORPHONE HYDROCHLORIDE 4 MILLIGRAM(S): 2 INJECTION INTRAMUSCULAR; INTRAVENOUS; SUBCUTANEOUS at 06:10

## 2018-12-23 RX ADMIN — HYDROMORPHONE HYDROCHLORIDE 4 MILLIGRAM(S): 2 INJECTION INTRAMUSCULAR; INTRAVENOUS; SUBCUTANEOUS at 18:30

## 2018-12-23 RX ADMIN — Medication 100 MILLIGRAM(S): at 06:10

## 2018-12-23 RX ADMIN — Medication 180 MILLIGRAM(S): at 06:40

## 2018-12-23 RX ADMIN — HYDROMORPHONE HYDROCHLORIDE 4 MILLIGRAM(S): 2 INJECTION INTRAMUSCULAR; INTRAVENOUS; SUBCUTANEOUS at 12:05

## 2018-12-23 RX ADMIN — PANTOPRAZOLE SODIUM 40 MILLIGRAM(S): 20 TABLET, DELAYED RELEASE ORAL at 06:09

## 2018-12-23 RX ADMIN — HYDROMORPHONE HYDROCHLORIDE 4 MILLIGRAM(S): 2 INJECTION INTRAMUSCULAR; INTRAVENOUS; SUBCUTANEOUS at 12:35

## 2018-12-23 RX ADMIN — Medication 325 MILLIGRAM(S): at 17:42

## 2018-12-23 RX ADMIN — FENTANYL CITRATE 1 PATCH: 50 INJECTION INTRAVENOUS at 07:15

## 2018-12-23 RX ADMIN — DULOXETINE HYDROCHLORIDE 60 MILLIGRAM(S): 30 CAPSULE, DELAYED RELEASE ORAL at 12:05

## 2018-12-23 RX ADMIN — Medication 500 MILLIGRAM(S): at 06:10

## 2018-12-23 RX ADMIN — Medication 100 MILLIGRAM(S): at 12:57

## 2018-12-23 RX ADMIN — RIVAROXABAN 10 MILLIGRAM(S): KIT at 12:05

## 2018-12-23 RX ADMIN — Medication 40 MILLIGRAM(S): at 06:10

## 2018-12-23 RX ADMIN — Medication 1 MILLIGRAM(S): at 12:05

## 2018-12-23 RX ADMIN — Medication 325 MILLIGRAM(S): at 08:10

## 2018-12-23 RX ADMIN — Medication 500 MILLIGRAM(S): at 17:42

## 2018-12-23 RX ADMIN — Medication 1 TABLET(S): at 12:05

## 2018-12-23 RX ADMIN — Medication 10 MILLIGRAM(S): at 12:05

## 2018-12-23 NOTE — PROGRESS NOTE ADULT - SUBJECTIVE AND OBJECTIVE BOX
CARDIOLOGY     PROGRESS  NOTE   ________________________________________________    CHIEF COMPLAINT:Patient is a 78y old  Female who presents with a chief complaint of Right distal femur fracture (23 Dec 2018 07:30)  doing well.  	  REVIEW OF SYSTEMS:  CONSTITUTIONAL: No fever, weight loss, or fatigue  EYES: No eye pain, visual disturbances, or discharge  ENT:  No difficulty hearing, tinnitus, vertigo; No sinus or throat pain  NECK: No pain or stiffness  RESPIRATORY: No cough, wheezing, chills or hemoptysis; No Shortness of Breath  CARDIOVASCULAR: No chest pain, palpitations, passing out, dizziness, or leg swelling  GASTROINTESTINAL: No abdominal or epigastric pain. No nausea, vomiting, or hematemesis; No diarrhea or constipation. No melena or hematochezia.  GENITOURINARY: No dysuria, frequency, hematuria, or incontinence  NEUROLOGICAL: No headaches, memory loss, loss of strength, numbness, or tremors  SKIN: No itching, burning, rashes, or lesions   LYMPH Nodes: No enlarged glands  ENDOCRINE: No heat or cold intolerance; No hair loss  MUSCULOSKELETAL: No joint pain or swelling; No muscle, back, or extremity pain, s/p hip surgery  PSYCHIATRIC: No depression, anxiety, mood swings, or difficulty sleeping  HEME/LYMPH: No easy bruising, or bleeding gums  ALLERGY AND IMMUNOLOGIC: No hives or eczema	    [ ] All others negative	  [ ] Unable to obtain    PHYSICAL EXAM:  T(C): 36.7 (12-23-18 @ 08:15), Max: 37.3 (12-23-18 @ 01:15)  HR: 67 (12-23-18 @ 08:15) (67 - 97)  BP: 132/71 (12-23-18 @ 08:15) (92/57 - 132/71)  RR: 18 (12-23-18 @ 08:15) (18 - 18)  SpO2: 97% (12-23-18 @ 08:15) (91% - 97%)  Wt(kg): --  I&O's Summary    22 Dec 2018 07:01  -  23 Dec 2018 07:00  --------------------------------------------------------  IN: 740 mL / OUT: 1425 mL / NET: -685 mL    23 Dec 2018 07:01  -  23 Dec 2018 10:43  --------------------------------------------------------  IN: 500 mL / OUT: 1400 mL / NET: -900 mL        Appearance: Normal	  HEENT:   Normal oral mucosa, PERRL, EOMI	  Lymphatic: No lymphadenopathy  Cardiovascular: Normal S1 S2, No JVD, + murmurs, No edema  Respiratory: Lungs clear to auscultation	  Psychiatry: A & O x 3, Mood & affect appropriate  Gastrointestinal:  Soft, Non-tender, + BS	  Skin: No rashes, No ecchymoses, No cyanosis	  Neurologic: Non-focal  Extremities: Normal range of motion, No clubbing, cyanosis or edema  Vascular: Peripheral pulses palpable 2+ bilaterally    MEDICATIONS  (STANDING):  ascorbic acid 500 milliGRAM(s) Oral two times a day  baclofen 10 milliGRAM(s) Oral daily  diltiazem    milliGRAM(s) Oral daily  docusate sodium 100 milliGRAM(s) Oral three times a day  DULoxetine 60 milliGRAM(s) Oral daily  fentaNYL   Patch  50 MICROgram(s)/Hr 1 Patch Transdermal every 72 hours  ferrous    sulfate 325 milliGRAM(s) Oral three times a day with meals  folic acid 1 milliGRAM(s) Oral daily  furosemide    Tablet 40 milliGRAM(s) Oral daily  lactated ringers. 1000 milliLiter(s) (75 mL/Hr) IV Continuous <Continuous>  multivitamin 1 Tablet(s) Oral daily  pantoprazole    Tablet 40 milliGRAM(s) Oral before breakfast  polyethylene glycol 3350 17 Gram(s) Oral at bedtime  rivaroxaban 10 milliGRAM(s) Oral daily      TELEMETRY: 	    ECG:  	  RADIOLOGY:  OTHER: 	  	  LABS:	 	    CARDIAC MARKERS:                                7.0    9.61  )-----------( 208      ( 23 Dec 2018 08:20 )             22.3     12-23    141  |  103  |  15  ----------------------------<  105<H>  3.7   |  29  |  0.69    Ca    8.2<L>      23 Dec 2018 06:46      proBNP:   Lipid Profile:   HgA1c:   TSH:         Assessment and plan  ---------------------------  s/p hip surgery  transfuse keep hgb >8  continue current meds  dvt prophylaxis

## 2018-12-23 NOTE — PROGRESS NOTE ADULT - SUBJECTIVE AND OBJECTIVE BOX
Patient is a 78y old  Female who presents with a chief complaint of Right distal femur fracture (23 Dec 2018 10:43)      HPI:    MEDICATIONS  (STANDING):  ascorbic acid 500 milliGRAM(s) Oral two times a day  baclofen 10 milliGRAM(s) Oral daily  diltiazem    milliGRAM(s) Oral daily  docusate sodium 100 milliGRAM(s) Oral three times a day  DULoxetine 60 milliGRAM(s) Oral daily  fentaNYL   Patch  50 MICROgram(s)/Hr 1 Patch Transdermal every 72 hours  ferrous    sulfate 325 milliGRAM(s) Oral three times a day with meals  folic acid 1 milliGRAM(s) Oral daily  furosemide    Tablet 40 milliGRAM(s) Oral daily  lactated ringers. 1000 milliLiter(s) (75 mL/Hr) IV Continuous <Continuous>  multivitamin 1 Tablet(s) Oral daily  pantoprazole    Tablet 40 milliGRAM(s) Oral before breakfast  polyethylene glycol 3350 17 Gram(s) Oral at bedtime  rivaroxaban 10 milliGRAM(s) Oral daily    MEDICATIONS  (PRN):  acetaminophen   Tablet .. 650 milliGRAM(s) Oral every 6 hours PRN Temp greater or equal to 38C (100.4F), Mild Pain (1 - 3)  HYDROmorphone   Tablet 2 milliGRAM(s) Oral every 4 hours PRN Moderate Pain (4 - 6)  HYDROmorphone   Tablet 4 milliGRAM(s) Oral every 6 hours PRN Severe Pain (7 - 10)  HYDROmorphone  Injectable 0.5 milliGRAM(s) IV Push every 4 hours PRN breakthrough  magnesium hydroxide Suspension 30 milliLiter(s) Oral daily PRN Constipation  ondansetron Injectable 4 milliGRAM(s) IV Push every 6 hours PRN Nausea and/or Vomiting  traMADol 50 milliGRAM(s) Oral every 6 hours PRN Mild Pain (1 - 3)      Allergies    No Known Allergies    Intolerances        REVIEW OF SYSTEM:  CONSTITUTIONAL: No fever, No change in weight, No fatigue  HEAD: No headache, No dizziness, No recent trauma  EYES: No eye pain, No visual disturbances, No discharge  ENT:  No difficulty hearing, No tinnitus, No vertigo, No sinus pain, No throat pain  NECK: No pain, No stiffness  BREASTS: No pain, No masses, No nipple discharge  RESPIRATORY: No cough, No wheezing, No chills, No hemoptysis, No shortness of breath at rest or exertional shortness of breath  CARDIOVASCULAR: No chest pain, No palpitations, No dizziness, No CHF, No arrhythmia, No cardiomegaly, No leg swelling  GASTROINTESTINAL: No abdominal, No epigastric pain. No nausea, No vomiting, No hematemesis, No diarrhea, No constipation. No melena, No hematochezia. No GERD  GENITOURINARY: No dysuria, No frequency, No hematuria, No incontinence, No nocturia, No hesitancy,  SKIN: No itching, No burning, No rashes, No lesions   LYMPH NODES: No history of enlarged glands  ENDOCRINE: No heat or cold intolerance, No hair loss. No osteoporosis, No thyroid disease  MUSCULOSKELETAL: No joint pain or swelling, No muscle, back, or extremity pain  PSYCHIATRIC: No depression, No anxiety, No mood swings, No difficulty sleeping  HEME/LYMPH: No easy bruising, No anticoagulants, No bleeding disorder, No bleeding gums  ALLERGY AND IMMUNOLOGIC: No hives, No eczema  NEUROLOGICAL: No memory loss, No loss of strength, No numbness, No tremors        VITALS:   T(C): 36.8 (12-23-18 @ 15:15), Max: 37.3 (12-23-18 @ 01:15)  HR: 84 (12-23-18 @ 15:15) (67 - 86)  BP: 115/71 (12-23-18 @ 15:15) (92/57 - 132/71)  RR: 18 (12-23-18 @ 15:15) (18 - 18)  SpO2: 96% (12-23-18 @ 15:15) (91% - 97%)  Wt(kg): --    12-22 @ 07:01  -  12-23 @ 07:00  --------------------------------------------------------  IN: 740 mL / OUT: 1425 mL / NET: -685 mL    12-23 @ 07:01  -  12-23 @ 20:09  --------------------------------------------------------  IN: 900 mL / OUT: 2300 mL / NET: -1400 mL        PHYSICAL EXAM:  GENERAL: NAD, well nourished and conversant  HEAD:  Atraumatic  EYES: EOM, PERRLA, conjunctiva pink and sclera white  ENT: No tonsillar erythema, exudates, or enlargement, moist mucous membranes, good dentition, no lesions  NECK: Supple, No JVD, normal thyroid, carotids with normal upstrokes and no bruits  CHEST/LUNG: Clear to auscultation bilaterally, No rales, rhonchi, wheezing, or rubs  HEART: Regular rate and rhythm, No murmurs, rubs, or gallops  ABDOMEN: Soft, nondistended, no masses, guarding, tenderness or rebound, bowel sounds present  EXTREMITIES:  2+ Peripheral Pulses, No clubbing, cyanosis, or edema. No arthritis of shoulders, elbows, hands, hips, knees, ankles, or feet. No DJD C spine, T spine, or L/S spine  LYMPH: No lymphadenopathy noted  SKIN: No rashes or lesions  NERVOUS SYSTEM:  Alert & Oriented X3, normal cognitive function. Motor Strength 5/5 right upper and right lower.  5/5 left upper and left lower extremities, DTRs 2+ intact and symmetric    LABS:                          7.0    9.61  )-----------( 208      ( 23 Dec 2018 08:20 )             22.3     12-23    141  |  103  |  15  ----------------------------<  105<H>  3.7   |  29  |  0.69  12-22    139  |  104  |  11  ----------------------------<  199<H>  3.5   |  25  |  0.49<L>  12-21    140  |  99  |  16  ----------------------------<  117<H>  3.8   |  28  |  0.66    Ca    8.2<L>      23 Dec 2018 06:46  Ca    7.7<L>      22 Dec 2018 03:39  Ca    9.1      21 Dec 2018 08:42    TPro  6.6  /  Alb  3.6  /  TBili  0.3  /  DBili  x   /  AST  15  /  ALT  8<L>  /  AlkPhos  60  12-21    CAPILLARY BLOOD GLUCOSE          RADIOLOGY & ADDITIONAL TESTS:      Consultant(s):    Care Discussed with Consultants/Other Providers [ ] YES  [ ] NO Patient is a 78y old  Female who presents with a chief complaint of Right distal femur fracture (23 Dec 2018 10:43)      HPI:  78yFemale c/o R knee pain s/p mechanical fall in the bathroom. Patient denies head hit or LOC. Patient denies numbness or tingling in the RLE. Patient denies any other injuries. States she is minimally ambulatory around the house with a walker. When leaving the house, she uses a wheelchair. Patient also states she has two ulcers on her buttocks 2/2 remaining in seated positions for extended periods of time. She is s/p ORIF of right distal femur fracture. She needs care of her buttock decubiti as well.    MEDICATIONS  (STANDING):  ascorbic acid 500 milliGRAM(s) Oral two times a day  baclofen 10 milliGRAM(s) Oral daily  diltiazem    milliGRAM(s) Oral daily  docusate sodium 100 milliGRAM(s) Oral three times a day  DULoxetine 60 milliGRAM(s) Oral daily  fentaNYL   Patch  50 MICROgram(s)/Hr 1 Patch Transdermal every 72 hours  ferrous    sulfate 325 milliGRAM(s) Oral three times a day with meals  folic acid 1 milliGRAM(s) Oral daily  furosemide    Tablet 40 milliGRAM(s) Oral daily  lactated ringers. 1000 milliLiter(s) (75 mL/Hr) IV Continuous <Continuous>  multivitamin 1 Tablet(s) Oral daily  pantoprazole    Tablet 40 milliGRAM(s) Oral before breakfast  polyethylene glycol 3350 17 Gram(s) Oral at bedtime  rivaroxaban 10 milliGRAM(s) Oral daily    MEDICATIONS  (PRN):  acetaminophen   Tablet .. 650 milliGRAM(s) Oral every 6 hours PRN Temp greater or equal to 38C (100.4F), Mild Pain (1 - 3)  HYDROmorphone   Tablet 2 milliGRAM(s) Oral every 4 hours PRN Moderate Pain (4 - 6)  HYDROmorphone   Tablet 4 milliGRAM(s) Oral every 6 hours PRN Severe Pain (7 - 10)  HYDROmorphone  Injectable 0.5 milliGRAM(s) IV Push every 4 hours PRN breakthrough  magnesium hydroxide Suspension 30 milliLiter(s) Oral daily PRN Constipation  ondansetron Injectable 4 milliGRAM(s) IV Push every 6 hours PRN Nausea and/or Vomiting  traMADol 50 milliGRAM(s) Oral every 6 hours PRN Mild Pain (1 - 3)      Allergies    No Known Allergies    Intolerances          VITALS:   T(C): 36.8 (12-23-18 @ 15:15), Max: 37.3 (12-23-18 @ 01:15)  HR: 84 (12-23-18 @ 15:15) (67 - 86)  BP: 115/71 (12-23-18 @ 15:15) (92/57 - 132/71)  RR: 18 (12-23-18 @ 15:15) (18 - 18)  SpO2: 96% (12-23-18 @ 15:15) (91% - 97%)  Wt(kg): --    12-22 @ 07:01  -  12-23 @ 07:00  --------------------------------------------------------  IN: 740 mL / OUT: 1425 mL / NET: -685 mL    12-23 @ 07:01  -  12-23 @ 20:09  --------------------------------------------------------  IN: 900 mL / OUT: 2300 mL / NET: -1400 mL        PHYSICAL EXAM:  GENERAL: NAD, well nourished and conversant  HEAD:  Atraumatic  EYES: EOM, PERRLA, conjunctiva pink and sclera white  ENT: No tonsillar erythema, exudates, or enlargement, moist mucous membranes, good dentition, no lesions  NECK: Supple, No JVD, normal thyroid, carotids with normal upstrokes and no bruits  CHEST/LUNG: Clear to auscultation bilaterally, No rales, rhonchi, wheezing, or rubs  HEART: Regular rate and rhythm, No murmurs, rubs, or gallops  ABDOMEN: Soft, nondistended, no masses, guarding, tenderness or rebound, bowel sounds present  EXTREMITIES:  2+ Peripheral Pulses, No clubbing, cyanosis, or edema.  ORIF right distal femur fracture  SKIN: No rashes or lesions  NERVOUS SYSTEM:  Alert & Oriented X3, normal cognitive function. Motor Strength 5/5 right upper and right lower.  5/5 left upper and left lower extremities, DTRs 2+ intact and symmetric    LABS:                          7.0    9.61  )-----------( 208      ( 23 Dec 2018 08:20 )             22.3     12-23    141  |  103  |  15  ----------------------------<  105<H>  3.7   |  29  |  0.69  12-22    139  |  104  |  11  ----------------------------<  199<H>  3.5   |  25  |  0.49<L>  12-21    140  |  99  |  16  ----------------------------<  117<H>  3.8   |  28  |  0.66    Ca    8.2<L>      23 Dec 2018 06:46  Ca    7.7<L>      22 Dec 2018 03:39  Ca    9.1      21 Dec 2018 08:42    TPro  6.6  /  Alb  3.6  /  TBili  0.3  /  DBili  x   /  AST  15  /  ALT  8<L>  /  AlkPhos  60  12-21    CAPILLARY BLOOD GLUCOSE          RADIOLOGY & ADDITIONAL TESTS:      Consultant(s):    Care Discussed with Consultants/Other Providers [ ] YES  [ ] NO

## 2018-12-23 NOTE — PROGRESS NOTE ADULT - SUBJECTIVE AND OBJECTIVE BOX
Patient is a 78y old  Female who presents with a chief complaint of Right distal femur fracture   Patient s/p ORIF right distal femur POD#1  Patient resting without complaints.  No chest pain, SOB, N/V.    T(C): 37 (12-23-18 @ 05:26), Max: 37.3 (12-23-18 @ 01:15)  HR: 78 (12-23-18 @ 05:26) (78 - 97)  BP: 121/66 (12-23-18 @ 05:26) (92/57 - 121/66)  RR: 18 (12-23-18 @ 05:26) (18 - 18)  SpO2: 96% (12-23-18 @ 05:26) (91% - 96%)    Exam:  Alert and Oriented, No Acute Distress  Cards: +S1/S2, RRR  Pulm: CTAB  Right Lower Extremity: in cylinder splint, moving digits  Left Calve Soft, Non-tender, SILT  +DP Pulse                        9.3    8.8   )-----------( 198      ( 22 Dec 2018 03:39 )             27.8    12-23  141  |  103  |  15  ----------------------------<  105<H>  3.7   |  29  |  0.69  Ca    8.2<L>      23 Dec 2018 06:46  TPro  6.6  /  Alb  3.6  /  TBili  0.3  /  DBili  x   /  AST  15  /  ALT  8<L>  /  AlkPhos  60  12-21

## 2018-12-24 LAB
ANION GAP SERPL CALC-SCNC: 11 MMOL/L — SIGNIFICANT CHANGE UP (ref 5–17)
BUN SERPL-MCNC: 10 MG/DL — SIGNIFICANT CHANGE UP (ref 7–23)
CALCIUM SERPL-MCNC: 8.4 MG/DL — SIGNIFICANT CHANGE UP (ref 8.4–10.5)
CHLORIDE SERPL-SCNC: 101 MMOL/L — SIGNIFICANT CHANGE UP (ref 96–108)
CO2 SERPL-SCNC: 29 MMOL/L — SIGNIFICANT CHANGE UP (ref 22–31)
CREAT SERPL-MCNC: 0.47 MG/DL — LOW (ref 0.5–1.3)
GLUCOSE SERPL-MCNC: 115 MG/DL — HIGH (ref 70–99)
HCT VFR BLD CALC: 26.5 % — LOW (ref 34.5–45)
HGB BLD-MCNC: 8.9 G/DL — LOW (ref 11.5–15.5)
MCHC RBC-ENTMCNC: 28.5 PG — SIGNIFICANT CHANGE UP (ref 27–34)
MCHC RBC-ENTMCNC: 33.5 GM/DL — SIGNIFICANT CHANGE UP (ref 32–36)
MCV RBC AUTO: 85 FL — SIGNIFICANT CHANGE UP (ref 80–100)
PLATELET # BLD AUTO: 197 K/UL — SIGNIFICANT CHANGE UP (ref 150–400)
POTASSIUM SERPL-MCNC: 3.3 MMOL/L — LOW (ref 3.5–5.3)
POTASSIUM SERPL-SCNC: 3.3 MMOL/L — LOW (ref 3.5–5.3)
RBC # BLD: 3.11 M/UL — LOW (ref 3.8–5.2)
RBC # FLD: 14.9 % — HIGH (ref 10.3–14.5)
SODIUM SERPL-SCNC: 141 MMOL/L — SIGNIFICANT CHANGE UP (ref 135–145)
WBC # BLD: 9.1 K/UL — SIGNIFICANT CHANGE UP (ref 3.8–10.5)
WBC # FLD AUTO: 9.1 K/UL — SIGNIFICANT CHANGE UP (ref 3.8–10.5)

## 2018-12-24 RX ORDER — HYDROMORPHONE HYDROCHLORIDE 2 MG/ML
4 INJECTION INTRAMUSCULAR; INTRAVENOUS; SUBCUTANEOUS EVERY 4 HOURS
Qty: 0 | Refills: 0 | Status: DISCONTINUED | OUTPATIENT
Start: 2018-12-24 | End: 2018-12-27

## 2018-12-24 RX ORDER — ACETAMINOPHEN 500 MG
1000 TABLET ORAL ONCE
Qty: 0 | Refills: 0 | Status: DISCONTINUED | OUTPATIENT
Start: 2018-12-24 | End: 2018-12-27

## 2018-12-24 RX ADMIN — FENTANYL CITRATE 1 PATCH: 50 INJECTION INTRAVENOUS at 06:26

## 2018-12-24 RX ADMIN — Medication 325 MILLIGRAM(S): at 11:45

## 2018-12-24 RX ADMIN — Medication 40 MILLIGRAM(S): at 05:55

## 2018-12-24 RX ADMIN — POLYETHYLENE GLYCOL 3350 17 GRAM(S): 17 POWDER, FOR SOLUTION ORAL at 21:52

## 2018-12-24 RX ADMIN — PANTOPRAZOLE SODIUM 40 MILLIGRAM(S): 20 TABLET, DELAYED RELEASE ORAL at 06:07

## 2018-12-24 RX ADMIN — Medication 1 MILLIGRAM(S): at 11:45

## 2018-12-24 RX ADMIN — Medication 325 MILLIGRAM(S): at 17:00

## 2018-12-24 RX ADMIN — POLYETHYLENE GLYCOL 3350 17 GRAM(S): 17 POWDER, FOR SOLUTION ORAL at 00:13

## 2018-12-24 RX ADMIN — Medication 100 MILLIGRAM(S): at 13:57

## 2018-12-24 RX ADMIN — RIVAROXABAN 10 MILLIGRAM(S): KIT at 11:45

## 2018-12-24 RX ADMIN — Medication 500 MILLIGRAM(S): at 05:55

## 2018-12-24 RX ADMIN — HYDROMORPHONE HYDROCHLORIDE 4 MILLIGRAM(S): 2 INJECTION INTRAMUSCULAR; INTRAVENOUS; SUBCUTANEOUS at 11:51

## 2018-12-24 RX ADMIN — HYDROMORPHONE HYDROCHLORIDE 4 MILLIGRAM(S): 2 INJECTION INTRAMUSCULAR; INTRAVENOUS; SUBCUTANEOUS at 12:21

## 2018-12-24 RX ADMIN — Medication 180 MILLIGRAM(S): at 05:55

## 2018-12-24 RX ADMIN — Medication 10 MILLIGRAM(S): at 11:45

## 2018-12-24 RX ADMIN — Medication 1 TABLET(S): at 11:45

## 2018-12-24 RX ADMIN — HYDROMORPHONE HYDROCHLORIDE 4 MILLIGRAM(S): 2 INJECTION INTRAMUSCULAR; INTRAVENOUS; SUBCUTANEOUS at 21:52

## 2018-12-24 RX ADMIN — DULOXETINE HYDROCHLORIDE 60 MILLIGRAM(S): 30 CAPSULE, DELAYED RELEASE ORAL at 11:45

## 2018-12-24 RX ADMIN — Medication 100 MILLIGRAM(S): at 00:12

## 2018-12-24 RX ADMIN — Medication 100 MILLIGRAM(S): at 21:52

## 2018-12-24 RX ADMIN — HYDROMORPHONE HYDROCHLORIDE 4 MILLIGRAM(S): 2 INJECTION INTRAMUSCULAR; INTRAVENOUS; SUBCUTANEOUS at 01:22

## 2018-12-24 RX ADMIN — Medication 500 MILLIGRAM(S): at 18:02

## 2018-12-24 RX ADMIN — HYDROMORPHONE HYDROCHLORIDE 4 MILLIGRAM(S): 2 INJECTION INTRAMUSCULAR; INTRAVENOUS; SUBCUTANEOUS at 02:30

## 2018-12-24 RX ADMIN — HYDROMORPHONE HYDROCHLORIDE 4 MILLIGRAM(S): 2 INJECTION INTRAMUSCULAR; INTRAVENOUS; SUBCUTANEOUS at 18:02

## 2018-12-24 RX ADMIN — Medication 325 MILLIGRAM(S): at 07:43

## 2018-12-24 RX ADMIN — Medication 100 MILLIGRAM(S): at 05:55

## 2018-12-24 RX ADMIN — HYDROMORPHONE HYDROCHLORIDE 4 MILLIGRAM(S): 2 INJECTION INTRAMUSCULAR; INTRAVENOUS; SUBCUTANEOUS at 18:32

## 2018-12-24 RX ADMIN — HYDROMORPHONE HYDROCHLORIDE 4 MILLIGRAM(S): 2 INJECTION INTRAMUSCULAR; INTRAVENOUS; SUBCUTANEOUS at 22:30

## 2018-12-24 RX ADMIN — FENTANYL CITRATE 1 PATCH: 50 INJECTION INTRAVENOUS at 19:00

## 2018-12-24 NOTE — PROGRESS NOTE ADULT - SUBJECTIVE AND OBJECTIVE BOX
78yFemale c/o R knee pain s/p mechanical fall in the bathroom. Patient denies head hit or LOC. Patient denies numbness or tingling in the RLE. Patient denies any other injuries. States she is minimally ambulatory around the house with a walker. When leaving the house, she uses a wheelchair. Patient also states she has two ulcers on her buttocks 2/2 remaining in seated positions for extended periods of time. She is s/p ORIF of right distal femur fracture. She needs care of her buttock decubiti as well.    MEDICATIONS  (STANDING):  acetaminophen  IVPB .. 1000 milliGRAM(s) IV Intermittent once  ascorbic acid 500 milliGRAM(s) Oral two times a day  baclofen 10 milliGRAM(s) Oral daily  diltiazem    milliGRAM(s) Oral daily  docusate sodium 100 milliGRAM(s) Oral three times a day  DULoxetine 60 milliGRAM(s) Oral daily  fentaNYL   Patch  50 MICROgram(s)/Hr 1 Patch Transdermal every 72 hours  ferrous    sulfate 325 milliGRAM(s) Oral three times a day with meals  folic acid 1 milliGRAM(s) Oral daily  furosemide    Tablet 40 milliGRAM(s) Oral daily  lactated ringers. 1000 milliLiter(s) (75 mL/Hr) IV Continuous <Continuous>  multivitamin 1 Tablet(s) Oral daily  pantoprazole    Tablet 40 milliGRAM(s) Oral before breakfast  polyethylene glycol 3350 17 Gram(s) Oral at bedtime  rivaroxaban 10 milliGRAM(s) Oral daily    MEDICATIONS  (PRN):  acetaminophen   Tablet .. 650 milliGRAM(s) Oral every 6 hours PRN Temp greater or equal to 38C (100.4F), Mild Pain (1 - 3)  bisacodyl Suppository 10 milliGRAM(s) Rectal daily PRN If no bowel movement  HYDROmorphone   Tablet 2 milliGRAM(s) Oral every 4 hours PRN Moderate Pain (4 - 6)  HYDROmorphone   Tablet 4 milliGRAM(s) Oral every 4 hours PRN Severe Pain (7 - 10)  HYDROmorphone  Injectable 0.5 milliGRAM(s) IV Push every 4 hours PRN breakthrough  magnesium hydroxide Suspension 30 milliLiter(s) Oral daily PRN Constipation  ondansetron Injectable 4 milliGRAM(s) IV Push every 6 hours PRN Nausea and/or Vomiting  traMADol 50 milliGRAM(s) Oral every 6 hours PRN Mild Pain (1 - 3)          VITALS:   T(C): 36.6 (12-24-18 @ 21:19), Max: 37.4 (12-24-18 @ 05:30)  HR: 80 (12-24-18 @ 21:19) (67 - 89)  BP: 131/71 (12-24-18 @ 21:19) (120/73 - 161/77)  RR: 16 (12-24-18 @ 21:19) (16 - 18)  SpO2: 94% (12-24-18 @ 21:19) (93% - 96%)  Wt(kg): --    PHYSICAL EXAM:  GENERAL: NAD, well nourished and conversant  HEAD:  Atraumatic  EYES: EOM, PERRLA, conjunctiva pink and sclera white  ENT: No tonsillar erythema, exudates, or enlargement, moist mucous membranes, good dentition, no lesions  NECK: Supple, No JVD, normal thyroid, carotids with normal upstrokes and no bruits  CHEST/LUNG: Clear to auscultation bilaterally, No rales, rhonchi, wheezing, or rubs  HEART: Regular rate and rhythm, No murmurs, rubs, or gallops  ABDOMEN: Soft, nondistended, no masses, guarding, tenderness or rebound, bowel sounds present  EXTREMITIES:  2+ Peripheral Pulses, No clubbing, cyanosis, or edema.  ORIF right distal femur fracture  SKIN: No rashes or lesions  NERVOUS SYSTEM:  Alert & Oriented X3, normal cognitive function. Motor Strength 5/5 right upper and right lower.  5/5 left upper and left lower extremities, DTRs 2+ intact and symmetric    LABS:        CBC Full  -  ( 24 Dec 2018 07:14 )  WBC Count : 9.1 K/uL  Hemoglobin : 8.9 g/dL  Hematocrit : 26.5 %  Platelet Count - Automated : 197 K/uL  Mean Cell Volume : 85.0 fl  Mean Cell Hemoglobin : 28.5 pg  Mean Cell Hemoglobin Concentration : 33.5 gm/dL  Auto Neutrophil # : x  Auto Lymphocyte # : x  Auto Monocyte # : x  Auto Eosinophil # : x  Auto Basophil # : x  Auto Neutrophil % : x  Auto Lymphocyte % : x  Auto Monocyte % : x  Auto Eosinophil % : x  Auto Basophil % : x    12-24    141  |  101  |  10  ----------------------------<  115<H>  3.3<L>   |  29  |  0.47<L>    Ca    8.4      24 Dec 2018 07:13            CAPILLARY BLOOD GLUCOSE          RADIOLOGY & ADDITIONAL TESTS:

## 2018-12-24 NOTE — PROGRESS NOTE ADULT - SUBJECTIVE AND OBJECTIVE BOX
CARDIOLOGY     PROGRESS  NOTE   ________________________________________________    CHIEF COMPLAINT:Patient is a 78y old  Female who presents with a chief complaint of Right distal femur fracture (24 Dec 2018 07:14)    	  REVIEW OF SYSTEMS:  CONSTITUTIONAL: No fever, weight loss, or fatigue  EYES: No eye pain, visual disturbances, or discharge  ENT:  No difficulty hearing, tinnitus, vertigo; No sinus or throat pain  NECK: No pain or stiffness  RESPIRATORY: No cough, wheezing, chills or hemoptysis; No Shortness of Breath  CARDIOVASCULAR: No chest pain, palpitations, passing out, dizziness, or leg swelling  GASTROINTESTINAL: No abdominal or epigastric pain. No nausea, vomiting, or hematemesis; No diarrhea or constipation. No melena or hematochezia.  GENITOURINARY: No dysuria, frequency, hematuria, or incontinence  NEUROLOGICAL: No headaches, memory loss, loss of strength, numbness, or tremors  SKIN: No itching, burning, rashes, or lesions   LYMPH Nodes: No enlarged glands  ENDOCRINE: No heat or cold intolerance; No hair loss  MUSCULOSKELETAL: No joint pain or swelling; No muscle, back, or extremity pain  PSYCHIATRIC: No depression, anxiety, mood swings, or difficulty sleeping  HEME/LYMPH: No easy bruising, or bleeding gums  ALLERGY AND IMMUNOLOGIC: No hives or eczema	    [ ] All others negative	  [ ] Unable to obtain    PHYSICAL EXAM:  T(C): 36.8 (12-24-18 @ 08:15), Max: 37.4 (12-23-18 @ 22:06)  HR: 71 (12-24-18 @ 08:15) (71 - 89)  BP: 120/73 (12-24-18 @ 08:15) (106/62 - 161/77)  RR: 18 (12-24-18 @ 08:15) (18 - 18)  SpO2: 93% (12-24-18 @ 08:15) (93% - 97%)  Wt(kg): --  I&O's Summary    23 Dec 2018 07:01  -  24 Dec 2018 07:00  --------------------------------------------------------  IN: 1140 mL / OUT: 3500 mL / NET: -2360 mL    24 Dec 2018 07:01  -  24 Dec 2018 09:31  --------------------------------------------------------  IN: 450 mL / OUT: 500 mL / NET: -50 mL        Appearance: Normal	  HEENT:   Normal oral mucosa, PERRL, EOMI	  Lymphatic: No lymphadenopathy  Cardiovascular: Normal S1 S2, No JVD, No murmurs, No edema  Respiratory: Lungs clear to auscultation	  Psychiatry: A & O x 3, Mood & affect appropriate  Gastrointestinal:  Soft, Non-tender, + BS	  Skin: No rashes, No ecchymoses, No cyanosis	  Neurologic: Non-focal  Extremities: Normal range of motion, No clubbing, cyanosis or edema  Vascular: Peripheral pulses palpable 2+ bilaterally    MEDICATIONS  (STANDING):  acetaminophen  IVPB .. 1000 milliGRAM(s) IV Intermittent once  ascorbic acid 500 milliGRAM(s) Oral two times a day  baclofen 10 milliGRAM(s) Oral daily  diltiazem    milliGRAM(s) Oral daily  docusate sodium 100 milliGRAM(s) Oral three times a day  DULoxetine 60 milliGRAM(s) Oral daily  fentaNYL   Patch  50 MICROgram(s)/Hr 1 Patch Transdermal every 72 hours  ferrous    sulfate 325 milliGRAM(s) Oral three times a day with meals  folic acid 1 milliGRAM(s) Oral daily  furosemide    Tablet 40 milliGRAM(s) Oral daily  lactated ringers. 1000 milliLiter(s) (75 mL/Hr) IV Continuous <Continuous>  multivitamin 1 Tablet(s) Oral daily  pantoprazole    Tablet 40 milliGRAM(s) Oral before breakfast  polyethylene glycol 3350 17 Gram(s) Oral at bedtime  rivaroxaban 10 milliGRAM(s) Oral daily      TELEMETRY: 	    ECG:  	  RADIOLOGY:  OTHER: 	  	  LABS:	 	    CARDIAC MARKERS:                                8.9    9.1   )-----------( 197      ( 24 Dec 2018 07:14 )             26.5     12-24    141  |  101  |  10  ----------------------------<  115<H>  3.3<L>   |  29  |  0.47<L>    Ca    8.4      24 Dec 2018 07:13      proBNP:   Lipid Profile:   HgA1c:   TSH:         Assessment and plan  ---------------------------  doing better  continue current meds  physical therapy

## 2018-12-25 LAB
ANION GAP SERPL CALC-SCNC: 11 MMOL/L — SIGNIFICANT CHANGE UP (ref 5–17)
BLD GP AB SCN SERPL QL: NEGATIVE — SIGNIFICANT CHANGE UP
BUN SERPL-MCNC: 11 MG/DL — SIGNIFICANT CHANGE UP (ref 7–23)
CALCIUM SERPL-MCNC: 8.6 MG/DL — SIGNIFICANT CHANGE UP (ref 8.4–10.5)
CHLORIDE SERPL-SCNC: 99 MMOL/L — SIGNIFICANT CHANGE UP (ref 96–108)
CO2 SERPL-SCNC: 29 MMOL/L — SIGNIFICANT CHANGE UP (ref 22–31)
CREAT SERPL-MCNC: 0.44 MG/DL — LOW (ref 0.5–1.3)
GLUCOSE SERPL-MCNC: 114 MG/DL — HIGH (ref 70–99)
HCT VFR BLD CALC: 25.9 % — LOW (ref 34.5–45)
HGB BLD-MCNC: 8.4 G/DL — LOW (ref 11.5–15.5)
MCHC RBC-ENTMCNC: 27.3 PG — SIGNIFICANT CHANGE UP (ref 27–34)
MCHC RBC-ENTMCNC: 32.4 GM/DL — SIGNIFICANT CHANGE UP (ref 32–36)
MCV RBC AUTO: 84.1 FL — SIGNIFICANT CHANGE UP (ref 80–100)
PLATELET # BLD AUTO: 251 K/UL — SIGNIFICANT CHANGE UP (ref 150–400)
POTASSIUM SERPL-MCNC: 3.3 MMOL/L — LOW (ref 3.5–5.3)
POTASSIUM SERPL-SCNC: 3.3 MMOL/L — LOW (ref 3.5–5.3)
RBC # BLD: 3.08 M/UL — LOW (ref 3.8–5.2)
RBC # FLD: 16.2 % — HIGH (ref 10.3–14.5)
RH IG SCN BLD-IMP: POSITIVE — SIGNIFICANT CHANGE UP
SODIUM SERPL-SCNC: 139 MMOL/L — SIGNIFICANT CHANGE UP (ref 135–145)
WBC # BLD: 8.73 K/UL — SIGNIFICANT CHANGE UP (ref 3.8–10.5)
WBC # FLD AUTO: 8.73 K/UL — SIGNIFICANT CHANGE UP (ref 3.8–10.5)

## 2018-12-25 RX ADMIN — HYDROMORPHONE HYDROCHLORIDE 4 MILLIGRAM(S): 2 INJECTION INTRAMUSCULAR; INTRAVENOUS; SUBCUTANEOUS at 07:30

## 2018-12-25 RX ADMIN — FENTANYL CITRATE 1 PATCH: 50 INJECTION INTRAVENOUS at 11:14

## 2018-12-25 RX ADMIN — HYDROMORPHONE HYDROCHLORIDE 4 MILLIGRAM(S): 2 INJECTION INTRAMUSCULAR; INTRAVENOUS; SUBCUTANEOUS at 12:36

## 2018-12-25 RX ADMIN — RIVAROXABAN 10 MILLIGRAM(S): KIT at 12:36

## 2018-12-25 RX ADMIN — Medication 100 MILLIGRAM(S): at 21:21

## 2018-12-25 RX ADMIN — POLYETHYLENE GLYCOL 3350 17 GRAM(S): 17 POWDER, FOR SOLUTION ORAL at 21:21

## 2018-12-25 RX ADMIN — Medication 325 MILLIGRAM(S): at 12:36

## 2018-12-25 RX ADMIN — Medication 500 MILLIGRAM(S): at 17:15

## 2018-12-25 RX ADMIN — PANTOPRAZOLE SODIUM 40 MILLIGRAM(S): 20 TABLET, DELAYED RELEASE ORAL at 06:54

## 2018-12-25 RX ADMIN — Medication 325 MILLIGRAM(S): at 17:15

## 2018-12-25 RX ADMIN — Medication 10 MILLIGRAM(S): at 12:36

## 2018-12-25 RX ADMIN — HYDROMORPHONE HYDROCHLORIDE 4 MILLIGRAM(S): 2 INJECTION INTRAMUSCULAR; INTRAVENOUS; SUBCUTANEOUS at 06:54

## 2018-12-25 RX ADMIN — Medication 100 MILLIGRAM(S): at 06:54

## 2018-12-25 RX ADMIN — FENTANYL CITRATE 1 PATCH: 50 INJECTION INTRAVENOUS at 19:25

## 2018-12-25 RX ADMIN — FENTANYL CITRATE 1 PATCH: 50 INJECTION INTRAVENOUS at 11:10

## 2018-12-25 RX ADMIN — Medication 500 MILLIGRAM(S): at 06:54

## 2018-12-25 RX ADMIN — Medication 1 MILLIGRAM(S): at 12:36

## 2018-12-25 RX ADMIN — Medication 40 MILLIGRAM(S): at 06:54

## 2018-12-25 RX ADMIN — Medication 1 TABLET(S): at 12:36

## 2018-12-25 RX ADMIN — DULOXETINE HYDROCHLORIDE 60 MILLIGRAM(S): 30 CAPSULE, DELAYED RELEASE ORAL at 12:36

## 2018-12-25 RX ADMIN — Medication 100 MILLIGRAM(S): at 13:45

## 2018-12-25 RX ADMIN — HYDROMORPHONE HYDROCHLORIDE 4 MILLIGRAM(S): 2 INJECTION INTRAMUSCULAR; INTRAVENOUS; SUBCUTANEOUS at 17:15

## 2018-12-25 RX ADMIN — FENTANYL CITRATE 1 PATCH: 50 INJECTION INTRAVENOUS at 07:39

## 2018-12-25 RX ADMIN — Medication 325 MILLIGRAM(S): at 09:24

## 2018-12-25 RX ADMIN — HYDROMORPHONE HYDROCHLORIDE 4 MILLIGRAM(S): 2 INJECTION INTRAMUSCULAR; INTRAVENOUS; SUBCUTANEOUS at 13:10

## 2018-12-25 RX ADMIN — Medication 180 MILLIGRAM(S): at 06:54

## 2018-12-25 NOTE — PROGRESS NOTE ADULT - SUBJECTIVE AND OBJECTIVE BOX
Patient is a 78y old  Female who presents with a chief complaint of Right distal femur fracture   Patient s/p ORIF right distal femur POD#3  Patient resting without complaints.  No chest pain, SOB, N/V.    T(C): 36.7 (12-25-18 @ 06:50), Max: 36.9 (12-24-18 @ 23:54)  HR: 71 (12-25-18 @ 06:50) (67 - 80)  BP: 134/74 (12-25-18 @ 06:50) (126/70 - 153/71)  RR: 18 (12-25-18 @ 06:50) (16 - 18)  SpO2: 94% (12-25-18 @ 06:50) (93% - 95%)    Exam:  Alert and Oriented, No Acute Distress  Cards: +S1/S2, RRR  Pulm: CTAB  Lower Extremities: Right L/E in cylinder splint, moving digits  Left L/ECalve Soft, Non-tender   +PF/DF/EHL/FHL  SILT  +DP Pulse                        8.9    9.1   )-----------( 197      ( 24 Dec 2018 07:14 )             26.5    12-25  139  |  99  |  11  ----------------------------<  114<H>  3.3<L>   |  29  |  0.44<L>  Ca    8.6      25 Dec 2018 07:07

## 2018-12-25 NOTE — PROGRESS NOTE ADULT - SUBJECTIVE AND OBJECTIVE BOX
78yFemale c/o R knee pain s/p mechanical fall in the bathroom. Patient denies head hit or LOC. Patient denies numbness or tingling in the RLE. Patient denies any other injuries. States she is minimally ambulatory around the house with a walker. When leaving the house, she uses a wheelchair. Patient also states she has two ulcers on her buttocks 2/2 remaining in seated positions for extended periods of time. She is s/p ORIF of right distal femur fracture. She needs care of her buttock decubiti as well.    MEDICATIONS  (STANDING):  acetaminophen  IVPB .. 1000 milliGRAM(s) IV Intermittent once  ascorbic acid 500 milliGRAM(s) Oral two times a day  baclofen 10 milliGRAM(s) Oral daily  diltiazem    milliGRAM(s) Oral daily  docusate sodium 100 milliGRAM(s) Oral three times a day  DULoxetine 60 milliGRAM(s) Oral daily  fentaNYL   Patch  50 MICROgram(s)/Hr 1 Patch Transdermal every 72 hours  ferrous    sulfate 325 milliGRAM(s) Oral three times a day with meals  folic acid 1 milliGRAM(s) Oral daily  furosemide    Tablet 40 milliGRAM(s) Oral daily  lactated ringers. 1000 milliLiter(s) (75 mL/Hr) IV Continuous <Continuous>  multivitamin 1 Tablet(s) Oral daily  pantoprazole    Tablet 40 milliGRAM(s) Oral before breakfast  polyethylene glycol 3350 17 Gram(s) Oral at bedtime  rivaroxaban 10 milliGRAM(s) Oral daily    MEDICATIONS  (PRN):  acetaminophen   Tablet .. 650 milliGRAM(s) Oral every 6 hours PRN Temp greater or equal to 38C (100.4F), Mild Pain (1 - 3)  bisacodyl Suppository 10 milliGRAM(s) Rectal daily PRN If no bowel movement  HYDROmorphone   Tablet 2 milliGRAM(s) Oral every 4 hours PRN Moderate Pain (4 - 6)  HYDROmorphone   Tablet 4 milliGRAM(s) Oral every 4 hours PRN Severe Pain (7 - 10)  HYDROmorphone  Injectable 0.5 milliGRAM(s) IV Push every 4 hours PRN breakthrough  magnesium hydroxide Suspension 30 milliLiter(s) Oral daily PRN Constipation  ondansetron Injectable 4 milliGRAM(s) IV Push every 6 hours PRN Nausea and/or Vomiting  traMADol 50 milliGRAM(s) Oral every 6 hours PRN Mild Pain (1 - 3)    Vital Signs Last 24 Hrs  T(C): 36.7 (26 Dec 2018 00:18), Max: 37 (25 Dec 2018 14:31)  T(F): 98.1 (26 Dec 2018 00:18), Max: 98.6 (25 Dec 2018 14:31)  HR: 783 (26 Dec 2018 00:18) (68 - 783)  BP: 130/72 (26 Dec 2018 00:18) (117/65 - 134/74)  BP(mean): --  RR: 156 (26 Dec 2018 00:18) (16 - 156)  SpO2: 93% (26 Dec 2018 00:18) (93% - 96%)  PHYSICAL EXAM:  GENERAL: NAD, well nourished and conversant  HEAD:  Atraumatic  EYES: EOM, PERRLA, conjunctiva pink and sclera white  ENT: No tonsillar erythema, exudates, or enlargement, moist mucous membranes, good dentition, no lesions  NECK: Supple, No JVD, normal thyroid, carotids with normal upstrokes and no bruits  CHEST/LUNG: Clear to auscultation bilaterally, No rales, rhonchi, wheezing, or rubs  HEART: Regular rate and rhythm, No murmurs, rubs, or gallops  ABDOMEN: Soft, nondistended, no masses, guarding, tenderness or rebound, bowel sounds present  EXTREMITIES:  2+ Peripheral Pulses, No clubbing, cyanosis, or edema.  ORIF right distal femur fracture  SKIN: No rashes or lesions  NERVOUS SYSTEM:  Alert & Oriented X3, normal cognitive function. Motor Strength 5/5 right upper and right lower.  5/5 left upper and left lower extremities, DTRs 2+ intact and symmetric    LABS:              CBC Full  -  ( 25 Dec 2018 08:02 )  WBC Count : 8.73 K/uL  Hemoglobin : 8.4 g/dL  Hematocrit : 25.9 %  Platelet Count - Automated : 251 K/uL  Mean Cell Volume : 84.1 fl  Mean Cell Hemoglobin : 27.3 pg  Mean Cell Hemoglobin Concentration : 32.4 gm/dL  Auto Neutrophil # : x  Auto Lymphocyte # : x  Auto Monocyte # : x  Auto Eosinophil # : x  Auto Basophil # : x  Auto Neutrophil % : x  Auto Lymphocyte % : x  Auto Monocyte % : x  Auto Eosinophil % : x  Auto Basophil % : x    12-25    139  |  99  |  11  ----------------------------<  114<H>  3.3<L>   |  29  |  0.44<L>    Ca    8.6      25 Dec 2018 07:07 78yFemale c/o R knee pain s/p mechanical fall in the bathroom. Patient denies head hit or LOC. Patient denies numbness or tingling in the RLE. Patient denies any other injuries. States she is minimally ambulatory around the house with a walker. When leaving the house, she uses a wheelchair. Patient also states she has two ulcers on her buttocks 2/2 remaining in seated positions for extended periods of time. She is s/p ORIF of right distal femur fracture with an open reduction and internal fixation (ORIF) of fracture of distal femur on 12/22/2018.  She needs care of her buttock decubiti, in addition to physical therapy for a periprosthetic fracture with ORIF    MEDICATIONS  (STANDING):  acetaminophen  IVPB .. 1000 milliGRAM(s) IV Intermittent once  ascorbic acid 500 milliGRAM(s) Oral two times a day  baclofen 10 milliGRAM(s) Oral daily  diltiazem    milliGRAM(s) Oral daily  docusate sodium 100 milliGRAM(s) Oral three times a day  DULoxetine 60 milliGRAM(s) Oral daily  fentaNYL   Patch  50 MICROgram(s)/Hr 1 Patch Transdermal every 72 hours  ferrous    sulfate 325 milliGRAM(s) Oral three times a day with meals  folic acid 1 milliGRAM(s) Oral daily  furosemide    Tablet 40 milliGRAM(s) Oral daily  lactated ringers. 1000 milliLiter(s) (75 mL/Hr) IV Continuous <Continuous>  multivitamin 1 Tablet(s) Oral daily  pantoprazole    Tablet 40 milliGRAM(s) Oral before breakfast  polyethylene glycol 3350 17 Gram(s) Oral at bedtime  rivaroxaban 10 milliGRAM(s) Oral daily    MEDICATIONS  (PRN):  acetaminophen   Tablet .. 650 milliGRAM(s) Oral every 6 hours PRN Temp greater or equal to 38C (100.4F), Mild Pain (1 - 3)  bisacodyl Suppository 10 milliGRAM(s) Rectal daily PRN If no bowel movement  HYDROmorphone   Tablet 2 milliGRAM(s) Oral every 4 hours PRN Moderate Pain (4 - 6)  HYDROmorphone   Tablet 4 milliGRAM(s) Oral every 4 hours PRN Severe Pain (7 - 10)  HYDROmorphone  Injectable 0.5 milliGRAM(s) IV Push every 4 hours PRN breakthrough  magnesium hydroxide Suspension 30 milliLiter(s) Oral daily PRN Constipation  ondansetron Injectable 4 milliGRAM(s) IV Push every 6 hours PRN Nausea and/or Vomiting  traMADol 50 milliGRAM(s) Oral every 6 hours PRN Mild Pain (1 - 3)    LABS:    CBC Full  -  ( 25 Dec 2018 08:02 )  WBC Count : 8.73 K/uL  Hemoglobin : 8.4 g/dL  Hematocrit : 25.9 %  Platelet Count - Automated : 251 K/uL  Mean Cell Volume : 84.1 fl  Mean Cell Hemoglobin : 27.3 pg  Mean Cell Hemoglobin Concentration : 32.4 gm/dL  Auto Neutrophil # : x  Auto Lymphocyte # : x  Auto Monocyte # : x  Auto Eosinophil # : x  Auto Basophil # : x  Auto Neutrophil % : x  Auto Lymphocyte % : x  Auto Monocyte % : x  Auto Eosinophil % : x  Auto Basophil % : x    12-25    139  |  99  |  11  ----------------------------<  114<H>  3.3<L>   |  29  |  0.44<L>    Ca    8.6      25 Dec 2018 07:07 78yFemale c/o R knee pain s/p mechanical fall in the bathroom. Patient denies head hit or LOC. Patient denies numbness or tingling in the RLE. Patient denies any other injuries. States she is minimally ambulatory around the house with a walker. When leaving the house, she uses a wheelchair. Patient also states she has two ulcers on her buttocks 2/2 remaining in seated positions for extended periods of time. She is s/p ORIF of right distal femur fracture with an open reduction and internal fixation (ORIF) of fracture of distal femur on 12/22/2018.  She needs care of her buttock decubiti, in addition to physical therapy for a periprosthetic fracture with ORIF    MEDICATIONS  (STANDING):  acetaminophen  IVPB .. 1000 milliGRAM(s) IV Intermittent once  ascorbic acid 500 milliGRAM(s) Oral two times a day  baclofen 10 milliGRAM(s) Oral daily  diltiazem    milliGRAM(s) Oral daily  docusate sodium 100 milliGRAM(s) Oral three times a day  DULoxetine 60 milliGRAM(s) Oral daily  fentaNYL   Patch  50 MICROgram(s)/Hr 1 Patch Transdermal every 72 hours  ferrous    sulfate 325 milliGRAM(s) Oral three times a day with meals  folic acid 1 milliGRAM(s) Oral daily  furosemide    Tablet 40 milliGRAM(s) Oral daily  lactated ringers. 1000 milliLiter(s) (75 mL/Hr) IV Continuous <Continuous>  multivitamin 1 Tablet(s) Oral daily  pantoprazole    Tablet 40 milliGRAM(s) Oral before breakfast  polyethylene glycol 3350 17 Gram(s) Oral at bedtime  rivaroxaban 10 milliGRAM(s) Oral daily    MEDICATIONS  (PRN):  acetaminophen   Tablet .. 650 milliGRAM(s) Oral every 6 hours PRN Temp greater or equal to 38C (100.4F), Mild Pain (1 - 3)  bisacodyl Suppository 10 milliGRAM(s) Rectal daily PRN If no bowel movement  HYDROmorphone   Tablet 2 milliGRAM(s) Oral every 4 hours PRN Moderate Pain (4 - 6)  HYDROmorphone   Tablet 4 milliGRAM(s) Oral every 4 hours PRN Severe Pain (7 - 10)  HYDROmorphone  Injectable 0.5 milliGRAM(s) IV Push every 4 hours PRN breakthrough  magnesium hydroxide Suspension 30 milliLiter(s) Oral daily PRN Constipation  ondansetron Injectable 4 milliGRAM(s) IV Push every 6 hours PRN Nausea and/or Vomiting  traMADol 50 milliGRAM(s) Oral every 6 hours PRN Mild Pain (1 - 3)    Vital Signs Last 24 Hrs  T(C): 36.6 (25 Dec 2018 05:23), Max: 37 (25 Dec 2018 14:31)  T(F): 97.8 (25 Dec 2018 05:23), Max: 98.6 (25 Dec 2018 14:31)  HR: 72 (25 Dec 2018 05:23) (68 - 83)  BP: 112/72 (25 Dec 2018 05:23) (112/72 - 134/74)  BP(mean): --  RR: 16 (25 Dec 2018 05:23) (16 - 18)  SpO2: 96% (25 Dec 2018 05:23) (93% - 96%)    PHYSICAL EXAM:  GENERAL: NAD, well nourished and conversant  HEAD:  Atraumatic  EYES: EOM, PERRLA, conjunctiva pink and sclera white  ENT: No tonsillar erythema, exudates, or enlargement, moist mucous membranes, good dentition, no lesions  NECK: Supple, No JVD, normal thyroid, carotids with normal upstrokes and no bruits  CHEST/LUNG: Clear to auscultation bilaterally, No rales, rhonchi, wheezing, or rubs  HEART: Regular rate and rhythm, No murmurs, rubs, or gallops  ABDOMEN: Soft, nondistended, no masses, guarding, tenderness or rebound, bowel sounds present  EXTREMITIES:  2+ Peripheral Pulses, No clubbing, cyanosis, or edema.    (+) right distal femur fracture. 4+ right leg swelling and 3+ tenderness to touch. No erythema or incisional drainage.    LYMPH: No lymphadenopathy noted  SKIN: No rashes or lesions decubitus ulcers on buttocks  NERVOUS SYSTEM:  Alert & Oriented X3, normal cognitive function. Motor Strength 5/5 right upper and right lower.  5/5 left upper and left lower extremities, DTRs 2+ intact and symmetric      LABS:    CBC Full  -  ( 25 Dec 2018 08:02 )  WBC Count : 8.73 K/uL  Hemoglobin : 8.4 g/dL  Hematocrit : 25.9 %  Platelet Count - Automated : 251 K/uL  Mean Cell Volume : 84.1 fl  Mean Cell Hemoglobin : 27.3 pg  Mean Cell Hemoglobin Concentration : 32.4 gm/dL  Auto Neutrophil # : x  Auto Lymphocyte # : x  Auto Monocyte # : x  Auto Eosinophil # : x  Auto Basophil # : x  Auto Neutrophil % : x  Auto Lymphocyte % : x  Auto Monocyte % : x  Auto Eosinophil % : x  Auto Basophil % : x    12-25    139  |  99  |  11  ----------------------------<  114<H>  3.3<L>   |  29  |  0.44<L>    Ca    8.6      25 Dec 2018 07:07

## 2018-12-25 NOTE — PROGRESS NOTE ADULT - SUBJECTIVE AND OBJECTIVE BOX
CARDIOLOGY     PROGRESS  NOTE   ________________________________________________    CHIEF COMPLAINT:Patient is a 78y old  Female who presents with a chief complaint of Right distal femur fracture (25 Dec 2018 08:26)    	  REVIEW OF SYSTEMS:  CONSTITUTIONAL: No fever, weight loss, or fatigue  EYES: No eye pain, visual disturbances, or discharge  ENT:  No difficulty hearing, tinnitus, vertigo; No sinus or throat pain  NECK: No pain or stiffness  RESPIRATORY: No cough, wheezing, chills or hemoptysis; No Shortness of Breath  CARDIOVASCULAR: No chest pain, palpitations, passing out, dizziness, or leg swelling  GASTROINTESTINAL: No abdominal or epigastric pain. No nausea, vomiting, or hematemesis; No diarrhea or constipation. No melena or hematochezia.  GENITOURINARY: No dysuria, frequency, hematuria, or incontinence  NEUROLOGICAL: No headaches, memory loss, loss of strength, numbness, or tremors  SKIN: No itching, burning, rashes, or lesions   LYMPH Nodes: No enlarged glands  ENDOCRINE: No heat or cold intolerance; No hair loss  MUSCULOSKELETAL: No joint pain or swelling; No muscle, back, or extremity pain  PSYCHIATRIC: No depression, anxiety, mood swings, or difficulty sleeping  HEME/LYMPH: No easy bruising, or bleeding gums  ALLERGY AND IMMUNOLOGIC: No hives or eczema	    [ ] All others negative	  [ ] Unable to obtain    PHYSICAL EXAM:  T(C): 36.7 (12-25-18 @ 06:50), Max: 36.9 (12-24-18 @ 23:54)  HR: 71 (12-25-18 @ 06:50) (67 - 80)  BP: 134/74 (12-25-18 @ 06:50) (126/70 - 153/71)  RR: 18 (12-25-18 @ 06:50) (16 - 18)  SpO2: 94% (12-25-18 @ 06:50) (93% - 95%)  Wt(kg): --  I&O's Summary    24 Dec 2018 07:01  -  25 Dec 2018 07:00  --------------------------------------------------------  IN: 690 mL / OUT: 2950 mL / NET: -2260 mL        Appearance: Normal	  HEENT:   Normal oral mucosa, PERRL, EOMI	  Lymphatic: No lymphadenopathy  Cardiovascular: Normal S1 S2, No JVD, No murmurs, No edema  Respiratory: Lungs clear to auscultation	  Psychiatry: A & O x 3, Mood & affect appropriate  Gastrointestinal:  Soft, Non-tender, + BS	  Skin: No rashes, No ecchymoses, No cyanosis	  Neurologic: Non-focal  Extremities: Normal range of motion, No clubbing, cyanosis or edema  Vascular: Peripheral pulses palpable 2+ bilaterally    MEDICATIONS  (STANDING):  acetaminophen  IVPB .. 1000 milliGRAM(s) IV Intermittent once  ascorbic acid 500 milliGRAM(s) Oral two times a day  baclofen 10 milliGRAM(s) Oral daily  diltiazem    milliGRAM(s) Oral daily  docusate sodium 100 milliGRAM(s) Oral three times a day  DULoxetine 60 milliGRAM(s) Oral daily  fentaNYL   Patch  50 MICROgram(s)/Hr 1 Patch Transdermal every 72 hours  ferrous    sulfate 325 milliGRAM(s) Oral three times a day with meals  folic acid 1 milliGRAM(s) Oral daily  furosemide    Tablet 40 milliGRAM(s) Oral daily  lactated ringers. 1000 milliLiter(s) (75 mL/Hr) IV Continuous <Continuous>  multivitamin 1 Tablet(s) Oral daily  pantoprazole    Tablet 40 milliGRAM(s) Oral before breakfast  polyethylene glycol 3350 17 Gram(s) Oral at bedtime  rivaroxaban 10 milliGRAM(s) Oral daily      TELEMETRY: 	    ECG:  	  RADIOLOGY:  OTHER: 	  	  LABS:	 	    CARDIAC MARKERS:                                8.4    8.73  )-----------( 251      ( 25 Dec 2018 08:02 )             25.9     12-25    139  |  99  |  11  ----------------------------<  114<H>  3.3<L>   |  29  |  0.44<L>    Ca    8.6      25 Dec 2018 07:07      proBNP:   Lipid Profile:   HgA1c:   TSH:         Assessment and plan  ---------------------------  doing well  continue current meds  dvt prophylaxis  keep hgb >8

## 2018-12-26 ENCOUNTER — TRANSCRIPTION ENCOUNTER (OUTPATIENT)
Age: 78
End: 2018-12-26

## 2018-12-26 RX ORDER — RIVAROXABAN 15 MG-20MG
1 KIT ORAL
Qty: 0 | Refills: 0 | COMMUNITY
Start: 2018-12-26

## 2018-12-26 RX ORDER — ACETAMINOPHEN 500 MG
2 TABLET ORAL
Qty: 0 | Refills: 0 | COMMUNITY
Start: 2018-12-26

## 2018-12-26 RX ORDER — DOCUSATE SODIUM 100 MG
1 CAPSULE ORAL
Qty: 0 | Refills: 0 | DISCHARGE
Start: 2018-12-26

## 2018-12-26 RX ORDER — HYDROMORPHONE HYDROCHLORIDE 2 MG/ML
1 INJECTION INTRAMUSCULAR; INTRAVENOUS; SUBCUTANEOUS
Qty: 0 | Refills: 0 | COMMUNITY
Start: 2018-12-26

## 2018-12-26 RX ORDER — TRAMADOL HYDROCHLORIDE 50 MG/1
1 TABLET ORAL
Qty: 0 | Refills: 0 | DISCHARGE
Start: 2018-12-26

## 2018-12-26 RX ORDER — FENTANYL CITRATE 50 UG/ML
1 INJECTION INTRAVENOUS
Qty: 0 | Refills: 0 | COMMUNITY
Start: 2018-12-26

## 2018-12-26 RX ORDER — POLYETHYLENE GLYCOL 3350 17 G/17G
17 POWDER, FOR SOLUTION ORAL
Qty: 0 | Refills: 0 | DISCHARGE
Start: 2018-12-26

## 2018-12-26 RX ADMIN — Medication 40 MILLIGRAM(S): at 05:21

## 2018-12-26 RX ADMIN — MAGNESIUM HYDROXIDE 30 MILLILITER(S): 400 TABLET, CHEWABLE ORAL at 18:02

## 2018-12-26 RX ADMIN — Medication 325 MILLIGRAM(S): at 08:46

## 2018-12-26 RX ADMIN — Medication 100 MILLIGRAM(S): at 13:12

## 2018-12-26 RX ADMIN — DULOXETINE HYDROCHLORIDE 60 MILLIGRAM(S): 30 CAPSULE, DELAYED RELEASE ORAL at 12:08

## 2018-12-26 RX ADMIN — Medication 325 MILLIGRAM(S): at 12:07

## 2018-12-26 RX ADMIN — POLYETHYLENE GLYCOL 3350 17 GRAM(S): 17 POWDER, FOR SOLUTION ORAL at 21:43

## 2018-12-26 RX ADMIN — Medication 500 MILLIGRAM(S): at 18:03

## 2018-12-26 RX ADMIN — HYDROMORPHONE HYDROCHLORIDE 4 MILLIGRAM(S): 2 INJECTION INTRAMUSCULAR; INTRAVENOUS; SUBCUTANEOUS at 09:15

## 2018-12-26 RX ADMIN — Medication 10 MILLIGRAM(S): at 12:08

## 2018-12-26 RX ADMIN — HYDROMORPHONE HYDROCHLORIDE 4 MILLIGRAM(S): 2 INJECTION INTRAMUSCULAR; INTRAVENOUS; SUBCUTANEOUS at 08:46

## 2018-12-26 RX ADMIN — PANTOPRAZOLE SODIUM 40 MILLIGRAM(S): 20 TABLET, DELAYED RELEASE ORAL at 05:22

## 2018-12-26 RX ADMIN — HYDROMORPHONE HYDROCHLORIDE 4 MILLIGRAM(S): 2 INJECTION INTRAMUSCULAR; INTRAVENOUS; SUBCUTANEOUS at 04:00

## 2018-12-26 RX ADMIN — Medication 100 MILLIGRAM(S): at 05:21

## 2018-12-26 RX ADMIN — RIVAROXABAN 10 MILLIGRAM(S): KIT at 12:08

## 2018-12-26 RX ADMIN — Medication 1 MILLIGRAM(S): at 12:08

## 2018-12-26 RX ADMIN — Medication 325 MILLIGRAM(S): at 18:04

## 2018-12-26 RX ADMIN — HYDROMORPHONE HYDROCHLORIDE 4 MILLIGRAM(S): 2 INJECTION INTRAMUSCULAR; INTRAVENOUS; SUBCUTANEOUS at 13:45

## 2018-12-26 RX ADMIN — Medication 100 MILLIGRAM(S): at 21:43

## 2018-12-26 RX ADMIN — Medication 180 MILLIGRAM(S): at 05:21

## 2018-12-26 RX ADMIN — HYDROMORPHONE HYDROCHLORIDE 4 MILLIGRAM(S): 2 INJECTION INTRAMUSCULAR; INTRAVENOUS; SUBCUTANEOUS at 18:03

## 2018-12-26 RX ADMIN — FENTANYL CITRATE 1 PATCH: 50 INJECTION INTRAVENOUS at 18:02

## 2018-12-26 RX ADMIN — HYDROMORPHONE HYDROCHLORIDE 4 MILLIGRAM(S): 2 INJECTION INTRAMUSCULAR; INTRAVENOUS; SUBCUTANEOUS at 13:12

## 2018-12-26 RX ADMIN — Medication 1 TABLET(S): at 12:08

## 2018-12-26 RX ADMIN — HYDROMORPHONE HYDROCHLORIDE 4 MILLIGRAM(S): 2 INJECTION INTRAMUSCULAR; INTRAVENOUS; SUBCUTANEOUS at 18:35

## 2018-12-26 RX ADMIN — Medication 500 MILLIGRAM(S): at 05:21

## 2018-12-26 RX ADMIN — FENTANYL CITRATE 1 PATCH: 50 INJECTION INTRAVENOUS at 07:12

## 2018-12-26 RX ADMIN — HYDROMORPHONE HYDROCHLORIDE 4 MILLIGRAM(S): 2 INJECTION INTRAMUSCULAR; INTRAVENOUS; SUBCUTANEOUS at 03:30

## 2018-12-26 NOTE — DISCHARGE NOTE ADULT - MEDICATION SUMMARY - MEDICATIONS TO TAKE
I will START or STAY ON the medications listed below when I get home from the hospital:    oxyCODONE 10 mg oral tablet  -- 1 tab(s) by mouth 5 times a day, As Needed  -- Indication: For pain    aspirin 81 mg oral delayed release capsule  -- 1 tab(s) by mouth once a day  -- Indication: For antiplatelet therapy    acetaminophen 325 mg oral tablet  -- 2 tab(s) by mouth every 6 hours, As needed, Temp greater or equal to 38C (100.4F), Mild Pain (1 - 3)  -- Indication: For Fever, H/A    fentaNYL 50 mcg/hr transdermal film, extended release  -- 1 patch by transdermal patch every 72 hours  -- Indication: For Chronic pain medication    HYDROmorphone 2 mg oral tablet  -- 1 tab(s) by mouth every 4 hours, As needed, Moderate Pain (4 - 6)  -- Indication: For moderate pain    traMADol 50 mg oral tablet  -- 1 tab(s) by mouth every 6 hours, As needed, Mild Pain (1 - 3)  -- Indication: For mild pain     Cartia XT  -- 180 milligram(s) by mouth once a day  -- Indication: For Cardiovascular agent    rivaroxaban 10 mg oral tablet  -- 1 tab(s) by mouth once a day  -- Indication: For anticoagulation prophylaxis    DULoxetine  -- 60 milligram(s) by mouth once a day  -- Indication: For mood stabilizer    traZODone  -- 50 milligram(s) by mouth once a day (at bedtime)  -- Indication: For psychotheraputic agent    cevimeline  -- 30 milligram(s) by mouth 3 times a day  -- Indication: For CNS agent    furosemide  -- 40 milligram(s) by mouth once a day  -- Indication: For Diuretic    Amitiza  -- 24 microgram(s) by mouth 2 times a day  -- Indication: For gastrointestinal agent    docusate sodium 100 mg oral capsule  -- 1 cap(s) by mouth 3 times a day  -- Indication: For Stool softener    polyethylene glycol 3350 oral powder for reconstitution  -- 17 gram(s) by mouth once a day (at bedtime)  -- Indication: For laxative    baclofen  -- 10  by mouth once a day  -- Indication: For muscle relaxant    omeprazole  -- 20 milligram(s) by mouth once a day  -- Indication: For gastrointestinal agent

## 2018-12-26 NOTE — DISCHARGE NOTE ADULT - NS AS ACTIVITY OBS
Walking-Indoors allowed/maintain non-weight bearing ambulation on right lower extremity/No Heavy lifting/straining/Do not make important decisions/Do not drive or operate machinery/Walking-Outdoors allowed

## 2018-12-26 NOTE — PROGRESS NOTE ADULT - SUBJECTIVE AND OBJECTIVE BOX
78yFemale c/o R knee pain s/p mechanical fall in the bathroom. Patient denies head hit or LOC. Patient denies numbness or tingling in the RLE. Patient denies any other injuries. States she is minimally ambulatory around the house with a walker. When leaving the house, she uses a wheelchair. Patient also states she has two ulcers on her buttocks 2/2 remaining in seated positions for extended periods of time. She is s/p ORIF of right distal femur fracture with an open reduction and internal fixation (ORIF) of fracture of distal femur on 12/22/2018.  She needs care of her buttock decubiti, in addition to physical therapy for a periprosthetic fracture with ORIF      MEDICATIONS  (STANDING):  acetaminophen  IVPB .. 1000 milliGRAM(s) IV Intermittent once  ascorbic acid 500 milliGRAM(s) Oral two times a day  baclofen 10 milliGRAM(s) Oral daily  diltiazem    milliGRAM(s) Oral daily  docusate sodium 100 milliGRAM(s) Oral three times a day  DULoxetine 60 milliGRAM(s) Oral daily  fentaNYL   Patch  50 MICROgram(s)/Hr 1 Patch Transdermal every 72 hours  ferrous    sulfate 325 milliGRAM(s) Oral three times a day with meals  folic acid 1 milliGRAM(s) Oral daily  furosemide    Tablet 40 milliGRAM(s) Oral daily  lactated ringers. 1000 milliLiter(s) (75 mL/Hr) IV Continuous <Continuous>  multivitamin 1 Tablet(s) Oral daily  pantoprazole    Tablet 40 milliGRAM(s) Oral before breakfast  polyethylene glycol 3350 17 Gram(s) Oral at bedtime  rivaroxaban 10 milliGRAM(s) Oral daily    MEDICATIONS  (PRN):  acetaminophen   Tablet .. 650 milliGRAM(s) Oral every 6 hours PRN Temp greater or equal to 38C (100.4F), Mild Pain (1 - 3)  bisacodyl Suppository 10 milliGRAM(s) Rectal daily PRN If no bowel movement  HYDROmorphone   Tablet 2 milliGRAM(s) Oral every 4 hours PRN Moderate Pain (4 - 6)  HYDROmorphone   Tablet 4 milliGRAM(s) Oral every 4 hours PRN Severe Pain (7 - 10)  HYDROmorphone  Injectable 0.5 milliGRAM(s) IV Push every 4 hours PRN breakthrough  magnesium hydroxide Suspension 30 milliLiter(s) Oral daily PRN Constipation  ondansetron Injectable 4 milliGRAM(s) IV Push every 6 hours PRN Nausea and/or Vomiting  traMADol 50 milliGRAM(s) Oral every 6 hours PRN Mild Pain (1 - 3)          VITALS:   T(C): 36.8 (12-26-18 @ 21:22), Max: 36.9 (12-26-18 @ 13:10)  HR: 67 (12-26-18 @ 21:22) (67 - 83)  BP: 149/80 (12-26-18 @ 21:22) (112/72 - 153/78)  RR: 18 (12-26-18 @ 21:22) (16 - 18)  SpO2: 94% (12-26-18 @ 21:22) (93% - 97%)  Wt(kg): --      PHYSICAL EXAM:  GENERAL: NAD, well nourished and conversant  HEAD:  Atraumatic  EYES: EOM, PERRLA, conjunctiva pink and sclera white  ENT: No tonsillar erythema, exudates, or enlargement, moist mucous membranes, good dentition, no lesions  NECK: Supple, No JVD, normal thyroid, carotids with normal upstrokes and no bruits  CHEST/LUNG: Clear to auscultation bilaterally, No rales, rhonchi, wheezing, or rubs  HEART: Regular rate and rhythm, No murmurs, rubs, or gallops  ABDOMEN: Soft, nondistended, no masses, guarding, tenderness or rebound, bowel sounds present  EXTREMITIES:  2+ Peripheral Pulses, No clubbing, cyanosis, or edema.    (+) right distal femur fracture. 4+ right leg swelling and 3+ tenderness to touch. No erythema or incisional drainage.  LABS:        CBC Full  -  ( 25 Dec 2018 08:02 )  WBC Count : 8.73 K/uL  Hemoglobin : 8.4 g/dL  Hematocrit : 25.9 %  Platelet Count - Automated : 251 K/uL  Mean Cell Volume : 84.1 fl  Mean Cell Hemoglobin : 27.3 pg  Mean Cell Hemoglobin Concentration : 32.4 gm/dL  Auto Neutrophil # : x  Auto Lymphocyte # : x  Auto Monocyte # : x  Auto Eosinophil # : x  Auto Basophil # : x  Auto Neutrophil % : x  Auto Lymphocyte % : x  Auto Monocyte % : x  Auto Eosinophil % : x  Auto Basophil % : x    12-25    139  |  99  |  11  ----------------------------<  114<H>  3.3<L>   |  29  |  0.44<L>    Ca    8.6      25 Dec 2018 07:07            CAPILLARY BLOOD GLUCOSE          RADIOLOGY & ADDITIONAL TESTS:

## 2018-12-26 NOTE — PROGRESS NOTE ADULT - SUBJECTIVE AND OBJECTIVE BOX
Patient is a 78y old  Female who presents with a chief complaint of Right distal femur fracture   Patient s/p ORIF right distal femur POD#4  Patient resting without complaints.  No chest pain, SOB, N/V.    T(C): 36.6 (12-26-18 @ 05:23), Max: 37 (12-25-18 @ 14:31)  HR: 72 (12-26-18 @ 05:23) (68 - 83)  BP: 112/72 (12-26-18 @ 05:23) (112/72 - 134/74)  RR: 16 (12-26-18 @ 05:23) (16 - 18)  SpO2: 96% (12-26-18 @ 05:23) (93% - 96%)      Exam:  Alert and Oriented, No Acute Distress  Cards: +S1/S2, RRR  Pulm: CTAB  Lower Extremities: Right L/E in LL cylinder splint  Left Calve Soft, Non-tender  +DP Pulse                        8.4    8.73  )-----------( 251      ( 25 Dec 2018 08:02 )             25.9    12-25    139  |  99  |  11  ----------------------------<  114<H>  3.3<L>   |  29  |  0.44<L>    Ca    8.6      25 Dec 2018 07:07

## 2018-12-26 NOTE — DISCHARGE NOTE ADULT - PLAN OF CARE
surgical intervention should result in improved function Keep long leg cylinder splint clean and dry, maintain non-weight bearing ambulation on right lower extremity.

## 2018-12-26 NOTE — DISCHARGE NOTE ADULT - HOSPITAL COURSE
Reason for Admission: Right distal femur fracture	  History of Present Illness: 	  78yFemale c/o R knee pain s/p mechanical fall in the bathroom. Patient denies head hit or LOC. Patient denies numbness or tingling in the RLE. Patient denies any other injuries. States she is minimally ambulatory around the house with a walker. When leaving the house, she uses a wheelchair. Patient also states she has two ulcers on her buttocks 2/2 remaining in seated positions for extended periods of time  .    Patient History:    Past Medical History:  Breast CA    HTN (hypertension)    Scoliosis.     Past Surgical History:  Cataract    H/O  section    H/O mastectomy, right    H/O shoulder surgery    H/O: hysterectomy    History of hip replacement.    Hospital Course:  77 y/o FM underwent ORIF right distal femur on 18 with .  Patient tolerated procedure well.  Patient was evaluated postoperatively by physical and occupational therapists for non-weight bearing ambulation on right lower extremity in cylinder splint and advised that patient would benefit from admission to rehab facility.  Patient advised to keep surgical incision/dressing clean and dry, and have follow up with  post operative day #13-16 for wound check. Reason for Admission: Right distal femur fracture	  History of Present Illness: 	  78yFemale c/o R knee pain s/p mechanical fall in the bathroom. Patient denies head hit or LOC. Patient denies numbness or tingling in the RLE. Patient denies any other injuries. States she is minimally ambulatory around the house with a walker. When leaving the house, she uses a wheelchair. Patient also states she has two ulcers on her buttocks 2/2 remaining in seated positions for extended periods of time  .    Patient History:    Past Medical History:  Breast CA    HTN (hypertension)    Scoliosis.     Past Surgical History:  Cataract    H/O  section    H/O mastectomy, right    H/O shoulder surgery    H/O: hysterectomy    History of hip replacement.    Hospital Course:  79 y/o FM underwent ORIF right distal femur on 18 with .  Patient tolerated procedure well.  Patient was evaluated postoperatively by physical and occupational therapists for non-weight bearing ambulation on right lower extremity in cylinder splint and advised that patient would benefit from admission to rehab facility. Patient was transfused PRBCs perioperatively for acute blood loss anemia.  Patient advised to keep surgical incision/dressing clean and dry, and have follow up with  post operative day #13-16 for wound check.

## 2018-12-26 NOTE — DISCHARGE NOTE ADULT - PATIENT PORTAL LINK FT
You can access the [facility] Patient Portal, offered by [facility], by registering with the following website: [email or web address]

## 2018-12-26 NOTE — DISCHARGE NOTE ADULT - FINDINGS/TREATMENT
Keep long leg cylinder splint clean and dry, maintain non-weight bearing ambulation on right lower extremity.

## 2018-12-26 NOTE — DISCHARGE NOTE ADULT - CARE PLAN
Principal Discharge DX:	Femur fracture  Goal:	surgical intervention should result in improved function  Assessment and plan of treatment:	Keep long leg cylinder splint clean and dry, maintain non-weight bearing ambulation on right lower extremity.

## 2018-12-26 NOTE — PROGRESS NOTE ADULT - SUBJECTIVE AND OBJECTIVE BOX
[address] [facility] [address] [phone] Patient: Nancie Mathew MRN: [id number] YBP:0[birth year]5/2324 Visit Information Date & Time Provider Department Dept. Phone Encounter #  
 9/11/2018  8:00 AM Jose Guadalupe Rocha [address] [place] [address] for Pain Management [id number] Follow-up Instructions Return in about 3 months (around 12/11/2018). Your Appointments 12/10/2018  9:20 AM  
Follow Up with JULIETTE Garcia [address] [place] [address] for Pain Management (FINESSE SCHEDULING) Appt Note: Return in about 3 months (around 12/11/2018). [address] [facility] [address] [phone] [facility] [address] [id number] Upcoming Health Maintenance Date Due DTaP/Tdap/Td series (1 - Tdap) [birth year]3/1958 Pneumococcal 65+ Low/Medium Risk (2 of 2 - PPSV23) 11/1/2012 GLAUCOMA SCREENING Q2Y 6/10/2016 MEDICARE YEARLY EXAM 3/14/2018 Influenza Age 5 to Adult 8/1/2018 COLONOSCOPY 4/19/2028 Allergies as of 9/11/2018  Review Complete On: 9/11/2018 By: JULIETTE Garcia Severity Noted Reaction Type Reactions Statins-hmg-coa Reductase Inhibitors Medium 04/19/2018   Systemic Hives Ezetimibe  04/18/2018    Hives Forteo [Teriparatide]  09/13/2016    Other (comments), Nausea and Vomiting  
 abd pains 
abd pains Lipitor [Atorvastatin]  02/03/2017    Other (comments), Nausea and Vomiting  
 myalgias 
myalgias Livalo [Pitavastatin]  02/03/2017    Other (comments), Nausea and Vomiting  
 myalgias 
myalgias Seafood  07/03/2011   Systemic Hives Sulfa (Sulfonamide Antibiotics)  10/11/2007    Other (comments), Hives, Unknown (comments)  
 hives Sulfur    Other (comments)  
 rash Zocor [Simvastatin]  02/03/2017    Other (comments), Nausea and Vomiting  
 myalgias 
myalgias Current Immunizations  Reviewed on 8/25/2014 CARDIOLOGY     PROGRESS  NOTE   ________________________________________________    CHIEF COMPLAINT:Patient is a 78y old  Female who presents with a chief complaint of Right distal femur fracture (26 Dec 2018 07:16)  doing well.  	  REVIEW OF SYSTEMS:  CONSTITUTIONAL: No fever, weight loss, or fatigue  EYES: No eye pain, visual disturbances, or discharge  ENT:  No difficulty hearing, tinnitus, vertigo; No sinus or throat pain  NECK: No pain or stiffness  RESPIRATORY: No cough, wheezing, chills or hemoptysis; No Shortness of Breath  CARDIOVASCULAR: No chest pain, palpitations, passing out, dizziness, or leg swelling  GASTROINTESTINAL: No abdominal or epigastric pain. No nausea, vomiting, or hematemesis; No diarrhea or constipation. No melena or hematochezia.  GENITOURINARY: No dysuria, frequency, hematuria, or incontinence  NEUROLOGICAL: No headaches, memory loss, loss of strength, numbness, or tremors  SKIN: No itching, burning, rashes, or lesions   LYMPH Nodes: No enlarged glands  ENDOCRINE: No heat or cold intolerance; No hair loss  MUSCULOSKELETAL: No joint pain or swelling; No muscle, back, or extremity pain  PSYCHIATRIC: No depression, anxiety, mood swings, or difficulty sleeping  HEME/LYMPH: No easy bruising, or bleeding gums  ALLERGY AND IMMUNOLOGIC: No hives or eczema	    [ ] All others negative	  [ ] Unable to obtain    PHYSICAL EXAM:  T(C): 36.6 (12-26-18 @ 05:23), Max: 37 (12-25-18 @ 14:31)  HR: 72 (12-26-18 @ 05:23) (68 - 83)  BP: 112/72 (12-26-18 @ 05:23) (112/72 - 133/78)  RR: 16 (12-26-18 @ 05:23) (16 - 18)  SpO2: 96% (12-26-18 @ 05:23) (93% - 96%)  Wt(kg): --  I&O's Summary    25 Dec 2018 07:01  -  26 Dec 2018 07:00  --------------------------------------------------------  IN: 1390 mL / OUT: 850 mL / NET: 540 mL    26 Dec 2018 07:01  -  26 Dec 2018 09:11  --------------------------------------------------------  IN: 0 mL / OUT: 250 mL / NET: -250 mL        Appearance: Normal	  HEENT:   Normal oral mucosa, PERRL, EOMI	  Lymphatic: No lymphadenopathy  Cardiovascular: Normal S1 S2, No JVD, + murmurs, No edema  Respiratory: Lungs clear to auscultation	  Psychiatry: A & O x 3, Mood & affect appropriate  Gastrointestinal:  Soft, Non-tender, + BS	  Skin: No rashes, No ecchymoses, No cyanosis	  Neurologic: Non-focal  Extremities: Normal range of motion, No clubbing, cyanosis or edema  Vascular: Peripheral pulses palpable 2+ bilaterally    MEDICATIONS  (STANDING):  acetaminophen  IVPB .. 1000 milliGRAM(s) IV Intermittent once  ascorbic acid 500 milliGRAM(s) Oral two times a day  baclofen 10 milliGRAM(s) Oral daily  diltiazem    milliGRAM(s) Oral daily  docusate sodium 100 milliGRAM(s) Oral three times a day  DULoxetine 60 milliGRAM(s) Oral daily  fentaNYL   Patch  50 MICROgram(s)/Hr 1 Patch Transdermal every 72 hours  ferrous    sulfate 325 milliGRAM(s) Oral three times a day with meals  folic acid 1 milliGRAM(s) Oral daily  furosemide    Tablet 40 milliGRAM(s) Oral daily  lactated ringers. 1000 milliLiter(s) (75 mL/Hr) IV Continuous <Continuous>  multivitamin 1 Tablet(s) Oral daily  pantoprazole    Tablet 40 milliGRAM(s) Oral before breakfast  polyethylene glycol 3350 17 Gram(s) Oral at bedtime  rivaroxaban 10 milliGRAM(s) Oral daily      TELEMETRY: 	    ECG:  	  RADIOLOGY:  OTHER: 	  	  LABS:	 	    CARDIAC MARKERS:                                8.4    8.73  )-----------( 251      ( 25 Dec 2018 08:02 )             25.9     12-25    139  |  99  |  11  ----------------------------<  114<H>  3.3<L>   |  29  |  0.44<L>    Ca    8.6      25 Dec 2018 07:07      proBNP:   Lipid Profile:   HgA1c:   TSH:         Assessment and plan  ---------------------------  doing well, continue bp meds  dvt prophylaxis on xaralto  continue bp meds   ?rehab Name Date Pneumococcal Vaccine (Unspecified Type) 11/1/2007 Not reviewed this visit You Were Diagnosed With   
  
 Codes Comments Chronic pain disorder     ICD-10-CM: G89.4 ICD-9-CM: 338. 4 Chronic midline low back pain with bilateral sciatica     ICD-10-CM: M54.41, M54.42, G89.29 ICD-9-CM: 724.2, 724.3, 338.29 Bilateral hip pain     ICD-10-CM: M25.551, M25.552 ICD-9-CM: 719.45   
 DDD (degenerative disc disease), lumbar     ICD-10-CM: M51.36 
ICD-9-CM: 722.52 Lumbar disc herniation     ICD-10-CM: M51.26 
ICD-9-CM: 722.10 Lumbar facet arthropathy (HCC)     ICD-10-CM: M46.96 
ICD-9-CM: 721.3 Lumbar foraminal stenosis     ICD-10-CM: M99.83 ICD-9-CM: 724.02   
 Primary osteoarthritis involving multiple joints     ICD-10-CM: M15.0 ICD-9-CM: 715.09 Osteoarthritis of hip, unspecified laterality, unspecified osteoarthritis type     ICD-10-CM: M16.9 ICD-9-CM: 715.95 Vitals BP Pulse Temp Resp Height(growth percentile) Weight(growth percentile) 141/65 (BP 1 Location: Left arm, BP Patient Position: Sitting) 65 97 °F (36.1 °C) (Oral) 13 5' 1\" (1.549 m) 159 lb (72.1 kg) BMI OB Status Smoking Status 30.04 kg/m2 Hysterectomy Former Smoker Vitals History BMI and BSA Data Body Mass Index Body Surface Area 30.04 kg/m 2 1.76 m 2 Preferred Pharmacy Pharmacy Name Phone [facility], [address] [place] [address]  [phone] Your Updated Medication List  
  
   
This list is accurate as of 9/11/18  8:44 AM.  Always use your most recent med list. amLODIPine 5 mg tablet Commonly known as:  Jeannie Colon Take 5 mg by mouth daily. aspirin delayed-release 81 mg tablet Take 1 Tab by mouth daily. Start 5/1/2017  
  
 chlorthalidone 25 mg tablet Commonly known as:  Chloe Folk Take 1 Tab by mouth daily. enoxaparin 30 mg/0.3 mL injection Commonly known as:  LOVENOX  
30 mg by SubCUTAneous route daily. Pt on 20 day treatment s/p recent surgery  
  
 gabapentin 300 mg capsule Commonly known as:  NEURONTIN Take 100 mg by mouth nightly. hydroCHLOROthiazide 25 mg tablet Commonly known as:  HYDRODIURIL Take 25 mg by mouth daily. hydrOXYzine pamoate 25 mg capsule Commonly known as:  VISTARIL Take 25 mg by mouth three (3) times daily as needed for Itching. omeprazole 20 mg capsule Commonly known as:  PRILOSEC Take 20 mg by mouth daily. * oxyCODONE-acetaminophen 7.5-325 mg per tablet Commonly known as:  PERCOCET 7.5 Take 1 Tab by mouth three (3) times daily as needed for Pain for up to 30 days. Max Daily Amount: 3 Tabs. * oxyCODONE-acetaminophen 7.5-325 mg per tablet Commonly known as:  PERCOCET 7.5 Take 1 Tab by mouth three (3) times daily as needed for Pain for up to 30 days. Max Daily Amount: 3 Tabs. No more than #85 per month Start taking on:  10/10/2018 * oxyCODONE-acetaminophen 7.5-325 mg per tablet Commonly known as:  PERCOCET 7.5 Take 1 Tab by mouth three (3) times daily as needed for Pain for up to 30 days. Max Daily Amount: 3 Tabs. No more than #80 per month Start taking on:  11/9/2018  
  
 predniSONE 5 mg tablet Commonly known as:  Farzana Chill Take 4 mg by mouth three (3) times daily. REFRESH LIQUIGEL 1 % Dlgl Generic drug:  carboxymethylcellulose sodium Apply 1 Drop to eye as needed. Indications: DRY EYE  
  
 VITAMIN D2 50,000 unit capsule Generic drug:  ergocalciferol Take 50,000 Units by mouth every seven (7) days. * Notice: This list has 3 medication(s) that are the same as other medications prescribed for you. Read the directions carefully, and ask your doctor or other care provider to review them with you. Prescriptions Printed  Refills  
 oxyCODONE-acetaminophen (PERCOCET 7.5) 7.5-325 mg per tablet 0  
 Sig: Take 1 Tab by mouth three (3) times daily as needed for Pain for up to 30 days. Max Daily Amount: 3 Tabs. Class: Print Route: Oral  
 oxyCODONE-acetaminophen (PERCOCET 7.5) 7.5-325 mg per tablet 0 Starting on: 10/10/2018 Sig: Take 1 Tab by mouth three (3) times daily as needed for Pain for up to 30 days. Max Daily Amount: 3 Tabs. No more than #85 per month Class: Print Route: Oral  
 oxyCODONE-acetaminophen (PERCOCET 7.5) 7.5-325 mg per tablet 0 Starting on: 11/9/2018 Sig: Take 1 Tab by mouth three (3) times daily as needed for Pain for up to 30 days. Max Daily Amount: 3 Tabs. No more than #80 per month Class: Print Route: Oral  
  
We Performed the Following AMB SUPPLY ORDER [[id number] Custom] Comments: H-Wave for home use. 30 days. No substitute. Follow-up Instructions Return in about 3 months (around 12/11/2018). Patient Instructions 1. Modify current plan with no evidence of addiction or diversion. Stable on current medication without adverse events. 2. Refill oxycodone 7.5/325 mg 3 times daily as needed ×1 month, then adjust down to 3 times daily as needed, no more than #85 per month ×1 month, then adjust down to #80 per month until next visit. 3. Continue home therapy for right hip 4. Plan to Schedule appointment with Dr. Kip Alfaro to discuss possible left intertrochanteric hip injection later when needed. 5. Order H wave therapy 6. Continue to follow-up with orthopedic surgeon regarding right hip 7. Discussed risks of addiction, dependency, and opioid induced hyperalgesia. Please remember to call at least 5 business days prior to your medication refill. Return to clinic in 3 months. Please call and cancel your appointment and pain management agreement if you do decide to transfer your care. Introducing [facility]! Dear Diana Grubbs: Thank you for requesting a [employer] account.   Our records indicate that you have previously registered for a [employer] account but its currently inactive. Please call our Klip support line at [phone]. Additional Information If you have questions, please visit the Frequently Asked Questions section of the Kl[employer] website at [email or web address] Remember, Klip is NOT to be used for urgent needs. For medical emergencies, dial 911. Now available from your iPhone and Android! Please provide this summary of care documentation to your next provider. Your primary care clinician is listed as Raúl Zarate. If you have any questions after today's visit, please call [phone].

## 2018-12-26 NOTE — DISCHARGE NOTE ADULT - CARE PROVIDER_API CALL
Nicole Valencia (MD), Orthopaedic Surgery  [address] [place] [id number]  Phone: [phone]  Fax: [phone]

## 2018-12-26 NOTE — DISCHARGE NOTE ADULT - ADDITIONAL INSTRUCTIONS
Keep long leg cylinder splint clean and dry, maintain non-weight bearing ambulation on right lower extremity. Follow up with Dr Valencia post operative day #13 (1/4/19), for wound check. Follow up with your primary care provider once discharged from rehab facility.

## 2018-12-26 NOTE — PROGRESS NOTE ADULT - ATTENDING COMMENTS
78yFemale c/o R knee pain s/p mechanical fall in the bathroom. Patient denies head hit or LOC. Patient denies numbness or tingling in the RLE. Patient denies any other injuries. States she is minimally ambulatory around the house with a walker. When leaving the house, she uses a wheelchair. Patient also states she has two ulcers on her buttocks 2/2 remaining in seated positions for extended periods of time. She is s/p ORIF of right distal femur fracture with an open reduction and internal fixation (ORIF) of fracture of distal femur on 12/22/2018.  She needs care of her buttock decubiti, in addition to physical therapy for a periprosthetic fracture with ORIF
Patient examined. Chart reviewed. Agree with above note.    Nicole Valencia MD
78yFemale c/o R knee pain s/p mechanical fall in the bathroom. Patient denies head hit or LOC. Patient denies numbness or tingling in the RLE. Patient denies any other injuries. States she is minimally ambulatory around the house with a walker. When leaving the house, she uses a wheelchair. Patient also states she has two ulcers on her buttocks 2/2 remaining in seated positions for extended periods of time. She is s/p ORIF of right distal femur fracture with an open reduction and internal fixation (ORIF) of fracture of distal femur on 12/22/2018.  She needs care of her buttock decubiti, in addition to physical therapy for a periprosthetic fracture with ORIF

## 2018-12-27 VITALS
TEMPERATURE: 98 F | RESPIRATION RATE: 18 BRPM | SYSTOLIC BLOOD PRESSURE: 126 MMHG | HEART RATE: 76 BPM | OXYGEN SATURATION: 95 % | DIASTOLIC BLOOD PRESSURE: 61 MMHG

## 2018-12-27 PROCEDURE — 73560 X-RAY EXAM OF KNEE 1 OR 2: CPT

## 2018-12-27 PROCEDURE — 73700 CT LOWER EXTREMITY W/O DYE: CPT

## 2018-12-27 PROCEDURE — 96374 THER/PROPH/DIAG INJ IV PUSH: CPT

## 2018-12-27 PROCEDURE — 82330 ASSAY OF CALCIUM: CPT

## 2018-12-27 PROCEDURE — P9040: CPT

## 2018-12-27 PROCEDURE — 81001 URINALYSIS AUTO W/SCOPE: CPT

## 2018-12-27 PROCEDURE — 86850 RBC ANTIBODY SCREEN: CPT

## 2018-12-27 PROCEDURE — 97530 THERAPEUTIC ACTIVITIES: CPT

## 2018-12-27 PROCEDURE — 85014 HEMATOCRIT: CPT

## 2018-12-27 PROCEDURE — 82947 ASSAY GLUCOSE BLOOD QUANT: CPT

## 2018-12-27 PROCEDURE — C1769: CPT

## 2018-12-27 PROCEDURE — 71045 X-RAY EXAM CHEST 1 VIEW: CPT

## 2018-12-27 PROCEDURE — 73564 X-RAY EXAM KNEE 4 OR MORE: CPT

## 2018-12-27 PROCEDURE — 86923 COMPATIBILITY TEST ELECTRIC: CPT

## 2018-12-27 PROCEDURE — 99285 EMERGENCY DEPT VISIT HI MDM: CPT | Mod: 25

## 2018-12-27 PROCEDURE — 85027 COMPLETE CBC AUTOMATED: CPT

## 2018-12-27 PROCEDURE — 85730 THROMBOPLASTIN TIME PARTIAL: CPT

## 2018-12-27 PROCEDURE — 36430 TRANSFUSION BLD/BLD COMPNT: CPT

## 2018-12-27 PROCEDURE — 80048 BASIC METABOLIC PNL TOTAL CA: CPT

## 2018-12-27 PROCEDURE — 76000 FLUOROSCOPY <1 HR PHYS/QHP: CPT

## 2018-12-27 PROCEDURE — C1713: CPT

## 2018-12-27 PROCEDURE — 84295 ASSAY OF SERUM SODIUM: CPT

## 2018-12-27 PROCEDURE — 86901 BLOOD TYPING SEROLOGIC RH(D): CPT

## 2018-12-27 PROCEDURE — 84132 ASSAY OF SERUM POTASSIUM: CPT

## 2018-12-27 PROCEDURE — 73552 X-RAY EXAM OF FEMUR 2/>: CPT

## 2018-12-27 PROCEDURE — 83605 ASSAY OF LACTIC ACID: CPT

## 2018-12-27 PROCEDURE — 93005 ELECTROCARDIOGRAM TRACING: CPT

## 2018-12-27 PROCEDURE — 82435 ASSAY OF BLOOD CHLORIDE: CPT

## 2018-12-27 PROCEDURE — C1889: CPT

## 2018-12-27 PROCEDURE — 73502 X-RAY EXAM HIP UNI 2-3 VIEWS: CPT

## 2018-12-27 PROCEDURE — 72170 X-RAY EXAM OF PELVIS: CPT

## 2018-12-27 PROCEDURE — 82803 BLOOD GASES ANY COMBINATION: CPT

## 2018-12-27 PROCEDURE — 97162 PT EVAL MOD COMPLEX 30 MIN: CPT

## 2018-12-27 PROCEDURE — 97110 THERAPEUTIC EXERCISES: CPT

## 2018-12-27 PROCEDURE — 80053 COMPREHEN METABOLIC PANEL: CPT

## 2018-12-27 PROCEDURE — 97116 GAIT TRAINING THERAPY: CPT

## 2018-12-27 PROCEDURE — 86900 BLOOD TYPING SEROLOGIC ABO: CPT

## 2018-12-27 PROCEDURE — 85610 PROTHROMBIN TIME: CPT

## 2018-12-27 PROCEDURE — 76377 3D RENDER W/INTRP POSTPROCES: CPT

## 2018-12-27 RX ADMIN — Medication 180 MILLIGRAM(S): at 05:45

## 2018-12-27 RX ADMIN — Medication 1 TABLET(S): at 12:51

## 2018-12-27 RX ADMIN — Medication 325 MILLIGRAM(S): at 12:51

## 2018-12-27 RX ADMIN — PANTOPRAZOLE SODIUM 40 MILLIGRAM(S): 20 TABLET, DELAYED RELEASE ORAL at 05:46

## 2018-12-27 RX ADMIN — Medication 10 MILLIGRAM(S): at 12:51

## 2018-12-27 RX ADMIN — Medication 100 MILLIGRAM(S): at 05:45

## 2018-12-27 RX ADMIN — RIVAROXABAN 10 MILLIGRAM(S): KIT at 12:52

## 2018-12-27 RX ADMIN — Medication 40 MILLIGRAM(S): at 05:45

## 2018-12-27 RX ADMIN — FENTANYL CITRATE 1 PATCH: 50 INJECTION INTRAVENOUS at 07:03

## 2018-12-27 RX ADMIN — HYDROMORPHONE HYDROCHLORIDE 4 MILLIGRAM(S): 2 INJECTION INTRAMUSCULAR; INTRAVENOUS; SUBCUTANEOUS at 11:30

## 2018-12-27 RX ADMIN — DULOXETINE HYDROCHLORIDE 60 MILLIGRAM(S): 30 CAPSULE, DELAYED RELEASE ORAL at 12:51

## 2018-12-27 RX ADMIN — HYDROMORPHONE HYDROCHLORIDE 4 MILLIGRAM(S): 2 INJECTION INTRAMUSCULAR; INTRAVENOUS; SUBCUTANEOUS at 10:33

## 2018-12-27 RX ADMIN — Medication 500 MILLIGRAM(S): at 05:45

## 2018-12-27 RX ADMIN — Medication 1 MILLIGRAM(S): at 12:51

## 2018-12-27 NOTE — PROGRESS NOTE ADULT - ASSESSMENT
Assessment and Plan:   · Assessment		  78yFemale c/o R knee pain s/p mechanical fall in the bathroom. Patient denies head hit or LOC. Patient denies numbness or tingling in the RLE. Patient denies any other injuries. States she is minimally ambulatory around the house with a walker. When leaving the house, she uses a wheelchair. Patient also states she has two ulcers on her buttocks 2/2 remaining in seated positions for extended periods of time.     Problem/Plan - 1:  ·  Problem: Femur fracture.    Plan: Patient is s/p ORIF of femur fracture    Pain under control.   Physical therapy as tolerate  has started Physical therapy     Problem/Plan - 2:  ·  Problem: Essential hypertension.     Blood pressure under control.   add med as needed     Problem/Plan - 3:  ·  Problem: Scoliosis.  Plan: Causing restrictive pulmonary problem  Deep breathing and incentive spirometry 12 times per hour.      Problem/Plan - 4:  ·  Problem: Breast CA.  Plan: Currently stable.      Problem/Plan - 5:  ·  Problem: Decubitus ulcer of buttock.  Plan: Wound care with Silvadene ointment.
77 y/o FM s/p ORIF right distal femur POD#1, NWB right L/E, incentive spirometer  Jessica Mckeon PA-C  Orthopaedic Surgery  Team pager [phone]  [facility] [phone]  [phone]
77 y/o FM s/p ORIF right distal femur POD#2, NWB right L/E, incentive spirometer  PT/OT/OOB  Dispo Planning-Rehab  DVT PPx
77 y/o FM s/p ORIF right distal femur POD#3, physical therapy, rehab when bed availble  Jessica Mckeon PA-C  Orthopaedic Surgery  Team pager [phone]  [facility] [phone]  [phone]
77 y/o fm s/p ORIF right distal femur POD#4, NWB right L/E, rehab  Jessica Mckeon PA-C  Orthopaedic Surgery  Team pager [phone]  [facility] [phone]  [phone]
78F s/p R distal femur ORIF  Analgesia  DVT ppx  Incentive spirometry  NWB RLE  P/T  Discharge planning
78F s/p R distal femur ORIF POD 0  Analgesia  DVT ppx  Incentive spirometry  NWB RLE  P/T  Discharge planning
78yFemale c/o R knee pain s/p mechanical fall in the bathroom. Patient denies head hit or LOC. Patient denies numbness or tingling in the RLE. Patient denies any other injuries. States she is minimally ambulatory around the house with a walker. When leaving the house, she uses a wheelchair. Patient also states she has two ulcers on her buttocks 2/2 remaining in seated positions for extended periods of time.
Assessment and Plan:   · Assessment		  78yFemale c/o R knee pain s/p mechanical fall in the bathroom. Patient denies head hit or LOC. Patient denies numbness or tingling in the RLE. Patient denies any other injuries. States she is minimally ambulatory around the house with a walker. When leaving the house, she uses a wheelchair. Patient also states she has two ulcers on her buttocks 2/2 remaining in seated positions for extended periods of time.     Problem/Plan - 1:  ·  Problem: Femur fracture.    Plan: Patient is s/p ORIF of femur fracture    Pain under control.   Physical therapy as tolerate     Problem/Plan - 2:  ·  Problem: Essential hypertension.     Blood pressure under control.   add med as needed     Problem/Plan - 3:  ·  Problem: Scoliosis.  Plan: Causing restrictive pulmonary problem  Deep breathing and incentive spirometry 12 times per hour.      Problem/Plan - 4:  ·  Problem: Breast CA.  Plan: Currently stable.      Problem/Plan - 5:  ·  Problem: Decubitus ulcer of buttock.  Plan: Wound care with Silvadene ointment.       Attending Attestation:   40 minutes spent on total encounter; more than 50% of the visit was spent counseling and/or coordinating care by the attending physician.
Assessment and Plan:   · Assessment		  78yFemale c/o R knee pain s/p mechanical fall in the bathroom. Patient denies head hit or LOC. Patient denies numbness or tingling in the RLE. Patient denies any other injuries. States she is minimally ambulatory around the house with a walker. When leaving the house, she uses a wheelchair. Patient also states she has two ulcers on her buttocks 2/2 remaining in seated positions for extended periods of time.     Problem/Plan - 1:  ·  Problem: Femur fracture.    Plan: Patient is s/p ORIF of femur fracture    Pain under control.   Physical therapy as tolerate     Problem/Plan - 2:  ·  Problem: Essential hypertension.     Blood pressure under control.   add med as needed     Problem/Plan - 3:  ·  Problem: Scoliosis.  Plan: Causing restrictive pulmonary problem  Deep breathing and incentive spirometry 12 times per hour.      Problem/Plan - 4:  ·  Problem: Breast CA.  Plan: Currently stable.      Problem/Plan - 5:  ·  Problem: Decubitus ulcer of buttock.  Plan: Wound care with Silvadene ointment.       Attending Attestation:   40 minutes spent on total encounter; more than 50% of the visit was spent counseling and/or coordinating care by the attending physician.
Assessment and Plan:   · Assessment		  78yFemale c/o R knee pain s/p mechanical fall in the bathroom. Patient denies head hit or LOC. Patient denies numbness or tingling in the RLE. Patient denies any other injuries. States she is minimally ambulatory around the house with a walker. When leaving the house, she uses a wheelchair. Patient also states she has two ulcers on her buttocks 2/2 remaining in seated positions for extended periods of time.     Problem/Plan - 1:  ·  Problem: Femur fracture.  Plan: Patient is s/p ORIF of femur fracture  --  Pain under control.      Problem/Plan - 2:  ·  Problem: Essential hypertension.  Plan: Blood pressure under control.      Problem/Plan - 3:  ·  Problem: Scoliosis.  Plan: Causing restrictive pulmonary problem  Deep breathing and incentive spirometry 12 times per hour.      Problem/Plan - 4:  ·  Problem: Breast CA.  Plan: Currently stable.      Problem/Plan - 5:  ·  Problem: Decubitus ulcer of buttock.  Plan: Wound care with Silvadene ointment.     Problem 6:  blood loss anemia -- Hemoglobin 7 consider PRBC transfusion    Attending Attestation:   40 minutes spent on total encounter; more than 50% of the visit was spent counseling and/or coordinating care by the attending physician.     Plan discussed with patient  and staff.

## 2018-12-27 NOTE — PROGRESS NOTE ADULT - SUBJECTIVE AND OBJECTIVE BOX
CARDIOLOGY     PROGRESS  NOTE   ________________________________________________    CHIEF COMPLAINT:Patient is a 78y old  Female who presents with a chief complaint of Right distal femur fracture (27 Dec 2018 06:29)    	  REVIEW OF SYSTEMS:  CONSTITUTIONAL: No fever, weight loss, or fatigue  EYES: No eye pain, visual disturbances, or discharge  ENT:  No difficulty hearing, tinnitus, vertigo; No sinus or throat pain  NECK: No pain or stiffness  RESPIRATORY: No cough, wheezing, chills or hemoptysis; No Shortness of Breath  CARDIOVASCULAR: No chest pain, palpitations, passing out, dizziness, or leg swelling  GASTROINTESTINAL: No abdominal or epigastric pain. No nausea, vomiting, or hematemesis; No diarrhea or constipation. No melena or hematochezia.  GENITOURINARY: No dysuria, frequency, hematuria, or incontinence  NEUROLOGICAL: No headaches, memory loss, loss of strength, numbness, or tremors  SKIN: No itching, burning, rashes, or lesions   LYMPH Nodes: No enlarged glands  ENDOCRINE: No heat or cold intolerance; No hair loss  MUSCULOSKELETAL: No joint pain or swelling; No muscle, back, or extremity pain  PSYCHIATRIC: No depression, anxiety, mood swings, or difficulty sleeping  HEME/LYMPH: No easy bruising, or bleeding gums  ALLERGY AND IMMUNOLOGIC: No hives or eczema	    [ ] All others negative	  [ ] Unable to obtain    PHYSICAL EXAM:  T(C): 36.9 (12-27-18 @ 04:59), Max: 36.9 (12-26-18 @ 13:10)  HR: 74 (12-27-18 @ 04:59) (67 - 77)  BP: 139/80 (12-27-18 @ 04:59) (116/67 - 153/78)  RR: 18 (12-27-18 @ 04:59) (16 - 18)  SpO2: 96% (12-27-18 @ 04:59) (94% - 97%)  Wt(kg): --  I&O's Summary    26 Dec 2018 07:01  -  27 Dec 2018 07:00  --------------------------------------------------------  IN: 1400 mL / OUT: 1500 mL / NET: -100 mL        Appearance: Normal	  HEENT:   Normal oral mucosa, PERRL, EOMI	  Lymphatic: No lymphadenopathy  Cardiovascular: Normal S1 S2, No JVD, No murmurs, No edema  Respiratory: Lungs clear to auscultation	  Psychiatry: A & O x 3, Mood & affect appropriate  Gastrointestinal:  Soft, Non-tender, + BS	  Skin: No rashes, No ecchymoses, No cyanosis	  Neurologic: Non-focal  Extremities: Normal range of motion, No clubbing, cyanosis or edema  Vascular: Peripheral pulses palpable 2+ bilaterally    MEDICATIONS  (STANDING):  acetaminophen  IVPB .. 1000 milliGRAM(s) IV Intermittent once  ascorbic acid 500 milliGRAM(s) Oral two times a day  baclofen 10 milliGRAM(s) Oral daily  diltiazem    milliGRAM(s) Oral daily  docusate sodium 100 milliGRAM(s) Oral three times a day  DULoxetine 60 milliGRAM(s) Oral daily  fentaNYL   Patch  50 MICROgram(s)/Hr 1 Patch Transdermal every 72 hours  ferrous    sulfate 325 milliGRAM(s) Oral three times a day with meals  folic acid 1 milliGRAM(s) Oral daily  furosemide    Tablet 40 milliGRAM(s) Oral daily  lactated ringers. 1000 milliLiter(s) (75 mL/Hr) IV Continuous <Continuous>  multivitamin 1 Tablet(s) Oral daily  pantoprazole    Tablet 40 milliGRAM(s) Oral before breakfast  polyethylene glycol 3350 17 Gram(s) Oral at bedtime  rivaroxaban 10 milliGRAM(s) Oral daily      TELEMETRY: 	    ECG:  	  RADIOLOGY:  OTHER: 	  	  LABS:	 	    CARDIAC MARKERS:                  proBNP:   Lipid Profile:   HgA1c:   TSH:         Assessment and plan  ---------------------------  doing well  f.u cbc, keep hgb>8  dvt prophylaxis  bp meds

## 2018-12-27 NOTE — PROGRESS NOTE ADULT - PROVIDER SPECIALTY LIST ADULT
Cardiology
Internal Medicine
Orthopedics
Internal Medicine

## 2018-12-27 NOTE — PROGRESS NOTE ADULT - REASON FOR ADMISSION
Right distal femur fracture

## 2019-01-08 ENCOUNTER — APPOINTMENT (OUTPATIENT)
Dept: WOUND CARE | Facility: CLINIC | Age: 79
End: 2019-01-08

## 2019-01-08 VITALS — HEIGHT: 66 IN

## 2019-01-08 PROBLEM — Z87.39 HISTORY OF ARTHRITIS: Status: RESOLVED | Noted: 2017-02-21 | Resolved: 2019-01-08

## 2019-01-08 PROBLEM — Z87.891 FORMER SMOKER: Status: ACTIVE | Noted: 2017-02-21

## 2019-09-16 NOTE — ED PROVIDER NOTE - ATTENDING CONTRIBUTION TO CARE
"Chief Complaint   Patient presents with     A.D.H.D     recheck       Initial BP (!) 170/110 (BP Location: Right arm, Patient Position: Sitting, Cuff Size: Adult Regular)   Pulse 81   Temp 97.7  F (36.5  C) (Temporal)   Ht 1.632 m (5' 4.25\")   Wt 68.6 kg (151 lb 3.2 oz)   SpO2 100%   Breastfeeding? No   BMI 25.75 kg/m   Estimated body mass index is 25.75 kg/m  as calculated from the following:    Height as of this encounter: 1.632 m (5' 4.25\").    Weight as of this encounter: 68.6 kg (151 lb 3.2 oz).  Medication Reconciliation: complete      COLE Ramos      " 78yo F with BLE edema and erythema.  NAD, afebrile, NCAT, PERRL, CN grossly intact, S1S2, CTAB, abd soft nontender with no rebound/guarding/masses, BLE edema with erythema.  IV abx, labs, admit.

## 2020-02-09 NOTE — ED PROVIDER NOTE - NS ED MD TWO NIGHTS YN
20 year old female PMH of H. pylori and recent h/o pyloric exclusion, gastrojejunostomy creation, omental patch and feeding j-tube placement for perforation post-EGD for pyloric stenosis presents with clinical and radiologic evidence suscipcious for partial small bowel obstruction.    CLD  Pain/Nausea control after assessment  SCDs  Pt tolerating regular diet
No

## 2020-05-19 ENCOUNTER — APPOINTMENT (OUTPATIENT)
Dept: PAIN MANAGEMENT | Facility: CLINIC | Age: 80
End: 2020-05-19

## 2021-08-27 ENCOUNTER — APPOINTMENT (OUTPATIENT)
Dept: PAIN MANAGEMENT | Facility: CLINIC | Age: 81
End: 2021-08-27

## 2021-11-18 ENCOUNTER — INPATIENT (INPATIENT)
Facility: HOSPITAL | Age: 81
LOS: 10 days | Discharge: NURSING, REHAB OR LONG-TERM CARE | DRG: 947 | End: 2021-11-29
Attending: INTERNAL MEDICINE | Admitting: INTERNAL MEDICINE
Payer: MEDICARE

## 2021-11-18 VITALS
RESPIRATION RATE: 15 BRPM | SYSTOLIC BLOOD PRESSURE: 113 MMHG | OXYGEN SATURATION: 84 % | TEMPERATURE: 98 F | HEIGHT: 66 IN | HEART RATE: 67 BPM | DIASTOLIC BLOOD PRESSURE: 66 MMHG

## 2021-11-18 DIAGNOSIS — R53.83 OTHER FATIGUE: ICD-10-CM

## 2021-11-18 DIAGNOSIS — Z98.890 OTHER SPECIFIED POSTPROCEDURAL STATES: Chronic | ICD-10-CM

## 2021-11-18 DIAGNOSIS — H26.9 UNSPECIFIED CATARACT: Chronic | ICD-10-CM

## 2021-11-18 DIAGNOSIS — Z96.649 PRESENCE OF UNSPECIFIED ARTIFICIAL HIP JOINT: Chronic | ICD-10-CM

## 2021-11-18 DIAGNOSIS — Z90.11 ACQUIRED ABSENCE OF RIGHT BREAST AND NIPPLE: Chronic | ICD-10-CM

## 2021-11-18 DIAGNOSIS — Z98.891 HISTORY OF UTERINE SCAR FROM PREVIOUS SURGERY: Chronic | ICD-10-CM

## 2021-11-18 DIAGNOSIS — Z90.710 ACQUIRED ABSENCE OF BOTH CERVIX AND UTERUS: Chronic | ICD-10-CM

## 2021-11-18 LAB
ALBUMIN SERPL ELPH-MCNC: 3.4 G/DL — SIGNIFICANT CHANGE UP (ref 3.3–5)
ALP SERPL-CCNC: 71 U/L — SIGNIFICANT CHANGE UP (ref 40–120)
ALT FLD-CCNC: 8 U/L — LOW (ref 10–45)
AMPHET UR-MCNC: NEGATIVE — SIGNIFICANT CHANGE UP
ANION GAP SERPL CALC-SCNC: 11 MMOL/L — SIGNIFICANT CHANGE UP (ref 5–17)
APAP SERPL-MCNC: <15 UG/ML — SIGNIFICANT CHANGE UP (ref 10–30)
APPEARANCE UR: CLEAR — SIGNIFICANT CHANGE UP
APTT BLD: 28 SEC — SIGNIFICANT CHANGE UP (ref 27.5–35.5)
AST SERPL-CCNC: 21 U/L — SIGNIFICANT CHANGE UP (ref 10–40)
BACTERIA # UR AUTO: NEGATIVE — SIGNIFICANT CHANGE UP
BARBITURATES UR SCN-MCNC: NEGATIVE — SIGNIFICANT CHANGE UP
BASE EXCESS BLDV CALC-SCNC: 5.4 MMOL/L — HIGH (ref -2–2)
BASOPHILS # BLD AUTO: 0.05 K/UL — SIGNIFICANT CHANGE UP (ref 0–0.2)
BASOPHILS NFR BLD AUTO: 0.6 % — SIGNIFICANT CHANGE UP (ref 0–2)
BENZODIAZ UR-MCNC: NEGATIVE — SIGNIFICANT CHANGE UP
BILIRUB SERPL-MCNC: 0.5 MG/DL — SIGNIFICANT CHANGE UP (ref 0.2–1.2)
BILIRUB UR-MCNC: NEGATIVE — SIGNIFICANT CHANGE UP
BUN SERPL-MCNC: 21 MG/DL — SIGNIFICANT CHANGE UP (ref 7–23)
CA-I SERPL-SCNC: 1.19 MMOL/L — SIGNIFICANT CHANGE UP (ref 1.15–1.33)
CALCIUM SERPL-MCNC: 9 MG/DL — SIGNIFICANT CHANGE UP (ref 8.4–10.5)
CHLORIDE BLDV-SCNC: 104 MMOL/L — SIGNIFICANT CHANGE UP (ref 96–108)
CHLORIDE SERPL-SCNC: 104 MMOL/L — SIGNIFICANT CHANGE UP (ref 96–108)
CK MB BLD-MCNC: 6 % — HIGH (ref 0–3.5)
CK MB CFR SERPL CALC: 3.7 NG/ML — SIGNIFICANT CHANGE UP (ref 0–3.8)
CK SERPL-CCNC: 62 U/L — SIGNIFICANT CHANGE UP (ref 25–170)
CO2 BLDV-SCNC: 33 MMOL/L — HIGH (ref 22–26)
CO2 SERPL-SCNC: 26 MMOL/L — SIGNIFICANT CHANGE UP (ref 22–31)
COCAINE METAB.OTHER UR-MCNC: NEGATIVE — SIGNIFICANT CHANGE UP
COLOR SPEC: SIGNIFICANT CHANGE UP
CREAT SERPL-MCNC: 1.22 MG/DL — SIGNIFICANT CHANGE UP (ref 0.5–1.3)
DIFF PNL FLD: ABNORMAL
EOSINOPHIL # BLD AUTO: 0.24 K/UL — SIGNIFICANT CHANGE UP (ref 0–0.5)
EOSINOPHIL NFR BLD AUTO: 2.9 % — SIGNIFICANT CHANGE UP (ref 0–6)
EPI CELLS # UR: 1 /HPF — SIGNIFICANT CHANGE UP
ETHANOL SERPL-MCNC: SIGNIFICANT CHANGE UP MG/DL (ref 0–10)
GAS PNL BLDV: 137 MMOL/L — SIGNIFICANT CHANGE UP (ref 136–145)
GAS PNL BLDV: SIGNIFICANT CHANGE UP
GAS PNL BLDV: SIGNIFICANT CHANGE UP
GLUCOSE BLDV-MCNC: 93 MG/DL — SIGNIFICANT CHANGE UP (ref 70–99)
GLUCOSE SERPL-MCNC: 94 MG/DL — SIGNIFICANT CHANGE UP (ref 70–99)
GLUCOSE UR QL: NEGATIVE — SIGNIFICANT CHANGE UP
HCO3 BLDV-SCNC: 31 MMOL/L — HIGH (ref 22–29)
HCT VFR BLD CALC: 39.1 % — SIGNIFICANT CHANGE UP (ref 34.5–45)
HCT VFR BLDA CALC: 37 % — SIGNIFICANT CHANGE UP (ref 34.5–46.5)
HGB BLD CALC-MCNC: 12.2 G/DL — SIGNIFICANT CHANGE UP (ref 11.7–16.1)
HGB BLD-MCNC: 12.3 G/DL — SIGNIFICANT CHANGE UP (ref 11.5–15.5)
HYALINE CASTS # UR AUTO: 0 /LPF — SIGNIFICANT CHANGE UP (ref 0–2)
IMM GRANULOCYTES NFR BLD AUTO: 0.4 % — SIGNIFICANT CHANGE UP (ref 0–1.5)
INR BLD: 1.34 RATIO — HIGH (ref 0.88–1.16)
KETONES UR-MCNC: NEGATIVE — SIGNIFICANT CHANGE UP
LACTATE BLDV-MCNC: 1.4 MMOL/L — SIGNIFICANT CHANGE UP (ref 0.7–2)
LEUKOCYTE ESTERASE UR-ACNC: NEGATIVE — SIGNIFICANT CHANGE UP
LYMPHOCYTES # BLD AUTO: 3.03 K/UL — SIGNIFICANT CHANGE UP (ref 1–3.3)
LYMPHOCYTES # BLD AUTO: 36.8 % — SIGNIFICANT CHANGE UP (ref 13–44)
MCHC RBC-ENTMCNC: 28.1 PG — SIGNIFICANT CHANGE UP (ref 27–34)
MCHC RBC-ENTMCNC: 31.5 GM/DL — LOW (ref 32–36)
MCV RBC AUTO: 89.3 FL — SIGNIFICANT CHANGE UP (ref 80–100)
METHADONE UR-MCNC: NEGATIVE — SIGNIFICANT CHANGE UP
MONOCYTES # BLD AUTO: 1.06 K/UL — HIGH (ref 0–0.9)
MONOCYTES NFR BLD AUTO: 12.9 % — SIGNIFICANT CHANGE UP (ref 2–14)
NEUTROPHILS # BLD AUTO: 3.83 K/UL — SIGNIFICANT CHANGE UP (ref 1.8–7.4)
NEUTROPHILS NFR BLD AUTO: 46.4 % — SIGNIFICANT CHANGE UP (ref 43–77)
NITRITE UR-MCNC: NEGATIVE — SIGNIFICANT CHANGE UP
NRBC # BLD: 0 /100 WBCS — SIGNIFICANT CHANGE UP (ref 0–0)
NT-PROBNP SERPL-SCNC: 2012 PG/ML — HIGH (ref 0–300)
OPIATES UR-MCNC: NEGATIVE — SIGNIFICANT CHANGE UP
OXYCODONE UR-MCNC: NEGATIVE — SIGNIFICANT CHANGE UP
PCO2 BLDV: 49 MMHG — HIGH (ref 39–42)
PCP SPEC-MCNC: SIGNIFICANT CHANGE UP
PCP UR-MCNC: NEGATIVE — SIGNIFICANT CHANGE UP
PH BLDV: 7.41 — SIGNIFICANT CHANGE UP (ref 7.32–7.43)
PH UR: 7.5 — SIGNIFICANT CHANGE UP (ref 5–8)
PLATELET # BLD AUTO: 219 K/UL — SIGNIFICANT CHANGE UP (ref 150–400)
PO2 BLDV: 31 MMHG — SIGNIFICANT CHANGE UP (ref 25–45)
POTASSIUM BLDV-SCNC: 4.9 MMOL/L — SIGNIFICANT CHANGE UP (ref 3.5–5.1)
POTASSIUM SERPL-MCNC: 3.9 MMOL/L — SIGNIFICANT CHANGE UP (ref 3.5–5.3)
POTASSIUM SERPL-SCNC: 3.9 MMOL/L — SIGNIFICANT CHANGE UP (ref 3.5–5.3)
PROT SERPL-MCNC: 6.4 G/DL — SIGNIFICANT CHANGE UP (ref 6–8.3)
PROT UR-MCNC: NEGATIVE — SIGNIFICANT CHANGE UP
PROTHROM AB SERPL-ACNC: 15.9 SEC — HIGH (ref 10.6–13.6)
RBC # BLD: 4.38 M/UL — SIGNIFICANT CHANGE UP (ref 3.8–5.2)
RBC # FLD: 14.1 % — SIGNIFICANT CHANGE UP (ref 10.3–14.5)
RBC CASTS # UR COMP ASSIST: 19 /HPF — HIGH (ref 0–4)
SAO2 % BLDV: 53.4 % — LOW (ref 67–88)
SARS-COV-2 RNA SPEC QL NAA+PROBE: SIGNIFICANT CHANGE UP
SODIUM SERPL-SCNC: 141 MMOL/L — SIGNIFICANT CHANGE UP (ref 135–145)
SP GR SPEC: 1.03 — HIGH (ref 1.01–1.02)
THC UR QL: NEGATIVE — SIGNIFICANT CHANGE UP
TROPONIN T, HIGH SENSITIVITY RESULT: 200 NG/L — HIGH (ref 0–51)
UROBILINOGEN FLD QL: NEGATIVE — SIGNIFICANT CHANGE UP
WBC # BLD: 8.24 K/UL — SIGNIFICANT CHANGE UP (ref 3.8–10.5)
WBC # FLD AUTO: 8.24 K/UL — SIGNIFICANT CHANGE UP (ref 3.8–10.5)
WBC UR QL: 1 /HPF — SIGNIFICANT CHANGE UP (ref 0–5)

## 2021-11-18 PROCEDURE — 70496 CT ANGIOGRAPHY HEAD: CPT | Mod: 26,MA

## 2021-11-18 PROCEDURE — 99285 EMERGENCY DEPT VISIT HI MDM: CPT | Mod: GC

## 2021-11-18 PROCEDURE — 71045 X-RAY EXAM CHEST 1 VIEW: CPT | Mod: 26

## 2021-11-18 PROCEDURE — 70498 CT ANGIOGRAPHY NECK: CPT | Mod: 26,MA

## 2021-11-18 PROCEDURE — 93010 ELECTROCARDIOGRAM REPORT: CPT

## 2021-11-18 PROCEDURE — 74177 CT ABD & PELVIS W/CONTRAST: CPT | Mod: 26,MA

## 2021-11-18 PROCEDURE — 0042T: CPT

## 2021-11-18 RX ORDER — FENTANYL CITRATE 50 UG/ML
1 INJECTION INTRAVENOUS
Refills: 0 | Status: DISCONTINUED | OUTPATIENT
Start: 2021-11-18 | End: 2021-11-25

## 2021-11-18 RX ORDER — BACLOFEN 100 %
10 POWDER (GRAM) MISCELLANEOUS
Refills: 0 | Status: DISCONTINUED | OUTPATIENT
Start: 2021-11-18 | End: 2021-11-29

## 2021-11-18 RX ORDER — CEFTRIAXONE 500 MG/1
1000 INJECTION, POWDER, FOR SOLUTION INTRAMUSCULAR; INTRAVENOUS ONCE
Refills: 0 | Status: COMPLETED | OUTPATIENT
Start: 2021-11-18 | End: 2021-11-18

## 2021-11-18 RX ORDER — DILTIAZEM HCL 120 MG
180 CAPSULE, EXT RELEASE 24 HR ORAL DAILY
Refills: 0 | Status: DISCONTINUED | OUTPATIENT
Start: 2021-11-18 | End: 2021-11-20

## 2021-11-18 RX ORDER — NALOXONE HYDROCHLORIDE 4 MG/.1ML
0.4 SPRAY NASAL ONCE
Refills: 0 | Status: COMPLETED | OUTPATIENT
Start: 2021-11-18 | End: 2021-11-18

## 2021-11-18 RX ORDER — ASPIRIN/CALCIUM CARB/MAGNESIUM 324 MG
81 TABLET ORAL DAILY
Refills: 0 | Status: DISCONTINUED | OUTPATIENT
Start: 2021-11-18 | End: 2021-11-29

## 2021-11-18 RX ORDER — ACETAMINOPHEN 500 MG
975 TABLET ORAL ONCE
Refills: 0 | Status: COMPLETED | OUTPATIENT
Start: 2021-11-18 | End: 2021-11-18

## 2021-11-18 RX ORDER — PANTOPRAZOLE SODIUM 20 MG/1
40 TABLET, DELAYED RELEASE ORAL
Refills: 0 | Status: DISCONTINUED | OUTPATIENT
Start: 2021-11-18 | End: 2021-11-29

## 2021-11-18 RX ORDER — RIVAROXABAN 15 MG-20MG
10 KIT ORAL DAILY
Refills: 0 | Status: DISCONTINUED | OUTPATIENT
Start: 2021-11-18 | End: 2021-11-29

## 2021-11-18 RX ORDER — FUROSEMIDE 40 MG
40 TABLET ORAL DAILY
Refills: 0 | Status: DISCONTINUED | OUTPATIENT
Start: 2021-11-18 | End: 2021-11-29

## 2021-11-18 RX ORDER — MIRTAZAPINE 45 MG/1
15 TABLET, ORALLY DISINTEGRATING ORAL AT BEDTIME
Refills: 0 | Status: DISCONTINUED | OUTPATIENT
Start: 2021-11-18 | End: 2021-11-29

## 2021-11-18 RX ORDER — HYDROMORPHONE HYDROCHLORIDE 2 MG/ML
2 INJECTION INTRAMUSCULAR; INTRAVENOUS; SUBCUTANEOUS
Refills: 0 | Status: DISCONTINUED | OUTPATIENT
Start: 2021-11-18 | End: 2021-11-25

## 2021-11-18 RX ORDER — DULOXETINE HYDROCHLORIDE 30 MG/1
60 CAPSULE, DELAYED RELEASE ORAL DAILY
Refills: 0 | Status: DISCONTINUED | OUTPATIENT
Start: 2021-11-18 | End: 2021-11-29

## 2021-11-18 RX ORDER — POLYETHYLENE GLYCOL 3350 17 G/17G
17 POWDER, FOR SOLUTION ORAL AT BEDTIME
Refills: 0 | Status: DISCONTINUED | OUTPATIENT
Start: 2021-11-18 | End: 2021-11-21

## 2021-11-18 RX ADMIN — CEFTRIAXONE 100 MILLIGRAM(S): 500 INJECTION, POWDER, FOR SOLUTION INTRAMUSCULAR; INTRAVENOUS at 16:38

## 2021-11-18 RX ADMIN — Medication 975 MILLIGRAM(S): at 19:25

## 2021-11-18 RX ADMIN — Medication 975 MILLIGRAM(S): at 22:47

## 2021-11-18 NOTE — ED PROVIDER NOTE - OBJECTIVE STATEMENT
79 yo f bilateral knee arthritis,  HTN, remote breast ca s/p mastectomy and LN resection, provoked PE 1993, chronic pain, presents with inability to get out of bed and a stroke code was called on her. Pt with daughter states that last known well was 1 am, and states at 3 am, pt woke up and was unable to get out of bed with her walker as she normally does. She did not respond to her family members, was able to follow commands, but denies any slurring speech, or one-sided weakness. Daughter denies any n/v/f/c, cp. palpitations. Stroke code showed no acute findings. 80 yo f bilateral knee arthritis,  HTN, remote breast ca s/p mastectomy and LN resection, provoked PE 1993, chronic pain, presents with inability to get out of bed and a stroke code was called on her. Pt with daughter states that last known well was 1 am, and states at 3 am, pt woke up and was unable to get out of bed with her walker as she normally does. She did not respond to her family members, was able to follow commands, but denies any slurring speech, or one-sided weakness. Daughter denies any n/v/f/c, cp. palpitations. Stroke code showed no acute findings.

## 2021-11-18 NOTE — ED ADULT NURSE NOTE - NSIMPLEMENTINTERV_GEN_ALL_ED
Implemented All Fall Risk Interventions:  [place] to call system. Call bell, personal items and telephone within reach. Instruct patient to call for assistance. Room bathroom lighting operational. Non-slip footwear when patient is off stretcher. Physically safe environment: no spills, clutter or unnecessary equipment. Stretcher in lowest position, wheels locked, appropriate side rails in place. Provide visual cue, wrist band, yellow gown, etc. Monitor gait and stability. Monitor for mental status changes and reorient to person, place, and time. Review medications for side effects contributing to fall risk. Reinforce activity limits and safety measures with patient and family.

## 2021-11-18 NOTE — ED PROVIDER NOTE - NSICDXPASTSURGICALHX_GEN_ALL_CORE_FT
PAST SURGICAL HISTORY:  Cataract     H/O  section     H/O mastectomy, right     H/O shoulder surgery     H/O: hysterectomy     History of hip replacement

## 2021-11-18 NOTE — ED PROVIDER NOTE - CLINICAL SUMMARY MEDICAL DECISION MAKING FREE TEXT BOX
80 yo f bilateral knee arthritis,  HTN, remote breast ca s/p mastectomy and LN resection, provoked PE 1993, chronic pain, presents with lethargy. Consider UTI. r/o mi, chf. labs, img, ecg, meds.

## 2021-11-18 NOTE — ED PROVIDER NOTE - NSICDXFAMILYHX_GEN_ALL_CORE_FT
FAMILY HISTORY:  Family history of emphysema, father -   Family history of lung cancer, mother -     Sibling  Still living? Yes, Estimated age: Age Unknown  Family history of breast cancer, Age at diagnosis: Age Unknown  Family history of Hodgkin's disease, Age at diagnosis: Age Unknown

## 2021-11-18 NOTE — ED ADULT NURSE NOTE - OBJECTIVE STATEMENT
PT is an 81 year old female who at baseline is walking with walker, talking, engaging in ADL's.  Pt's daughter noticed this morning at 2 am weakness and groaning and not talking.

## 2021-11-18 NOTE — CONSULT NOTE ADULT - ATTENDING COMMENTS
I have examined the pt at bedside.  The risks and the benefits of the proposed diagnostic/therapeutic approach were thoroughly discussed.   I agree with the above plan and have modified it where necessary.    82yo woman with Hx of breast ca, severe MSK issues, with baseline difficulty in gait (needs R foot prosthesis and walker).  Pt brought in by the daughter after episodes of weakness occurred the night before and the morning of admission.  On exam, pt oriented, cooperative, no lashay focal signs. Unable to walk due to MSK issues at this moment.    Impression is of a TME, possible UTI (received Ceftriaxone) or CHF exacerbation (high PBNP).  Reasonable to obtain imaging and EEG if pt remains symptomatic after appropriate medical management.

## 2021-11-18 NOTE — CONSULT NOTE ADULT - ASSESSMENT
Assessment:     Plan:      -Management & disposition to be discussed with neuro attending, Dr. Tinoco, and already discussed w/ fellow Dr. Bebeto Chamberlain Assessment: 81 year old RH female, PMH R breast cancer s/p mastectomy, HTN, scoliosis, R knee ?replacement on Xaralto presenting for AMS and generalized weakness, for which code stroke was called. LKN 1am when patient went to bed. At 2am, patient woke up to use bathroom. Patient needed help from daughter to be taken to bathroom, however had severe generalized weakness and was being unresponsive. Baseline AAOx3 and ambulates with cane. Last took Xaralto yesterday night. Not a candidate for tPA 2/2 out of window. Not candidate for endovascular 2/2 no LVO on CTA.     (Stroke only)  NIHSS: 27  MRS: 0-1    Plan:      -Management & disposition to be discussed with neuro attending, Dr. Tinoco, and already discussed w/ fellow Dr. Bebeto Chamberlain Assessment: 81 year old RH female, PMH R breast cancer s/p mastectomy, HTN, scoliosis, R knee ?replacement on Xaralto presenting for AMS and generalized weakness, for which code stroke was called. LKN 1am when patient went to bed. At 2am, patient woke up to use bathroom. Patient needed help from daughter to be taken to bathroom, however had severe generalized weakness and was being unresponsive. Baseline AAOx3 and ambulates with cane. Last took Xaralto yesterday night. Not a candidate for tPA 2/2 out of window. Not candidate for endovascular 2/2 no LVO on CTA. CT imaging showed no acute pathologies.      (Stroke only)  NIHSS: 27  MRS: 0-1    Plan:      -Management & disposition to be discussed with neuro attending, Dr. Tinoco, and already discussed w/ fellow Dr. Bebeto Chamberlain Assessment: 81 year old RH female, PMH R breast cancer s/p mastectomy, HTN, scoliosis, R knee ?replacement on Xaralto presenting for AMS and generalized weakness, for which code stroke was called. LKN 1am when patient went to bed. At 2am, patient woke up to use bathroom. Patient needed help from daughter to be taken to bathroom, however had severe generalized weakness and was being minimally responsive. Patient was then assisted back to bed and fell asleep. At At 10am, patient was trying be woken up by daughter and was not responding. EMS was then called and pt was brought to ED. Baseline AAOx3 and ambulates with cane. Last took Xaralto yesterday night. Not a candidate for tPA 2/2 out of window. Not candidate for endovascular 2/2 no LVO on CTA. CT imaging showed no acute pathologies. Exam limited. Eyes forced shut. Nonverbal. Grimaces to noxious stimuli. Unclear etiology of AMS. Would investigate infectious and metabolic etiologies. Low suspicion for seizure or CVA.     (Stroke only)  NIHSS: 27  MRS: 0-    Plan:  []Infectious/metabolic per ED  []Consider rEEG  []MRI brain w/ w/o  []Case to be discussed w/ neuro attending    -Management & disposition to be discussed with neuro attending, Dr. Orellana, and already discussed w/ fellow Dr. Bebeto Chamberlain Assessment: 81 year old RH female, PMH R breast cancer s/p mastectomy, HTN, scoliosis, R knee ?replacement on Xaralto presenting for AMS and generalized weakness, for which code stroke was called. LKN 1am when patient went to bed. At 2am, patient woke up to use bathroom. Patient needed help from daughter to be taken to bathroom, however had severe generalized weakness and was being minimally responsive. Patient was then assisted back to bed and fell asleep. At At 10am, patient was trying be woken up by daughter and was not responding. EMS was then called and pt was brought to ED. Baseline AAOx3 and ambulates with cane. Last took Xaralto yesterday night. Not a candidate for tPA 2/2 out of window. Not candidate for endovascular 2/2 no LVO on CTA. CT imaging showed no acute pathologies. Exam limited. Eyes forced shut. Nonverbal. Grimaces to noxious stimuli. Unclear etiology of AMS. Would investigate infectious and metabolic etiologies. Low suspicion for seizure or CVA.     (Stroke only)  NIHSS: 27  MRS: 0-    Plan:  []Infectious/metabolic per ED  []Consider rEEG, can be done outpatient  []Case to be discussed w/ neuro attending    -Management & disposition to be discussed with neuro attending, Dr. Orellana, and already discussed w/ fellow Dr. Bebeto Chamberlain

## 2021-11-18 NOTE — H&P ADULT - HISTORY OF PRESENT ILLNESS
CHIEF COMPLAINT:Patient is a 81y old  Female who presents with a chief complaint of     HPI:  82 yo f bilateral knee arthritis,  HTN, remote breast ca s/p mastectomy and LN resection, provoked PE , chronic pain, presents with inability to get out of bed and a stroke code was called on her. Pt with daughter states that last known well was 1 am, and states at 3 am, pt woke up and was unable to get out of bed with her walker as she normally does. She did not respond to her family members, was able to follow commands, but denies any slurring speech, or one-sided weakness. Daughter denies any n/v/f/c, cp. palpitations. Stroke code showed no acute findings.  pt apparently had disorientation and lethargy since early thi am , and no tresponding to verbal stimuli and being disoriented.  pt daughter claims as she was given lyrica, she has had moments of lethargy.  pt awake and alert now in ER.    PAST MEDICAL & SURGICAL HISTORY:  HTN (hypertension)    Scoliosis    Breast CA    H/O  section    History of hip replacement    H/O mastectomy, right    H/O: hysterectomy    Cataract    H/O shoulder surgery        MEDICATIONS  (STANDING):  acetaminophen     Tablet .. 975 milliGRAM(s) Oral once    MEDICATIONS  (PRN):      FAMILY HISTORY:  Family history of breast cancer (Sibling)  sister living    Family history of Hodgkin&#x27;s disease (Sibling)  sisters - living    Family history of emphysema  father -     Family history of lung cancer  mother -         SOCIAL HISTORY:    [ x] Non-smoker  [ ] Smoker  [ ] Alcohol    Allergies    No Known Allergies    Intolerances    	    REVIEW OF SYSTEMS:  CONSTITUTIONAL: No fever, weight loss, or fatigue  EYES: No eye pain, visual disturbances, or discharge  ENT:  No difficulty hearing, tinnitus, vertigo; No sinus or throat pain  NECK: No pain or stiffness  RESPIRATORY: No cough, wheezing, chills or hemoptysis; No Shortness of Breath  CARDIOVASCULAR: No chest pain, palpitations, passing out, dizziness, or leg swelling  GASTROINTESTINAL: No abdominal or epigastric pain. No nausea, vomiting, or hematemesis; No diarrhea or constipation. No melena or hematochezia.  GENITOURINARY: No dysuria, frequency, hematuria, or incontinence  NEUROLOGICAL: No headaches, memory loss, loss of strength, numbness, or tremors  SKIN: No itching, burning, rashes, or lesions   LYMPH Nodes: No enlarged glands  ENDOCRINE: No heat or cold intolerance; No hair loss  MUSCULOSKELETAL: No joint pain or swelling; No muscle, back, or extremity pain  PSYCHIATRIC: No depression, anxiety, mood swings, or difficulty sleeping  HEME/LYMPH: No easy bruising, or bleeding gums  ALLERGY AND IMMUNOLOGIC: No hives or eczema	    [x ] All others negative	  [ ] Unable to obtain    PHYSICAL EXAM:  T(C): 36.7 (21 @ 11:24), Max: 36.7 (21 @ 11:24)  HR: 70 (21 @ 17:57) (67 - 71)  BP: 114/70 (21 @ 17:57) (113/66 - 170/90)  RR: 18 (21 @ 17:57) (15 - 18)  SpO2: 93% (21 @ 17:57) (84% - 98%)  Wt(kg): --  I&O's Summary      Appearance: Normal	  HEENT:   Normal oral mucosa, PERRL, EOMI	  Lymphatic: No lymphadenopathy  Cardiovascular: Normal S1 S2, No JVD, + murmurs, No edema  Respiratory: Lungs clear to auscultation	  Psychiatry: A & O x 3, Mood & affect appropriate  Gastrointestinal:  Soft, Non-tender, + BS	  Skin: No rashes, No ecchymoses, No cyanosis	  Neurologic: Non-focal  Extremities: Normal range of motion, No clubbing, cyanosis or edema  Vascular: Peripheral pulses palpable 2+ bilaterally    TELEMETRY: 	    ECG:  	  RADIOLOGY:  OTHER: 	  	  LABS:	 	    CARDIAC MARKERS:  CARDIAC MARKERS ( 2021 14:58 )  x     / x     / 62 U/L / x     / 3.7 ng/mL  CARDIAC MARKERS ( 2021 11:38 )  x     / x     / 90 U/L / x     / 6.8 ng/mL                              12.3   8.24  )-----------( 219      ( 2021 11:38 )             39.1         141  |  104  |  21  ----------------------------<  94  3.9   |  26  |  1.22    Ca    9.0      2021 11:38    TPro  6.4  /  Alb  3.4  /  TBili  0.5  /  DBili  x   /  AST  21  /  ALT  8<L>  /  AlkPhos  71      proBNP: Serum Pro-Brain Natriuretic Peptide: 2012 pg/mL ( @ 14:58)  Serum Pro-Brain Natriuretic Peptide: 4144 pg/mL ( @ 11:38)    Lipid Profile:   HgA1c:   TSH:   PT/INR - ( 2021 11:38 )   PT: 15.9 sec;   INR: 1.34 ratio         PTT - ( 2021 11:38 )  PTT:28.0 sec    PREVIOUS DIAGNOSTIC TESTING:    < from: 12 Lead ECG (18 @ 09:27) >  Diagnosis Line NORMAL SINUS RHYTHM  NORMAL ECG  < from: CT Abdomen and Pelvis w/ IV Cont (21 @ 16:34) >  Mild to moderate left hydronephrosis with upper pelvic ureteral calculus  Right lower lobe nodule. Suggest follow-up in one year    < from: Xray Chest 1 View- PORTABLE-Urgent (21 @ 12:23) >  Clear lungs.    < from: CT Angio Neck w/ IV Cont (21 @ 12:07) >  IMPRESSION: Age-appropriate involutional and ischemic gliotic changes progressed since 2010. No hemorrhage. Normal CTA of the head and neck. Normal CT perfusion.

## 2021-11-18 NOTE — ED PROVIDER NOTE - ATTENDING CONTRIBUTION TO CARE
I, Bhavin Castañeda, performed a history and physical exam of the patient and discussed their management with the resident and /or advanced care provider. I reviewed the resident and /or ACP's note and agree with the documented findings and plan of care. I was present and available for all procedures.  Patient presented with AMS and stroke code was called. CT without signs of acute stroke and pt without focal deficits.  Patient AMS likely due to opioid intoxication vs UTI. However, elevated troponin of 200 discovered without ischemic changes on the EKG. Patient also responding to tenderness diffusely on abdominal exam.  Will evaluate ABd CT. Cardiology consult requested for elevated troponin. Patient became more responsive during ED stay. Narcan was ordered to help diagnose AMS as it was not required for respiratory depression - patient was responding to commands at the time of attempted Narcan IV which the daughter refused adamantly reporting she provides all of the analgesics. Considering narcan was diagnostic and not resuscitative the medication was deferred as patient mental status was improving.  Patient signed-out to Dr. Rodriguez to f/u U/A which was empirically treated for UTI, and CT. Patient to be admitted to Dr. Jaramillo if CT is without acute surgical pathology.

## 2021-11-18 NOTE — H&P ADULT - ASSESSMENT
80 yo f bilateral knee arthritis,  HTN, remote breast ca s/p mastectomy and LN resection, provoked PE 1993, chronic pain, presents with inability to get out of bed and a stroke code was called on her. Pt with daughter states that last known well was 1 am, and states at 3 am, pt woke up and was unable to get out of bed with her walker as she normally does. She did not respond to her family members, was able to follow commands, but denies any slurring speech, or one-sided weakness. Daughter denies any n/v/f/c, cp. palpitations. Stroke code showed no acute findings.  pt apparently had disorientation and lethargy since early thi am , and no responding  to verbal stimuli and being disoriented.  pt daughter claims as she was given Lyrica she has had moments of lethargy.  pt awake and alert now in ER.  change of mental status seen by neuro  r/o seizure disorder check EEG  continue all pain meds, hold Lyrica  pain consult  continue ac  check labs  urology  eval

## 2021-11-18 NOTE — CONSULT NOTE ADULT - SUBJECTIVE AND OBJECTIVE BOX
HPI:    (Stroke only)  NIHSS: 27  MRS: 0-1    REVIEW OF SYSTEMS  Per HPI    PAST MEDICAL & SURGICAL HISTORY:  HTN (hypertension)    Scoliosis    Breast CA    H/O  section    History of hip replacement    H/O mastectomy, right    H/O: hysterectomy    Cataract    H/O shoulder surgery      FAMILY HISTORY:  Family history of breast cancer (Sibling)  sister living    Family history of Hodgkin&#x27;s disease (Sibling)  sisters - living    Family history of emphysema  father -     Family history of lung cancer  mother -       SOCIAL HISTORY:   T/E/D:   Occupation:   Lives with:     MEDICATIONS (HOME):  Home Medications:  acetaminophen 325 mg oral tablet: 2 tab(s) orally every 6 hours, As needed, Temp greater or equal to 38C (100.4F), Mild Pain (1 - 3) (26 Dec 2018 07:37)  Amitiza: 24 microgram(s) orally 2 times a day (09 Aug 2017 01:35)  aspirin 81 mg oral delayed release capsule: 1 tab(s) orally once a day (09 Aug 2017 01:36)  baclofen: 10  orally once a day (09 Aug 2017 01:35)  Cartia XT: 180 milligram(s) orally once a day (09 Aug 2017 01:26)  cevimeline: 30 milligram(s) orally 3 times a day (09 Aug 2017 01:34)  docusate sodium 100 mg oral capsule: 1 cap(s) orally 3 times a day (26 Dec 2018 07:37)  DULoxetine: 60 milligram(s) orally once a day (09 Aug 2017 01:31)  fentaNYL 50 mcg/hr transdermal film, extended release: 1 patch transdermal every 72 hours (26 Dec 2018 07:37)  furosemide: 40 milligram(s) orally once a day (09 Aug 2017 01:34)  HYDROmorphone 2 mg oral tablet: 1 tab(s) orally every 4 hours, As needed, Moderate Pain (4 - 6) (26 Dec 2018 07:37)  omeprazole: 20 milligram(s) orally once a day (09 Aug 2017 01:35)  oxyCODONE 10 mg oral tablet: 1 tab(s) orally 5 times a day, As Needed (09 Aug 2017 01:26)  polyethylene glycol 3350 oral powder for reconstitution: 17 gram(s) orally once a day (at bedtime) (26 Dec 2018 07:37)  rivaroxaban 10 mg oral tablet: 1 tab(s) orally once a day (26 Dec 2018 07:37)  traMADol 50 mg oral tablet: 1 tab(s) orally every 6 hours, As needed, Mild Pain (1 - 3) (26 Dec 2018 07:37)  traZODone: 50 milligram(s) orally once a day (at bedtime) (09 Aug 2017 01:34)    MEDICATIONS  (STANDING):    MEDICATIONS  (PRN):    ALLERGIES/INTOLERANCES:  Allergies  No Known Allergies    Intolerances    VITALS & EXAMINATION:  Vital Signs Last 24 Hrs  T(C): 36.7 (2021 11:24), Max: 36.7 (2021 11:24)  T(F): 98 (2021 11:24), Max: 98 (2021 11:24)  HR: 67 (2021 11:24) (67 - 67)  BP: 113/66 (2021 11:24) (113/66 - 113/66)  BP(mean): --  RR: 15 (2021 11:24) (15 - 15)  SpO2: 84% (2021 11:24) (84% - 84%)    General:  Constitutional: Female, appears stated age, in no apparent distress including pain  Head: Normocephalic & atraumatic.    Neurological (>12):  MS: Not alert. Does not attend to repeated verbal stimulation.     Language: Nonverbal    CNs: PERRLA (R = 3mm, L = 3mm). Unable to test VFF or EOM. No facial asymmetry b/l.  Gag reflex deferred.Tongue midline, normal movements, no atrophy.    Motor: Normal muscle bulk & tone. No noticeable tremor or seizure. No spontaneous movement in the extremities.     Sensation: Grimaces to noxious stimuli    Cortical: Extinction on DSS (neglect): none    Coordination: Unable to assess    Gait: Deferred    LABORATORY:  CBC                       12.3   8.24  )-----------( 219      ( 2021 11:38 )             39.1     Chem       LFTs   Coagulopathy PT/INR - ( 2021 11:38 )   PT: 15.9 sec;   INR: 1.34 ratio         PTT - ( 2021 11:38 )  PTT:28.0 sec  Lipid Panel   A1c   Cardiac enzymes     U/A   CSF  Immunological  Other    STUDIES & IMAGING:  Studies (EKG, EEG, EMG, etc):     Radiology (XR, CT, MR, U/S, TTE/WINSTON):   HPI: 81 year old RH female, PMH R breast cancer s/p mastectomy, HTN, scoliosis, R knee ?replacement on Xaralto presenting for AMS and generalized weakness, for which code stroke was called. LKN 1am when patient went to bed. At 2am, patient woke up to use bathroom. Patient needed help from daughter to be taken to bathroom, however had severe generalized weakness and was being unresponsive. Baseline AAOx3 and ambulates with cane. Last took Xaralto yesterday night. Not a candidate for tPA 2/2 out of window. Not candidate for endovascular 2/2 no LVO on CTA.     (Stroke only)  NIHSS: 27  MRS: 0-1    REVIEW OF SYSTEMS  Per HPI    PAST MEDICAL & SURGICAL HISTORY:  HTN (hypertension)    Scoliosis    Breast CA    H/O  section    History of hip replacement    H/O mastectomy, right    H/O: hysterectomy    Cataract    H/O shoulder surgery      FAMILY HISTORY:  Family history of breast cancer (Sibling)  sister living    Family history of Hodgkin&#x27;s disease (Sibling)  sisters - living    Family history of emphysema  father -     Family history of lung cancer  mother -       SOCIAL HISTORY:   T/E/D:   Occupation:   Lives with:     MEDICATIONS (HOME):  Home Medications:  acetaminophen 325 mg oral tablet: 2 tab(s) orally every 6 hours, As needed, Temp greater or equal to 38C (100.4F), Mild Pain (1 - 3) (26 Dec 2018 07:37)  Amitiza: 24 microgram(s) orally 2 times a day (09 Aug 2017 01:35)  aspirin 81 mg oral delayed release capsule: 1 tab(s) orally once a day (09 Aug 2017 01:36)  baclofen: 10  orally once a day (09 Aug 2017 01:35)  Cartia XT: 180 milligram(s) orally once a day (09 Aug 2017 01:26)  cevimeline: 30 milligram(s) orally 3 times a day (09 Aug 2017 01:34)  docusate sodium 100 mg oral capsule: 1 cap(s) orally 3 times a day (26 Dec 2018 07:37)  DULoxetine: 60 milligram(s) orally once a day (09 Aug 2017 01:31)  fentaNYL 50 mcg/hr transdermal film, extended release: 1 patch transdermal every 72 hours (26 Dec 2018 07:37)  furosemide: 40 milligram(s) orally once a day (09 Aug 2017 01:34)  HYDROmorphone 2 mg oral tablet: 1 tab(s) orally every 4 hours, As needed, Moderate Pain (4 - 6) (26 Dec 2018 07:37)  omeprazole: 20 milligram(s) orally once a day (09 Aug 2017 01:35)  oxyCODONE 10 mg oral tablet: 1 tab(s) orally 5 times a day, As Needed (09 Aug 2017 01:26)  polyethylene glycol 3350 oral powder for reconstitution: 17 gram(s) orally once a day (at bedtime) (26 Dec 2018 07:37)  rivaroxaban 10 mg oral tablet: 1 tab(s) orally once a day (26 Dec 2018 07:37)  traMADol 50 mg oral tablet: 1 tab(s) orally every 6 hours, As needed, Mild Pain (1 - 3) (26 Dec 2018 07:37)  traZODone: 50 milligram(s) orally once a day (at bedtime) (09 Aug 2017 01:34)    MEDICATIONS  (STANDING):    MEDICATIONS  (PRN):    ALLERGIES/INTOLERANCES:  Allergies  No Known Allergies    Intolerances    VITALS & EXAMINATION:  Vital Signs Last 24 Hrs  T(C): 36.7 (2021 11:24), Max: 36.7 (2021 11:24)  T(F): 98 (2021 11:24), Max: 98 (2021 11:24)  HR: 67 (2021 11:24) (67 - 67)  BP: 113/66 (2021 11:24) (113/66 - 113/66)  BP(mean): --  RR: 15 (2021 11:24) (15 - 15)  SpO2: 84% (2021 11:24) (84% - 84%)    General:  Constitutional: Female, appears stated age, in no apparent distress including pain  Head: Normocephalic & atraumatic.    Neurological (>12):  MS: Not alert. Does not attend to repeated verbal stimulation.     Language: Nonverbal    CNs: PERRLA (R = 3mm, L = 3mm). Unable to test VFF or EOM. No facial asymmetry b/l.  Gag reflex deferred.Tongue midline, normal movements, no atrophy.    Motor: Normal muscle bulk & tone. No noticeable tremor or seizure. No spontaneous movement in the extremities.     Sensation: Grimaces to noxious stimuli    Cortical: Extinction on DSS (neglect): none    Coordination: Unable to assess    Gait: Deferred    LABORATORY:  CBC                       12.3   8.24  )-----------( 219      ( 2021 11:38 )             39.1     Chem       LFTs   Coagulopathy PT/INR - ( 2021 11:38 )   PT: 15.9 sec;   INR: 1.34 ratio         PTT - ( 2021 11:38 )  PTT:28.0 sec  Lipid Panel   A1c   Cardiac enzymes     U/A   CSF  Immunological  Other    STUDIES & IMAGING:  Studies (EKG, EEG, EMG, etc):     Radiology (XR, CT, MR, U/S, TTE/WINSTON):   HPI: 81 year old RH female, PMH R breast cancer s/p mastectomy, HTN, scoliosis, R knee ?replacement on Xaralto presenting for AMS and generalized weakness, for which code stroke was called. LKN 1am when patient went to bed. At 2am, patient woke up to use bathroom. Patient needed help from daughter to be taken to bathroom, however had severe generalized weakness and was being unresponsive. Baseline AAOx3 and ambulates with cane. Last took Xaralto yesterday night. Not a candidate for tPA 2/2 out of window. Not candidate for endovascular 2/2 no LVO on CTA.     (Stroke only)  NIHSS: 27  MRS: 0-1    REVIEW OF SYSTEMS  Per HPI    PAST MEDICAL & SURGICAL HISTORY:  HTN (hypertension)    Scoliosis    Breast CA    H/O  section    History of hip replacement    H/O mastectomy, right    H/O: hysterectomy    Cataract    H/O shoulder surgery      FAMILY HISTORY:  Family history of breast cancer (Sibling)  sister living    Family history of Hodgkin&#x27;s disease (Sibling)  sisters - living    Family history of emphysema  father -     Family history of lung cancer  mother -       SOCIAL HISTORY:   T/E/D:   Occupation:   Lives with:     MEDICATIONS (HOME):  Home Medications:  acetaminophen 325 mg oral tablet: 2 tab(s) orally every 6 hours, As needed, Temp greater or equal to 38C (100.4F), Mild Pain (1 - 3) (26 Dec 2018 07:37)  Amitiza: 24 microgram(s) orally 2 times a day (09 Aug 2017 01:35)  aspirin 81 mg oral delayed release capsule: 1 tab(s) orally once a day (09 Aug 2017 01:36)  baclofen: 10  orally once a day (09 Aug 2017 01:35)  Cartia XT: 180 milligram(s) orally once a day (09 Aug 2017 01:26)  cevimeline: 30 milligram(s) orally 3 times a day (09 Aug 2017 01:34)  docusate sodium 100 mg oral capsule: 1 cap(s) orally 3 times a day (26 Dec 2018 07:37)  DULoxetine: 60 milligram(s) orally once a day (09 Aug 2017 01:31)  fentaNYL 50 mcg/hr transdermal film, extended release: 1 patch transdermal every 72 hours (26 Dec 2018 07:37)  furosemide: 40 milligram(s) orally once a day (09 Aug 2017 01:34)  HYDROmorphone 2 mg oral tablet: 1 tab(s) orally every 4 hours, As needed, Moderate Pain (4 - 6) (26 Dec 2018 07:37)  omeprazole: 20 milligram(s) orally once a day (09 Aug 2017 01:35)  oxyCODONE 10 mg oral tablet: 1 tab(s) orally 5 times a day, As Needed (09 Aug 2017 01:26)  polyethylene glycol 3350 oral powder for reconstitution: 17 gram(s) orally once a day (at bedtime) (26 Dec 2018 07:37)  rivaroxaban 10 mg oral tablet: 1 tab(s) orally once a day (26 Dec 2018 07:37)  traMADol 50 mg oral tablet: 1 tab(s) orally every 6 hours, As needed, Mild Pain (1 - 3) (26 Dec 2018 07:37)  traZODone: 50 milligram(s) orally once a day (at bedtime) (09 Aug 2017 01:34)    MEDICATIONS  (STANDING):    MEDICATIONS  (PRN):    ALLERGIES/INTOLERANCES:  Allergies  No Known Allergies    Intolerances    VITALS & EXAMINATION:  Vital Signs Last 24 Hrs  T(C): 36.7 (2021 11:24), Max: 36.7 (2021 11:24)  T(F): 98 (2021 11:24), Max: 98 (2021 11:24)  HR: 67 (2021 11:24) (67 - 67)  BP: 113/66 (2021 11:24) (113/66 - 113/66)  BP(mean): --  RR: 15 (2021 11:24) (15 - 15)  SpO2: 84% (2021 11:24) (84% - 84%)    General:  Constitutional: Female, appears stated age, in no apparent distress including pain  Head: Normocephalic & atraumatic.    Neurological (>12):  MS: Not alert. Does not attend to repeated verbal stimulation.     Language: Nonverbal    CNs: PERRLA (R = 3mm, L = 3mm). Unable to test VFF or EOM. No facial asymmetry b/l.  Gag reflex deferred.Tongue midline, normal movements, no atrophy.    Motor: Normal muscle bulk & tone. No noticeable tremor or seizure. No spontaneous movement in the extremities.     Sensation: Grimaces to noxious stimuli    Cortical: Extinction on DSS (neglect): none    Coordination: Unable to assess    Gait: Deferred    LABORATORY:  CBC                       12.3   8.24  )-----------( 219      ( 2021 11:38 )             39.1     Chem       LFTs   Coagulopathy PT/INR - ( 2021 11:38 )   PT: 15.9 sec;   INR: 1.34 ratio         PTT - ( 2021 11:38 )  PTT:28.0 sec  Lipid Panel   A1c   Cardiac enzymes     U/A   CSF  Immunological  Other    STUDIES & IMAGING:  Studies (EKG, EEG, EMG, etc):     Radiology (XR, CT, MR, U/S, TTE/WINSTON):  CT imaging: Age-appropriate involutional and ischemic gliotic changes progressed since 2010. No hemorrhage. Normal CTA of the head and neck. Normal CT perfusion. HPI: 81 year old RH female, PMH R breast cancer s/p mastectomy, HTN, scoliosis, R knee ?replacement on Xaralto presenting for AMS and generalized weakness, for which code stroke was called. LKN 1am when patient went to bed. At 2am, patient woke up to use bathroom. Patient needed help from daughter to be taken to bathroom, however had severe generalized weakness and was being minimally responsive. Patient was then assisted back to bed and fell asleep. At At 10am, patient was trying be woken up by daughter and was not responding. EMS was then called and pt was brought to ED. Baseline AAOx3 and ambulates with cane. Last took Xaralto yesterday night. Not a candidate for tPA 2/2 out of window. Not candidate for endovascular 2/2 no LVO on CTA.     (Stroke only)  NIHSS: 27  MRS: 0-1    REVIEW OF SYSTEMS  Per HPI    PAST MEDICAL & SURGICAL HISTORY:  HTN (hypertension)    Scoliosis    Breast CA    H/O  section    History of hip replacement    H/O mastectomy, right    H/O: hysterectomy    Cataract    H/O shoulder surgery      FAMILY HISTORY:  Family history of breast cancer (Sibling)  sister living    Family history of Hodgkin&#x27;s disease (Sibling)  sisters - living    Family history of emphysema  father -     Family history of lung cancer  mother -       SOCIAL HISTORY:   T/E/D:   Occupation:   Lives with:     MEDICATIONS (HOME):  Home Medications:  acetaminophen 325 mg oral tablet: 2 tab(s) orally every 6 hours, As needed, Temp greater or equal to 38C (100.4F), Mild Pain (1 - 3) (26 Dec 2018 07:37)  Amitiza: 24 microgram(s) orally 2 times a day (09 Aug 2017 01:35)  aspirin 81 mg oral delayed release capsule: 1 tab(s) orally once a day (09 Aug 2017 01:36)  baclofen: 10  orally once a day (09 Aug 2017 01:35)  Cartia XT: 180 milligram(s) orally once a day (09 Aug 2017 01:26)  cevimeline: 30 milligram(s) orally 3 times a day (09 Aug 2017 01:34)  docusate sodium 100 mg oral capsule: 1 cap(s) orally 3 times a day (26 Dec 2018 07:37)  DULoxetine: 60 milligram(s) orally once a day (09 Aug 2017 01:31)  fentaNYL 50 mcg/hr transdermal film, extended release: 1 patch transdermal every 72 hours (26 Dec 2018 07:37)  furosemide: 40 milligram(s) orally once a day (09 Aug 2017 01:34)  HYDROmorphone 2 mg oral tablet: 1 tab(s) orally every 4 hours, As needed, Moderate Pain (4 - 6) (26 Dec 2018 07:37)  omeprazole: 20 milligram(s) orally once a day (09 Aug 2017 01:35)  oxyCODONE 10 mg oral tablet: 1 tab(s) orally 5 times a day, As Needed (09 Aug 2017 01:26)  polyethylene glycol 3350 oral powder for reconstitution: 17 gram(s) orally once a day (at bedtime) (26 Dec 2018 07:37)  rivaroxaban 10 mg oral tablet: 1 tab(s) orally once a day (26 Dec 2018 07:37)  traMADol 50 mg oral tablet: 1 tab(s) orally every 6 hours, As needed, Mild Pain (1 - 3) (26 Dec 2018 07:37)  traZODone: 50 milligram(s) orally once a day (at bedtime) (09 Aug 2017 01:34)    MEDICATIONS  (STANDING):    MEDICATIONS  (PRN):    ALLERGIES/INTOLERANCES:  Allergies  No Known Allergies    Intolerances    VITALS & EXAMINATION:  Vital Signs Last 24 Hrs  T(C): 36.7 (2021 11:24), Max: 36.7 (2021 11:24)  T(F): 98 (2021 11:24), Max: 98 (2021 11:24)  HR: 67 (2021 11:24) (67 - 67)  BP: 113/66 (2021 11:24) (113/66 - 113/66)  BP(mean): --  RR: 15 (2021 11:24) (15 - 15)  SpO2: 84% (2021 11:24) (84% - 84%)    General:  Constitutional: Female, appears stated age, in no apparent distress including pain  Head: Normocephalic & atraumatic.    Neurological (>12):  MS: Not alert. Does not attend to repeated verbal stimulation.     Language: Nonverbal    CNs: PERRLA (R = 3mm, L = 3mm). Unable to test VFF or EOM. No facial asymmetry b/l.  Gag reflex deferred.Tongue midline, normal movements, no atrophy.    Motor: Normal muscle bulk & tone. No noticeable tremor or seizure. No spontaneous movement in the extremities.     Sensation: Grimaces to noxious stimuli    Cortical: Extinction on DSS (neglect): none    Coordination: Unable to assess    Gait: Deferred    LABORATORY:  CBC                       12.3   8.24  )-----------( 219      ( 2021 11:38 )             39.1     Chem       LFTs   Coagulopathy PT/INR - ( 2021 11:38 )   PT: 15.9 sec;   INR: 1.34 ratio         PTT - ( 2021 11:38 )  PTT:28.0 sec  Lipid Panel   A1c   Cardiac enzymes     U/A   CSF  Immunological  Other    STUDIES & IMAGING:  Studies (EKG, EEG, EMG, etc):     Radiology (XR, CT, MR, U/S, TTE/WINSTON):  CT imaging: Age-appropriate involutional and ischemic gliotic changes progressed since 2010. No hemorrhage. Normal CTA of the head and neck. Normal CT perfusion.

## 2021-11-18 NOTE — ED PROVIDER NOTE - PHYSICAL EXAMINATION
Const: THin, unkempt  Eyes: no conjunctival injection, and symmetrical lids.  HEENT: Head NCAT, no lesions. Atraumatic external nose and ears. Moist MM.  Neck: Symmetric, trachea midline.   CVS: +S1/S2,   RESP: Unlabored respiratory effort. Clear to auscultation bilaterally.  GI: Nondistended  MSK: Normocephalic/Atraumatic, Lower Extremities w/o calf tenderness or edema b/l.   Skin: Warm, dry and intact.   Neuro: cannot asses as pt is unresonsive  Psych: Awake, Alert, & Oriented (AAO) x0 Const: THin, unkempt  Eyes: no conjunctival injection, and symmetrical lids.  HEENT: Head NCAT, no lesions. Atraumatic external nose and ears. Moist MM.  Neck: Symmetric, trachea midline.   CVS: +S1/S2,   RESP: Unlabored respiratory effort. Clear to auscultation bilaterally.  GI: Nondistended  MSK: Normocephalic/Atraumatic, Lower Extremities w/o calf tenderness or edema b/l.   Skin: Warm, dry and intact.   Neuro: cannot asses as pt is unresponsive  Psych: Awake, Alert, & Oriented (AAO) x0

## 2021-11-18 NOTE — ED PROVIDER NOTE - PROGRESS NOTE DETAILS
Sherman Alfred, PGY1   Stroke code found no acute findings.   Spoke with Dr barrow cardiology who ordered some labs and requested naloxone as pt pain meds have been increasing. Sherman Alfred, PGY1   PT daughter refuses naloxone stating she does not want her mom taking it as she gives her mother her meds and she did not give her more than necessary. It was discussed with daughter that there are many causes for increased Sherman Alfred, PGY1   PT daughter refuses naloxone stating she does not want her mom taking it as she gives her mother her meds and she did not give her more than necessary. Discussed with daughter causes for decxreaed opiate thresholds, but continues to deny Sherman Alfred, PGY1 Stone found with mild hydro. discussed with Dr barrow. will admit

## 2021-11-19 DIAGNOSIS — F05 DELIRIUM DUE TO KNOWN PHYSIOLOGICAL CONDITION: ICD-10-CM

## 2021-11-19 LAB
ALBUMIN SERPL ELPH-MCNC: 3.7 G/DL — SIGNIFICANT CHANGE UP (ref 3.3–5)
ALP SERPL-CCNC: 71 U/L — SIGNIFICANT CHANGE UP (ref 40–120)
ALT FLD-CCNC: 9 U/L — LOW (ref 10–45)
ANION GAP SERPL CALC-SCNC: 15 MMOL/L — SIGNIFICANT CHANGE UP (ref 5–17)
AST SERPL-CCNC: 25 U/L — SIGNIFICANT CHANGE UP (ref 10–40)
BILIRUB SERPL-MCNC: 0.8 MG/DL — SIGNIFICANT CHANGE UP (ref 0.2–1.2)
BUN SERPL-MCNC: 20 MG/DL — SIGNIFICANT CHANGE UP (ref 7–23)
CALCIUM SERPL-MCNC: 9.2 MG/DL — SIGNIFICANT CHANGE UP (ref 8.4–10.5)
CHLORIDE SERPL-SCNC: 100 MMOL/L — SIGNIFICANT CHANGE UP (ref 96–108)
CO2 SERPL-SCNC: 24 MMOL/L — SIGNIFICANT CHANGE UP (ref 22–31)
COVID-19 NUCLEOCAPSID GAM AB INTERP: NEGATIVE — SIGNIFICANT CHANGE UP
COVID-19 NUCLEOCAPSID TOTAL GAM ANTIBODY RESULT: 0.07 INDEX — SIGNIFICANT CHANGE UP
CREAT SERPL-MCNC: 1.14 MG/DL — SIGNIFICANT CHANGE UP (ref 0.5–1.3)
CULTURE RESULTS: SIGNIFICANT CHANGE UP
GLUCOSE SERPL-MCNC: 154 MG/DL — HIGH (ref 70–99)
HCT VFR BLD CALC: 40.2 % — SIGNIFICANT CHANGE UP (ref 34.5–45)
HGB BLD-MCNC: 13 G/DL — SIGNIFICANT CHANGE UP (ref 11.5–15.5)
MCHC RBC-ENTMCNC: 28 PG — SIGNIFICANT CHANGE UP (ref 27–34)
MCHC RBC-ENTMCNC: 32.3 GM/DL — SIGNIFICANT CHANGE UP (ref 32–36)
MCV RBC AUTO: 86.6 FL — SIGNIFICANT CHANGE UP (ref 80–100)
NRBC # BLD: 0 /100 WBCS — SIGNIFICANT CHANGE UP (ref 0–0)
PLATELET # BLD AUTO: 249 K/UL — SIGNIFICANT CHANGE UP (ref 150–400)
POTASSIUM SERPL-MCNC: 3.6 MMOL/L — SIGNIFICANT CHANGE UP (ref 3.5–5.3)
POTASSIUM SERPL-SCNC: 3.6 MMOL/L — SIGNIFICANT CHANGE UP (ref 3.5–5.3)
PROT SERPL-MCNC: 6.9 G/DL — SIGNIFICANT CHANGE UP (ref 6–8.3)
RBC # BLD: 4.64 M/UL — SIGNIFICANT CHANGE UP (ref 3.8–5.2)
RBC # FLD: 14.2 % — SIGNIFICANT CHANGE UP (ref 10.3–14.5)
SARS-COV-2 IGG+IGM SERPL QL IA: 0.07 INDEX — SIGNIFICANT CHANGE UP
SARS-COV-2 IGG+IGM SERPL QL IA: NEGATIVE — SIGNIFICANT CHANGE UP
SODIUM SERPL-SCNC: 139 MMOL/L — SIGNIFICANT CHANGE UP (ref 135–145)
SPECIMEN SOURCE: SIGNIFICANT CHANGE UP
TSH SERPL-MCNC: 1.85 UIU/ML — SIGNIFICANT CHANGE UP (ref 0.27–4.2)
VIT B12 SERPL-MCNC: 392 PG/ML — SIGNIFICANT CHANGE UP (ref 232–1245)
WBC # BLD: 10.88 K/UL — HIGH (ref 3.8–10.5)
WBC # FLD AUTO: 10.88 K/UL — HIGH (ref 3.8–10.5)

## 2021-11-19 PROCEDURE — 99223 1ST HOSP IP/OBS HIGH 75: CPT

## 2021-11-19 PROCEDURE — 93306 TTE W/DOPPLER COMPLETE: CPT | Mod: 26

## 2021-11-19 PROCEDURE — 99221 1ST HOSP IP/OBS SF/LOW 40: CPT

## 2021-11-19 RX ORDER — LANOLIN ALCOHOL/MO/W.PET/CERES
3 CREAM (GRAM) TOPICAL AT BEDTIME
Refills: 0 | Status: DISCONTINUED | OUTPATIENT
Start: 2021-11-19 | End: 2021-11-29

## 2021-11-19 RX ORDER — ACETAMINOPHEN 500 MG
650 TABLET ORAL EVERY 6 HOURS
Refills: 0 | Status: DISCONTINUED | OUTPATIENT
Start: 2021-11-19 | End: 2021-11-29

## 2021-11-19 RX ADMIN — Medication 40 MILLIGRAM(S): at 05:31

## 2021-11-19 RX ADMIN — Medication 10 MILLIGRAM(S): at 17:20

## 2021-11-19 RX ADMIN — POLYETHYLENE GLYCOL 3350 17 GRAM(S): 17 POWDER, FOR SOLUTION ORAL at 22:18

## 2021-11-19 RX ADMIN — Medication 180 MILLIGRAM(S): at 05:31

## 2021-11-19 RX ADMIN — PANTOPRAZOLE SODIUM 40 MILLIGRAM(S): 20 TABLET, DELAYED RELEASE ORAL at 05:30

## 2021-11-19 RX ADMIN — Medication 81 MILLIGRAM(S): at 11:46

## 2021-11-19 RX ADMIN — DULOXETINE HYDROCHLORIDE 60 MILLIGRAM(S): 30 CAPSULE, DELAYED RELEASE ORAL at 11:45

## 2021-11-19 RX ADMIN — FENTANYL CITRATE 1 PATCH: 50 INJECTION INTRAVENOUS at 19:59

## 2021-11-19 RX ADMIN — RIVAROXABAN 10 MILLIGRAM(S): KIT at 11:46

## 2021-11-19 RX ADMIN — FENTANYL CITRATE 1 PATCH: 50 INJECTION INTRAVENOUS at 13:57

## 2021-11-19 RX ADMIN — MIRTAZAPINE 15 MILLIGRAM(S): 45 TABLET, ORALLY DISINTEGRATING ORAL at 22:18

## 2021-11-19 RX ADMIN — Medication 10 MILLIGRAM(S): at 05:31

## 2021-11-19 NOTE — BEHAVIORAL HEALTH ASSESSMENT NOTE - HPI (INCLUDE ILLNESS QUALITY, SEVERITY, DURATION, TIMING, CONTEXT, MODIFYING FACTORS, ASSOCIATED SIGNS AND SYMPTOMS)
81F , domiciled at home with  and daughter Chikis who is the primary caretaker, with no formal PPhx, no prior SA or psych admissions, no active substance abuse, PMH significant for bilateral knee arthritis,  HTN, remote breast ca s/p mastectomy and LN resection, provoked PE 1993, chronic pain, presents with inability to get out of bed and a stroke code was called on her, psychiatry consulted for AMS.    On interview, pt is pleasant and conversational, oriented to person, place, time, unable to perform serial 7s or spell a word backwards, has difficulty with basic calculations.  States she feels much better presently, does not remember incident of being confused PTA.  Denies anxiety, depression, SI/HI, AVH or paranoia.  Denies substance abuse.  Spends most of her day in bed, able to toilet herself, enjoys crossword puzzles and TV shows with her daughter Chikis.  The patient states that she is very happily  to her  and has 4 children. She states that she enjoys coloring and doing crossword puzzles at home.    Per collateral from daughter Chikis at bedside:  Pt was in her usual state of health until suddenly developing AMS late into the night; daughter states pt was unresponsive and incoherent for several hours, was difficult to arouse and was unable to lift her self from the bed without assistance. The daughter states that the patient gradually became more coherent within 5 hours of the initial episode.  States pt is now 95% back to baseline, although still not as sharp as usual per dtr.  Daughter states at baseline pt is AOx3, ambulatory with a walker for short distances to the bathroom and kitchen. The patient requires her daughter's assistance with bathing, cooking, cleaning, groceries, and medications due to physical disability.  States pt has been on the same 4 pain medications for years; was started on Lyrica about a month ago, no other recent changes.  Also states pt had an episode of shaking/jerking while in the hospital which she has described to staff.

## 2021-11-19 NOTE — BEHAVIORAL HEALTH ASSESSMENT NOTE - NSBHCHARTREVIEWINVESTIGATE_PSY_A_CORE FT
< from: 12 Lead ECG (12.21.18 @ 09:27) >      Ventricular Rate 86 BPM    Atrial Rate 86 BPM    P-R Interval 146 ms    QRS Duration 88 ms    Q-T Interval 372 ms    QTC Calculation(Bezet) 445 ms    P Axis -21 degrees    R Axis 32 degrees    T Axis 27 degrees    Diagnosis Line NORMAL SINUS RHYTHM  NORMAL ECG    Confirmed by ATTENDING, ED ([id number]),  DENISE FARNSWORTH ([id number]) on 12/21/2018 10:50:39 AM    < end of copied text >

## 2021-11-19 NOTE — BEHAVIORAL HEALTH ASSESSMENT NOTE - SUMMARY
81F , domiciled at home with  and daughter Chikis who is the primary caretaker, with no formal PPhx, no prior SA or psych admissions, no active substance abuse, PMH significant for bilateral knee arthritis,  HTN, remote breast ca s/p mastectomy and LN resection, provoked PE 1993, chronic pain, presents with inability to get out of bed and a stroke code was called on her, psychiatry consulted for AMS.  Pt presently AOx3 but inattentive, still slightly off baseline per daughter but significantly improved.  Denies SI/HI, denies current or past psychiatric issues.  Followed by pain management as outpatient. 81F , domiciled at home with  and daughter Chikis who is the primary caretaker, with no formal PPhx, no prior SA or psych admissions, no active substance abuse, PMH significant for bilateral knee arthritis,  HTN, remote breast ca s/p mastectomy and LN resection, provoked PE 1993, chronic pain, presents with inability to get out of bed and a stroke code was called on her, psychiatry consulted for AMS.  Pt presently AOx3 but inattentive, still slightly off baseline per daughter but significantly improved.  Denies SI/HI, denies current or past psychiatric issues.  Followed by pain management as outpatient.  Presentation c/w delirium 2/2 [facility].

## 2021-11-19 NOTE — BEHAVIORAL HEALTH ASSESSMENT NOTE - NSBHCONSULTMEDS_PSY_A_CORE FT
1. Does not require additional psych meds at present time.  Consider pain management consult for ongoing management of pt's Cymbalta being given for pain and other pain meds.    2. PRN: Melatonin 3mg PO qHS PRN insomnia.    3. Can consider EEG, f/u neuro recs.      4. Minimize use of benzos, opioids, anticholinergics, or other deliriogenic agents when possible.  Maintain sleep wake cycle.  Provide frequent reorientation and redirection.  Family member at bedside if possible. Assess for need for glasses and hearing aid (if applicable).    5. Pt does not meet criteria for psychiatric hospitalization.  Recommend outpt psych f/u at [facility] after d/c- [phone]

## 2021-11-19 NOTE — BEHAVIORAL HEALTH ASSESSMENT NOTE - NSBHCHARTREVIEWLAB_PSY_A_CORE FT
13.0   10.88 )-----------( 249      ( 2021 06:52 )             40.2         139  |  100  |  20  ----------------------------<  154<H>  3.6   |  24  |  1.14    Ca    9.2      2021 12:11    TPro  6.9  /  Alb  3.7  /  TBili  0.8  /  DBili  x   /  AST  25  /  ALT  9<L>  /  AlkPhos  71      Urinalysis Basic - ( 2021 16:26 )    Color: Light Yellow / Appearance: Clear / S.034 / pH: x  Gluc: x / Ketone: Negative  / Bili: Negative / Urobili: Negative   Blood: x / Protein: Negative / Nitrite: Negative   Leuk Esterase: Negative / RBC: 19 /hpf / WBC 1 /HPF   Sq Epi: x / Non Sq Epi: 1 /hpf / Bacteria: Negative

## 2021-11-19 NOTE — BEHAVIORAL HEALTH ASSESSMENT NOTE - RISK ASSESSMENT
Risk factors: acute/chronic medical issues, chronic pain, delirium    Protective factors: no current SIIP/HIIP, no h/o SA/SIB, no h/o psych admissions, no active substance abuse, domiciled, intact marriage, social supports, compliant with treatment, help-seeking behaviors Low Acute Suicide Risk

## 2021-11-19 NOTE — BEHAVIORAL HEALTH ASSESSMENT NOTE - DESCRIPTION
bilateral knee arthritis,  HTN, remote breast ca s/p mastectomy and LN resection, provoked PE 1993, chronic pain

## 2021-11-19 NOTE — BEHAVIORAL HEALTH ASSESSMENT NOTE - NSBHCONSULTFOLLOWAFTERCARE_PSY_A_CORE FT
OP psych f/u at [facility]- [phone]  [facility] clinic- [phone]

## 2021-11-19 NOTE — BEHAVIORAL HEALTH ASSESSMENT NOTE - NSBHCHARTREVIEWIMAGING_PSY_A_CORE FT
EXAM:  CT PERFUSION W MAPS IC                        EXAM:  CT ANGIO BRAIN (W)AW IC                        EXAM:  CT BRAIN STROKE PROTOCOL                        EXAM:  CT ANGIO NECK (W)AW IC                        PROCEDURE DATE:  11/18/2021      IMPRESSION: Age-appropriate involutional and ischemic gliotic changes progressed since 7/12/2010. No hemorrhage. Normal CTA of the head and neck. Normal CT perfusion.

## 2021-11-19 NOTE — BEHAVIORAL HEALTH ASSESSMENT NOTE - NSBHCHARTREVIEWVS_PSY_A_CORE FT
Vital Signs Last 24 Hrs  T(C): 36.7 (19 Nov 2021 08:17), Max: 37.1 (19 Nov 2021 01:20)  T(F): 98 (19 Nov 2021 08:17), Max: 98.7 (19 Nov 2021 01:20)  HR: 94 (19 Nov 2021 08:17) (70 - 94)  BP: 121/89 (19 Nov 2021 08:17) (114/70 - 169/97)  BP(mean): --  RR: 18 (19 Nov 2021 08:17) (16 - 18)  SpO2: 96% (19 Nov 2021 08:17) (93% - 100%)

## 2021-11-20 LAB
ANION GAP SERPL CALC-SCNC: 10 MMOL/L — SIGNIFICANT CHANGE UP (ref 5–17)
BUN SERPL-MCNC: 19 MG/DL — SIGNIFICANT CHANGE UP (ref 7–23)
CALCIUM SERPL-MCNC: 8.6 MG/DL — SIGNIFICANT CHANGE UP (ref 8.4–10.5)
CHLORIDE SERPL-SCNC: 104 MMOL/L — SIGNIFICANT CHANGE UP (ref 96–108)
CO2 SERPL-SCNC: 26 MMOL/L — SIGNIFICANT CHANGE UP (ref 22–31)
CREAT SERPL-MCNC: 0.76 MG/DL — SIGNIFICANT CHANGE UP (ref 0.5–1.3)
GLUCOSE SERPL-MCNC: 95 MG/DL — SIGNIFICANT CHANGE UP (ref 70–99)
HCT VFR BLD CALC: 36.9 % — SIGNIFICANT CHANGE UP (ref 34.5–45)
HGB BLD-MCNC: 11.9 G/DL — SIGNIFICANT CHANGE UP (ref 11.5–15.5)
MCHC RBC-ENTMCNC: 27.8 PG — SIGNIFICANT CHANGE UP (ref 27–34)
MCHC RBC-ENTMCNC: 32.2 GM/DL — SIGNIFICANT CHANGE UP (ref 32–36)
MCV RBC AUTO: 86.2 FL — SIGNIFICANT CHANGE UP (ref 80–100)
NRBC # BLD: 0 /100 WBCS — SIGNIFICANT CHANGE UP (ref 0–0)
PLATELET # BLD AUTO: 227 K/UL — SIGNIFICANT CHANGE UP (ref 150–400)
POTASSIUM SERPL-MCNC: 3.6 MMOL/L — SIGNIFICANT CHANGE UP (ref 3.5–5.3)
POTASSIUM SERPL-SCNC: 3.6 MMOL/L — SIGNIFICANT CHANGE UP (ref 3.5–5.3)
RBC # BLD: 4.28 M/UL — SIGNIFICANT CHANGE UP (ref 3.8–5.2)
RBC # FLD: 14.3 % — SIGNIFICANT CHANGE UP (ref 10.3–14.5)
SODIUM SERPL-SCNC: 140 MMOL/L — SIGNIFICANT CHANGE UP (ref 135–145)
WBC # BLD: 8.13 K/UL — SIGNIFICANT CHANGE UP (ref 3.8–10.5)
WBC # FLD AUTO: 8.13 K/UL — SIGNIFICANT CHANGE UP (ref 3.8–10.5)

## 2021-11-20 RX ORDER — METOPROLOL TARTRATE 50 MG
25 TABLET ORAL DAILY
Refills: 0 | Status: DISCONTINUED | OUTPATIENT
Start: 2021-11-20 | End: 2021-11-29

## 2021-11-20 RX ORDER — LOSARTAN POTASSIUM 100 MG/1
25 TABLET, FILM COATED ORAL DAILY
Refills: 0 | Status: DISCONTINUED | OUTPATIENT
Start: 2021-11-20 | End: 2021-11-29

## 2021-11-20 RX ADMIN — RIVAROXABAN 10 MILLIGRAM(S): KIT at 12:25

## 2021-11-20 RX ADMIN — FENTANYL CITRATE 1 PATCH: 50 INJECTION INTRAVENOUS at 19:00

## 2021-11-20 RX ADMIN — Medication 25 MILLIGRAM(S): at 17:33

## 2021-11-20 RX ADMIN — POLYETHYLENE GLYCOL 3350 17 GRAM(S): 17 POWDER, FOR SOLUTION ORAL at 21:29

## 2021-11-20 RX ADMIN — LOSARTAN POTASSIUM 25 MILLIGRAM(S): 100 TABLET, FILM COATED ORAL at 17:33

## 2021-11-20 RX ADMIN — Medication 81 MILLIGRAM(S): at 12:25

## 2021-11-20 RX ADMIN — Medication 10 MILLIGRAM(S): at 17:17

## 2021-11-20 RX ADMIN — Medication 10 MILLIGRAM(S): at 06:08

## 2021-11-20 RX ADMIN — PANTOPRAZOLE SODIUM 40 MILLIGRAM(S): 20 TABLET, DELAYED RELEASE ORAL at 06:08

## 2021-11-20 RX ADMIN — Medication 180 MILLIGRAM(S): at 06:08

## 2021-11-20 RX ADMIN — Medication 40 MILLIGRAM(S): at 06:08

## 2021-11-20 RX ADMIN — MIRTAZAPINE 15 MILLIGRAM(S): 45 TABLET, ORALLY DISINTEGRATING ORAL at 21:29

## 2021-11-20 RX ADMIN — Medication 650 MILLIGRAM(S): at 11:02

## 2021-11-20 RX ADMIN — DULOXETINE HYDROCHLORIDE 60 MILLIGRAM(S): 30 CAPSULE, DELAYED RELEASE ORAL at 15:05

## 2021-11-20 NOTE — PROGRESS NOTE ADULT - SUBJECTIVE AND OBJECTIVE BOX
CARDIOLOGY     PROGRESS  NOTE   ________________________________________________    CHIEF COMPLAINT:Patient is a 81y old  Female who presents with a chief complaint of change of mental status (19 Nov 2021 09:34)  doing better, more awake.  	  REVIEW OF SYSTEMS:  CONSTITUTIONAL: No fever, weight loss, or fatigue  EYES: No eye pain, visual disturbances, or discharge  ENT:  No difficulty hearing, tinnitus, vertigo; No sinus or throat pain  NECK: No pain or stiffness  RESPIRATORY: No cough, wheezing, chills or hemoptysis; No Shortness of Breath  CARDIOVASCULAR: No chest pain, palpitations, passing out, dizziness, or leg swelling  GASTROINTESTINAL: No abdominal or epigastric pain. No nausea, vomiting, or hematemesis; No diarrhea or constipation. No melena or hematochezia.  GENITOURINARY: No dysuria, frequency, hematuria, or incontinence  NEUROLOGICAL: No headaches, memory loss, loss of strength, numbness, or tremors  SKIN: No itching, burning, rashes, or lesions   LYMPH Nodes: No enlarged glands  ENDOCRINE: No heat or cold intolerance; No hair loss  MUSCULOSKELETAL: No joint pain or swelling; No muscle, back, or extremity pain  PSYCHIATRIC: No depression, anxiety, mood swings, or difficulty sleeping  HEME/LYMPH: No easy bruising, or bleeding gums  ALLERGY AND IMMUNOLOGIC: No hives or eczema	    [ ] All others negative	  [ ] Unable to obtain    PHYSICAL EXAM:  T(C): 36.6 (11-20-21 @ 04:56), Max: 36.9 (11-19-21 @ 16:43)  HR: 80 (11-20-21 @ 04:56) (79 - 82)  BP: 102/62 (11-20-21 @ 04:56) (97/64 - 122/79)  RR: 18 (11-20-21 @ 04:56) (18 - 18)  SpO2: 95% (11-20-21 @ 04:56) (93% - 95%)  Wt(kg): --  I&O's Summary      Appearance: Normal	  HEENT:   Normal oral mucosa, PERRL, EOMI	  Lymphatic: No lymphadenopathy  Cardiovascular: Normal S1 S2, No JVD, + murmurs, No edema  Respiratory: Lungs clear to auscultation	  Psychiatry: A & O x 3, Mood & affect appropriate  Gastrointestinal:  Soft, Non-tender, + BS	  Skin: No rashes, No ecchymoses, No cyanosis	  Neurologic: Non-focal  Extremities: Normal range of motion, No clubbing, cyanosis or edema  Vascular: Peripheral pulses palpable 2+ bilaterally    MEDICATIONS  (STANDING):  aspirin enteric coated 81 milliGRAM(s) Oral daily  baclofen 10 milliGRAM(s) Oral two times a day  diltiazem    milliGRAM(s) Oral daily  DULoxetine 60 milliGRAM(s) Oral daily  fentaNYL   Patch  50 MICROgram(s)/Hr 1 Patch Transdermal every 72 hours  furosemide    Tablet 40 milliGRAM(s) Oral daily  mirtazapine 15 milliGRAM(s) Oral at bedtime  pantoprazole    Tablet 40 milliGRAM(s) Oral before breakfast  polyethylene glycol 3350 17 Gram(s) Oral at bedtime  rivaroxaban 10 milliGRAM(s) Oral daily      TELEMETRY: 	    ECG:  	  RADIOLOGY:  OTHER: 	  	  LABS:	 	    CARDIAC MARKERS:  CARDIAC MARKERS ( 18 Nov 2021 14:58 )  x     / x     / 62 U/L / x     / 3.7 ng/mL  CARDIAC MARKERS ( 18 Nov 2021 11:38 )  x     / x     / 90 U/L / x     / 6.8 ng/mL                                11.9   8.13  )-----------( 227      ( 20 Nov 2021 06:43 )             36.9     11-20    140  |  104  |  19  ----------------------------<  95  3.6   |  26  |  0.76    Ca    8.6      20 Nov 2021 06:43    TPro  6.9  /  Alb  3.7  /  TBili  0.8  /  DBili  x   /  AST  25  /  ALT  9<L>  /  AlkPhos  71  11-19    proBNP: Serum Pro-Brain Natriuretic Peptide: 2012 pg/mL (11-18 @ 14:58)  Serum Pro-Brain Natriuretic Peptide: 4144 pg/mL (11-18 @ 11:38)    Lipid Profile:   HgA1c:   TSH: Thyroid Stimulating Hormone, Serum: 1.85 uIU/mL (11-19 @ 08:53)    PT/INR - ( 18 Nov 2021 11:38 )   PT: 15.9 sec;   INR: 1.34 ratio         PTT - ( 18 Nov 2021 11:38 )  PTT:28.0 sec  . Does not require additional psych meds at present time.  Consider pain management consult for ongoing management of pt's Cymbalta being given for pain and other pain meds.    2. PRN: Melatonin 3mg PO qHS PRN insomnia.    3. Can consider EEG, f/u neuro recs.      4. Minimize use of benzos, opioids, anticholinergics, or other deliriogenic agents when possible.  Maintain sleep wake cycle.  Provide frequent reorientation and redirection.  Family member at bedside if possible. Assess for need for glasses and hearing aid (if applicable)    < from: TTE with Doppler (w/Cont) (11.19.21 @ 07:59) >  1. Mitral annular calcification, otherwise normal mitral  valve. Minimal mitral regurgitation.  2. Calcified trileaflet aortic valve with normal opening.  Minimal aortic regurgitation.  3. Endocardial visualization enhanced with intravenous  injection of Ultrasonic Enhancing Agent (Definity).  Severe  segmental left ventricular systolic dysfunction. No left  ventricular thrombus. The mid anterior septum, the mid  inferolateral wall, the mid anterior wall, the mid inferior  wall, the mid inferoseptum, and the apical cap are  hypokinetic. The apical inferior wall, the apical anterior  wall, the apical septum, and the apical lateral wall are  akinetic.  4. Severe reversible diastolic dysfunction (Stage III).    < end of copied text >        Assessment and plan  ---------------------------  82 yo f bilateral knee arthritis,  HTN, remote breast ca s/p mastectomy and LN resection, provoked PE 1993, chronic pain, presents with inability to get out of bed and a stroke code was called on her. Pt with daughter states that last known well was 1 am, and states at 3 am, pt woke up and was unable to get out of bed with her walker as she normally does. She did not respond to her family members, was able to follow commands, but denies any slurring speech, or one-sided weakness. Daughter denies any n/v/f/c, cp. palpitations. Stroke code showed no acute findings.  pt apparently had disorientation and lethargy since early thi am , and no responding  to verbal stimuli and being disoriented.  pt daughter claims as she was given Lyrica she has had moments of lethargy.  pt awake and alert now in ER.  change of mental status seen by neuro  r/o seizure disorder check EEG  continue all pain meds, hold Lyrica  continue ac  check labs  urology  eval   pain management, awaiting consult called yesterday  echo results noted, will start coreg/ losartan  ?cath will discuss with pt and family  awaiting EEG/ MRI

## 2021-11-20 NOTE — CONSULT NOTE ADULT - SUBJECTIVE AND OBJECTIVE BOX
UROLOGY CONSULT NOTE    HPI:  This is a 81y old Female with PMHx of right breast ca s/p mastectomy, PE, HTN, scoliosis, who was admitted for altered mental status and was found to have a left ureteral calculus on CT scan.  The patient cannot recall what events occurred to put her in the hospital.  A code stroke was called in the ER with CT head negative.  Patient denies having any recent flank pain, fevers, chills, N/V, changes in urinary habits, dysuria, hematuria.  Denies history of  disease, nephrolithiasis.  She normally has back pain from scoliosis and is taking meds for it.      PAST MEDICAL HISTORY    HTN (hypertension)    Scoliosis    Breast CA        PAST SURGICAL HISTORY    H/O  section    History of hip replacement    H/O mastectomy, right    H/O: hysterectomy    Cataract    H/O shoulder surgery        FAMILY HISTORY    Family history of breast cancer (Sibling)    Family history of Hodgkin&#x27;s disease (Sibling)    Family history of emphysema    Family history of lung cancer        HOME MEDICATIONS    acetaminophen 325 mg oral tablet: 2 tab(s) orally every 6 hours, As needed, Temp greater or equal to 38C (100.4F), Mild Pain (1 - 3) (26 Dec 2018 07:37)  Amitiza: 24 microgram(s) orally 2 times a day (09 Aug 2017 01:35)  aspirin 81 mg oral delayed release capsule: 1 tab(s) orally once a day (09 Aug 2017 01:36)  baclofen: 10  orally once a day (09 Aug 2017 01:35)  Cartia XT: 180 milligram(s) orally once a day (09 Aug 2017 01:26)  cevimeline: 30 milligram(s) orally 3 times a day (09 Aug 2017 01:34)  docusate sodium 100 mg oral capsule: 1 cap(s) orally 3 times a day (26 Dec 2018 07:37)  DULoxetine: 60 milligram(s) orally once a day (09 Aug 2017 01:31)  fentaNYL 50 mcg/hr transdermal film, extended release: 1 patch transdermal every 72 hours (26 Dec 2018 07:37)  furosemide: 40 milligram(s) orally once a day (09 Aug 2017 01:34)  HYDROmorphone 2 mg oral tablet: 1 tab(s) orally every 4 hours, As needed, Moderate Pain (4 - 6) (26 Dec 2018 07:37)  omeprazole: 20 milligram(s) orally once a day (09 Aug 2017 01:35)  oxyCODONE 10 mg oral tablet: 1 tab(s) orally 5 times a day, As Needed (09 Aug 2017 01:26)  polyethylene glycol 3350 oral powder for reconstitution: 17 gram(s) orally once a day (at bedtime) (26 Dec 2018 07:37)  rivaroxaban 10 mg oral tablet: 1 tab(s) orally once a day (26 Dec 2018 07:37)  traMADol 50 mg oral tablet: 1 tab(s) orally every 6 hours, As needed, Mild Pain (1 - 3) (26 Dec 2018 07:37)  traZODone: 50 milligram(s) orally once a day (at bedtime) (09 Aug 2017 01:34)      DRUG ALLERGIES        REVIEW OF SYSTEMS: Pertinent positives and negatives as stated in HPI, otherwise negative      VITAL SIGNS    T(F): 97.9, Max: 97.9 (21 @ 04:56)  HR: 80  BP: 102/62  RR: 18  SpO2: 95%    I's & O's        PHYSICAL EXAM    Gen: appears disheveled, NAD, A+Ox3  Abd: Soft, NT/ND  Back: no CVAT bilaterally  Ext: No edema present b/l      LABS:                        11.9   8.13  )-----------( 227               36.9     140  |  104  |  19  ----------------------------<  95  3.6   |  26  |  0.76    Ca    8.6    TPro  6.9  /  Alb  3.7  /  TBili  0.8  /  DBili  x   /  AST  25  /  ALT  9   /  AlkPhos  71      CARDIAC MARKERS ( 2021 14:58 )  x     / x     / 62 U/L / x     / 3.7 ng/mL      Urinalysis Basic:    Color: Light Yellow / Appearance: Clear / S.034 / pH: x  Gluc: x / Ketone: Negative  / Bili: Negative / Urobili: Negative   Blood: x / Protein: Negative / Nitrite: Negative   Leuk Esterase: Negative / RBC: 19 /hpf / WBC 1 /HPF   Sq Epi: x / Non Sq Epi: 1 /hpf / Bacteria: Negative      Urine culture: normal aminta      IMAGING:      CT: Mild to moderate left hydronephrosis with extrarenal pelvis similar to 2012 left hydroureter traced to upper pelvic ureteral 4 mm. calculus   UROLOGY CONSULT NOTE    HPI:  This is a 81y old Female with PMHx of right breast ca s/p mastectomy, PE, HTN, scoliosis, who was admitted for altered mental status and was found to have a 4mm left proximal ureteral calculus on CT scan.  The patient cannot recall what events occurred to put her in the hospital.  A code stroke was called in the ER with CT head negative.  Patient denies having any recent flank pain, fevers, chills, N/V, changes in urinary habits, dysuria, hematuria. States she had microscopic blood in urine in past and had seen urologist many years ago, but denies history of renal stones. She normally has back pain from scoliosis and is taking meds for it, denies changes in chronic pain.       PAST MEDICAL HISTORY    HTN (hypertension)    Scoliosis    Breast CA        PAST SURGICAL HISTORY    H/O  section    History of hip replacement    H/O mastectomy, right    H/O: hysterectomy    Cataract    H/O shoulder surgery        FAMILY HISTORY    Family history of breast cancer (Sibling)    Family history of Hodgkin&#x27;s disease (Sibling)    Family history of emphysema    Family history of lung cancer        HOME MEDICATIONS    acetaminophen 325 mg oral tablet: 2 tab(s) orally every 6 hours, As needed, Temp greater or equal to 38C (100.4F), Mild Pain (1 - 3) (26 Dec 2018 07:37)  Amitiza: 24 microgram(s) orally 2 times a day (09 Aug 2017 01:35)  aspirin 81 mg oral delayed release capsule: 1 tab(s) orally once a day (09 Aug 2017 01:36)  baclofen: 10  orally once a day (09 Aug 2017 01:35)  Cartia XT: 180 milligram(s) orally once a day (09 Aug 2017 01:26)  cevimeline: 30 milligram(s) orally 3 times a day (09 Aug 2017 01:34)  docusate sodium 100 mg oral capsule: 1 cap(s) orally 3 times a day (26 Dec 2018 07:37)  DULoxetine: 60 milligram(s) orally once a day (09 Aug 2017 01:31)  fentaNYL 50 mcg/hr transdermal film, extended release: 1 patch transdermal every 72 hours (26 Dec 2018 07:37)  furosemide: 40 milligram(s) orally once a day (09 Aug 2017 01:34)  HYDROmorphone 2 mg oral tablet: 1 tab(s) orally every 4 hours, As needed, Moderate Pain (4 - 6) (26 Dec 2018 07:37)  omeprazole: 20 milligram(s) orally once a day (09 Aug 2017 01:35)  oxyCODONE 10 mg oral tablet: 1 tab(s) orally 5 times a day, As Needed (09 Aug 2017 01:26)  polyethylene glycol 3350 oral powder for reconstitution: 17 gram(s) orally once a day (at bedtime) (26 Dec 2018 07:37)  rivaroxaban 10 mg oral tablet: 1 tab(s) orally once a day (26 Dec 2018 07:37)  traMADol 50 mg oral tablet: 1 tab(s) orally every 6 hours, As needed, Mild Pain (1 - 3) (26 Dec 2018 07:37)  traZODone: 50 milligram(s) orally once a day (at bedtime) (09 Aug 2017 01:34)      DRUG ALLERGIES        REVIEW OF SYSTEMS: Pertinent positives and negatives as stated in HPI, otherwise negative      VITAL SIGNS    T(F): 97.9, Max: 97.9 (21 @ 04:56)  HR: 80  BP: 102/62  RR: 18  SpO2: 95%    I's & O's        PHYSICAL EXAM    Gen: appears disheveled, NAD, A+Ox3  Abd: Soft, NT/ND  Back: no CVAT bilaterally  Ext: No edema present b/l      LABS:                        11.9   8.13  )-----------( 227               36.9     140  |  104  |  19  ----------------------------<  95  3.6   |  26  |  0.76    Ca    8.6    TPro  6.9  /  Alb  3.7  /  TBili  0.8  /  DBili  x   /  AST  25  /  ALT  9   /  AlkPhos  71      CARDIAC MARKERS ( 2021 14:58 )  x     / x     / 62 U/L / x     / 3.7 ng/mL      Urinalysis Basic:    Color: Light Yellow / Appearance: Clear / S.034 / pH: x  Gluc: x / Ketone: Negative  / Bili: Negative / Urobili: Negative   Blood: x / Protein: Negative / Nitrite: Negative   Leuk Esterase: Negative / RBC: 19 /hpf / WBC 1 /HPF   Sq Epi: x / Non Sq Epi: 1 /hpf / Bacteria: Negative      Urine culture: normal aminta      IMAGING:      CT: Mild to moderate left hydronephrosis with extrarenal pelvis similar to 2012 left hydroureter traced to upper pelvic ureteral 4 mm. calculus

## 2021-11-20 NOTE — CONSULT NOTE ADULT - ASSESSMENT
81 year old female admitted for AMS, found to have a left 4mm proximal ureteral calculus with mild-moderate hydronephrosis; urine culture negative    -IV/PO fluid hydration  -strain all urine  -analgesia as needed   81 year old female admitted for AMS, found to have a left 4mm proximal ureteral calculus with mild-moderate hydronephrosis; urine culture negative, Cr 0.76, without acute pain.     -No acute urologic intervention indicated   -IV/PO fluid hydration  -strain all urine  -analgesia as needed  -discussed with patient and daughter at bedside plan for outpatient follow up to discuss definitive stone management    [facility]  [address] [place] [address]  [phone]    Discussed with Dr. Liang   81 year old female admitted for AMS, found to have a left 4mm proximal ureteral calculus with mild-moderate hydronephrosis; urine culture negative, WBC 8, Cr 0.76, without fevers or acute pain.     -No acute urologic intervention indicated   -IV/PO fluid hydration  -strain all urine  -analgesia as needed  -discussed with patient and daughter at bedside plan for outpatient follow up to discuss definitive stone management    [facility]  [address] [place] [address], [place] [id number]  [place] [id number] [phone]    Discussed with Dr. Liang   81 year old female admitted for AMS, found to have a left 4mm proximal ureteral calculus with mild-moderate hydronephrosis; urine culture negative, WBC 8, Cr 0.76, without fevers or acute pain.     -No acute urologic intervention indicated   -IV/PO fluid hydration  -strain all urine  -analgesia as needed  -discussed with patient and daughter at bedside plan for outpatient follow up to discuss definitive stone management    Pleas follow up with Dr. Steve Rangel or Dr. David Hoenig  [facility]  [address] [place] [address], [place] [id number] [phone]    Discussed with Dr. Liang

## 2021-11-21 LAB
CK MB CFR SERPL CALC: 5.1 NG/ML — HIGH (ref 0–3.8)
CK SERPL-CCNC: 69 U/L — SIGNIFICANT CHANGE UP (ref 25–170)
GLUCOSE BLDC GLUCOMTR-MCNC: 107 MG/DL — HIGH (ref 70–99)
NT-PROBNP SERPL-SCNC: 8165 PG/ML — HIGH (ref 0–300)
PROT ?TM UR-MCNC: 6 MG/DL — SIGNIFICANT CHANGE UP (ref 0–12)
TROPONIN T, HIGH SENSITIVITY RESULT: 140 NG/L — HIGH (ref 0–51)

## 2021-11-21 PROCEDURE — 93010 ELECTROCARDIOGRAM REPORT: CPT

## 2021-11-21 RX ORDER — POLYETHYLENE GLYCOL 3350 17 G/17G
17 POWDER, FOR SOLUTION ORAL
Refills: 0 | Status: DISCONTINUED | OUTPATIENT
Start: 2021-11-21 | End: 2021-11-29

## 2021-11-21 RX ADMIN — Medication 25 MILLIGRAM(S): at 06:55

## 2021-11-21 RX ADMIN — Medication 10 MILLIGRAM(S): at 06:55

## 2021-11-21 RX ADMIN — MIRTAZAPINE 15 MILLIGRAM(S): 45 TABLET, ORALLY DISINTEGRATING ORAL at 21:35

## 2021-11-21 RX ADMIN — Medication 40 MILLIGRAM(S): at 06:55

## 2021-11-21 RX ADMIN — LOSARTAN POTASSIUM 25 MILLIGRAM(S): 100 TABLET, FILM COATED ORAL at 06:58

## 2021-11-21 RX ADMIN — PANTOPRAZOLE SODIUM 40 MILLIGRAM(S): 20 TABLET, DELAYED RELEASE ORAL at 06:55

## 2021-11-21 RX ADMIN — FENTANYL CITRATE 1 PATCH: 50 INJECTION INTRAVENOUS at 09:13

## 2021-11-21 RX ADMIN — Medication 650 MILLIGRAM(S): at 22:00

## 2021-11-21 RX ADMIN — DULOXETINE HYDROCHLORIDE 60 MILLIGRAM(S): 30 CAPSULE, DELAYED RELEASE ORAL at 13:16

## 2021-11-21 RX ADMIN — Medication 10 MILLIGRAM(S): at 17:20

## 2021-11-21 RX ADMIN — Medication 81 MILLIGRAM(S): at 13:15

## 2021-11-21 RX ADMIN — FENTANYL CITRATE 1 PATCH: 50 INJECTION INTRAVENOUS at 19:20

## 2021-11-21 RX ADMIN — Medication 5 MILLIGRAM(S): at 13:16

## 2021-11-21 RX ADMIN — Medication 650 MILLIGRAM(S): at 21:35

## 2021-11-21 RX ADMIN — RIVAROXABAN 10 MILLIGRAM(S): KIT at 13:16

## 2021-11-21 NOTE — PROGRESS NOTE ADULT - SUBJECTIVE AND OBJECTIVE BOX
CARDIOLOGY     PROGRESS  NOTE   ________________________________________________    CHIEF COMPLAINT:Patient is a 81y old  Female who presents with a chief complaint of change of mental status (20 Nov 2021 13:42)  doing better.  	  REVIEW OF SYSTEMS:  CONSTITUTIONAL: No fever, weight loss, or fatigue  EYES: No eye pain, visual disturbances, or discharge  ENT:  No difficulty hearing, tinnitus, vertigo; No sinus or throat pain  NECK: No pain or stiffness  RESPIRATORY: No cough, wheezing, chills or hemoptysis; No Shortness of Breath  CARDIOVASCULAR: No chest pain, palpitations, passing out, dizziness, or leg swelling  GASTROINTESTINAL: No abdominal or epigastric pain. No nausea, vomiting, or hematemesis; No diarrhea or constipation. No melena or hematochezia.  GENITOURINARY: No dysuria, frequency, hematuria, or incontinence  NEUROLOGICAL: No headaches, memory loss, loss of strength, numbness, or tremors  SKIN: No itching, burning, rashes, or lesions   LYMPH Nodes: No enlarged glands  ENDOCRINE: No heat or cold intolerance; No hair loss  MUSCULOSKELETAL: No joint pain or swelling; No muscle, back, or extremity pain  PSYCHIATRIC: No depression, anxiety, mood swings, or difficulty sleeping  HEME/LYMPH: No easy bruising, or bleeding gums  ALLERGY AND IMMUNOLOGIC: No hives or eczema	    [ ] All others negative	  [ ] Unable to obtain    PHYSICAL EXAM:  T(C): 36.9 (11-21-21 @ 06:40), Max: 36.9 (11-21-21 @ 06:40)  HR: 76 (11-21-21 @ 06:40) (76 - 91)  BP: 117/79 (11-21-21 @ 06:40) (110/64 - 131/84)  RR: 18 (11-21-21 @ 06:40) (18 - 18)  SpO2: 96% (11-21-21 @ 06:40) (95% - 96%)  Wt(kg): --  I&O's Summary    20 Nov 2021 07:01  -  21 Nov 2021 07:00  --------------------------------------------------------  IN: 474 mL / OUT: 800 mL / NET: -326 mL        Appearance: Normal	  HEENT:   Normal oral mucosa, PERRL, EOMI	  Lymphatic: No lymphadenopathy  Cardiovascular: Normal S1 S2, No JVD, + murmurs, No edema  Respiratory: Lungs clear to auscultation	  Psychiatry: A & O x 3, Mood & affect appropriate  Gastrointestinal:  Soft, Non-tender, + BS	  Skin: No rashes, No ecchymoses, No cyanosis	  Neurologic: Non-focal  Extremities: Normal range of motion, No clubbing, cyanosis or edema  Vascular: Peripheral pulses palpable 2+ bilaterally    MEDICATIONS  (STANDING):  aspirin enteric coated 81 milliGRAM(s) Oral daily  baclofen 10 milliGRAM(s) Oral two times a day  DULoxetine 60 milliGRAM(s) Oral daily  fentaNYL   Patch  50 MICROgram(s)/Hr 1 Patch Transdermal every 72 hours  furosemide    Tablet 40 milliGRAM(s) Oral daily  losartan 25 milliGRAM(s) Oral daily  metoprolol succinate ER 25 milliGRAM(s) Oral daily  mirtazapine 15 milliGRAM(s) Oral at bedtime  pantoprazole    Tablet 40 milliGRAM(s) Oral before breakfast  polyethylene glycol 3350 17 Gram(s) Oral at bedtime  rivaroxaban 10 milliGRAM(s) Oral daily      TELEMETRY: 	    ECG:  	  RADIOLOGY:  OTHER: 	  	  LABS:	 	    CARDIAC MARKERS:                                11.9   8.13  )-----------( 227      ( 20 Nov 2021 06:43 )             36.9     11-20    140  |  104  |  19  ----------------------------<  95  3.6   |  26  |  0.76    Ca    8.6      20 Nov 2021 06:43    TPro  6.9  /  Alb  3.7  /  TBili  0.8  /  DBili  x   /  AST  25  /  ALT  9<L>  /  AlkPhos  71  11-19    proBNP: Serum Pro-Brain Natriuretic Peptide: 2012 pg/mL (11-18 @ 14:58)  Serum Pro-Brain Natriuretic Peptide: 4144 pg/mL (11-18 @ 11:38)    Lipid Profile:   HgA1c:   TSH: Thyroid Stimulating Hormone, Serum: 1.85 uIU/mL (11-19 @ 08:53)    -No acute urologic intervention indicated   -IV/PO fluid hydration  -strain all urine  -analgesia as needed  -discussed with patient and daughter at bedside plan for outpatient follow up to discuss definitive stone management      Assessment and plan  ---------------------------  80 yo f bilateral knee arthritis,  HTN, remote breast ca s/p mastectomy and LN resection, provoked PE 1993, chronic pain, presents with inability to get out of bed and a stroke code was called on her. Pt with daughter states that last known well was 1 am, and states at 3 am, pt woke up and was unable to get out of bed with her walker as she normally does. She did not respond to her family members, was able to follow commands, but denies any slurring speech, or one-sided weakness. Daughter denies any n/v/f/c, cp. palpitations. Stroke code showed no acute findings.  pt apparently had disorientation and lethargy since early thi am , and no responding  to verbal stimuli and being disoriented.  pt daughter claims as she was given Lyrica she has had moments of lethargy.  pt awake and alert now in ER.  change of mental status seen by neuro  r/o seizure disorder check EEG  continue all pain meds, hold Lyrica  continue ac  check labs  urology  appreciated  pain management, awaiting consult called   echo results noted, will start coreg/ losartan/ severe lv dysfunction  ?cath will discuss with pt and family  awaiting EEG/ MRI    	                    CARDIOLOGY     PROGRESS  NOTE   ________________________________________________    CHIEF COMPLAINT:Patient is a 81y old  Female who presents with a chief complaint of change of mental status (20 Nov 2021 13:42)  doing better.  	  REVIEW OF SYSTEMS:  CONSTITUTIONAL: No fever, weight loss, or fatigue  EYES: No eye pain, visual disturbances, or discharge  ENT:  No difficulty hearing, tinnitus, vertigo; No sinus or throat pain  NECK: No pain or stiffness  RESPIRATORY: No cough, wheezing, chills or hemoptysis; No Shortness of Breath  CARDIOVASCULAR: No chest pain, palpitations, passing out, dizziness, or leg swelling  GASTROINTESTINAL: No abdominal or epigastric pain. No nausea, vomiting, or hematemesis; No diarrhea or constipation. No melena or hematochezia.  GENITOURINARY: No dysuria, frequency, hematuria, or incontinence  NEUROLOGICAL: No headaches, memory loss, loss of strength, numbness, or tremors  SKIN: No itching, burning, rashes, or lesions   LYMPH Nodes: No enlarged glands  ENDOCRINE: No heat or cold intolerance; No hair loss  MUSCULOSKELETAL: No joint pain or swelling; No muscle, back, or extremity pain  PSYCHIATRIC: No depression, anxiety, mood swings, or difficulty sleeping  HEME/LYMPH: No easy bruising, or bleeding gums  ALLERGY AND IMMUNOLOGIC: No hives or eczema	    [ ] All others negative	  [ ] Unable to obtain    PHYSICAL EXAM:  T(C): 36.9 (11-21-21 @ 06:40), Max: 36.9 (11-21-21 @ 06:40)  HR: 76 (11-21-21 @ 06:40) (76 - 91)  BP: 117/79 (11-21-21 @ 06:40) (110/64 - 131/84)  RR: 18 (11-21-21 @ 06:40) (18 - 18)  SpO2: 96% (11-21-21 @ 06:40) (95% - 96%)  Wt(kg): --  I&O's Summary    20 Nov 2021 07:01  -  21 Nov 2021 07:00  --------------------------------------------------------  IN: 474 mL / OUT: 800 mL / NET: -326 mL        Appearance: Normal	  HEENT:   Normal oral mucosa, PERRL, EOMI	  Lymphatic: No lymphadenopathy  Cardiovascular: Normal S1 S2, No JVD, + murmurs, No edema  Respiratory: Lungs clear to auscultation	  Psychiatry: A & O x 3, Mood & affect appropriate  Gastrointestinal:  Soft, Non-tender, + BS	  Skin: No rashes, No ecchymoses, No cyanosis	  Neurologic: Non-focal  Extremities: Normal range of motion, No clubbing, cyanosis or edema  Vascular: Peripheral pulses palpable 2+ bilaterally    MEDICATIONS  (STANDING):  aspirin enteric coated 81 milliGRAM(s) Oral daily  baclofen 10 milliGRAM(s) Oral two times a day  DULoxetine 60 milliGRAM(s) Oral daily  fentaNYL   Patch  50 MICROgram(s)/Hr 1 Patch Transdermal every 72 hours  furosemide    Tablet 40 milliGRAM(s) Oral daily  losartan 25 milliGRAM(s) Oral daily  metoprolol succinate ER 25 milliGRAM(s) Oral daily  mirtazapine 15 milliGRAM(s) Oral at bedtime  pantoprazole    Tablet 40 milliGRAM(s) Oral before breakfast  polyethylene glycol 3350 17 Gram(s) Oral at bedtime  rivaroxaban 10 milliGRAM(s) Oral daily      TELEMETRY: 	    ECG:  	  RADIOLOGY:  OTHER: 	  	  LABS:	 	    CARDIAC MARKERS:                                11.9   8.13  )-----------( 227      ( 20 Nov 2021 06:43 )             36.9     11-20    140  |  104  |  19  ----------------------------<  95  3.6   |  26  |  0.76    Ca    8.6      20 Nov 2021 06:43    TPro  6.9  /  Alb  3.7  /  TBili  0.8  /  DBili  x   /  AST  25  /  ALT  9<L>  /  AlkPhos  71  11-19    proBNP: Serum Pro-Brain Natriuretic Peptide: 2012 pg/mL (11-18 @ 14:58)  Serum Pro-Brain Natriuretic Peptide: 4144 pg/mL (11-18 @ 11:38)    Lipid Profile:   HgA1c:   TSH: Thyroid Stimulating Hormone, Serum: 1.85 uIU/mL (11-19 @ 08:53)    -No acute urologic intervention indicated   -IV/PO fluid hydration  -strain all urine  -analgesia as needed  -discussed with patient and daughter at bedside plan for outpatient follow up to discuss definitive stone management      Assessment and plan  ---------------------------  80 yo f bilateral knee arthritis,  HTN, remote breast ca s/p mastectomy and LN resection, provoked PE 1993, chronic pain, presents with inability to get out of bed and a stroke code was called on her. Pt with daughter states that last known well was 1 am, and states at 3 am, pt woke up and was unable to get out of bed with her walker as she normally does. She did not respond to her family members, was able to follow commands, but denies any slurring speech, or one-sided weakness. Daughter denies any n/v/f/c, cp. palpitations. Stroke code showed no acute findings.  pt apparently had disorientation and lethargy since early thi am , and no responding  to verbal stimuli and being disoriented.  pt daughter claims as she was given Lyrica she has had moments of lethargy.  pt awake and alert now in ER.  change of mental status seen by neuro  r/o seizure disorder check EEG  continue all pain meds, hold Lyrica  continue ac  check labs  urology  appreciated  pain management, awaiting consult called   echo results noted, will start coreg/ losartan/ severe lv dysfunction  ?cath will discuss with pt and family  awaiting EEG/ MRI if no contraindication

## 2021-11-21 NOTE — DIETITIAN INITIAL EVALUATION ADULT. - PERTINENT MEDS FT
MEDICATIONS  (STANDING):  aspirin enteric coated 81 milliGRAM(s) Oral daily  baclofen 10 milliGRAM(s) Oral two times a day  DULoxetine 60 milliGRAM(s) Oral daily  fentaNYL   Patch  50 MICROgram(s)/Hr 1 Patch Transdermal every 72 hours  furosemide    Tablet 40 milliGRAM(s) Oral daily  losartan 25 milliGRAM(s) Oral daily  metoprolol succinate ER 25 milliGRAM(s) Oral daily  mirtazapine 15 milliGRAM(s) Oral at bedtime  pantoprazole    Tablet 40 milliGRAM(s) Oral before breakfast  polyethylene glycol 3350 17 Gram(s) Oral at bedtime  rivaroxaban 10 milliGRAM(s) Oral daily    MEDICATIONS  (PRN):  acetaminophen     Tablet .. 650 milliGRAM(s) Oral every 6 hours PRN Temp greater or equal to 38C (100.4F), Mild Pain (1 - 3)  HYDROmorphone   Tablet 2 milliGRAM(s) Oral two times a day PRN Moderate Pain (4 - 6)  melatonin 3 milliGRAM(s) Oral at bedtime PRN Insomnia

## 2021-11-21 NOTE — DIETITIAN INITIAL EVALUATION ADULT. - ORAL INTAKE PTA/DIET HISTORY
toast, eggs, coffee for breakfast, tomato, tuna, egg, kimble sandwich-she skips lunch at time. protein and spaghetti for dinner. she is missing teeth so she chews her food very well and slow. she refused need for Soft foods

## 2021-11-21 NOTE — DIETITIAN INITIAL EVALUATION ADULT. - OTHER INFO
Denies nausea/vomit :  Denies difficulty swallow : missing teeth -refused Soft foods  Denies diarrhea- experiencing constipation as per flow sheets  Last BM : 11/16  NKFA  Ht: 170.2cm-pt reports that she used to be 5'7", she does not believe that she is that any longer but could not report current height  Ht taken from dosing  Dosing wt: 72kg  Ht used for BMI 67"  Wt used for BMI 72kg  Education Provided : pt was educated on the importance of supplements to increase calorie and protein intake in light of RD's nutritional findings.   pressure injury: right buttock stage 1, left buttock stage 2

## 2021-11-21 NOTE — PROVIDER CONTACT NOTE (OTHER) - ASSESSMENT
Pt A&Ox4 states "not feeling well", c/o slight lightheadedness and cold sweat. Pt VSS, denies headache, denies SOB, CP, palpitation or N/V. Pt skin is moist no diaphoresis.

## 2021-11-21 NOTE — DIETITIAN NUTRITION RISK NOTIFICATION - TREATMENT: THE FOLLOWING DIET HAS BEEN RECOMMENDED
Diet, Regular:   Panchito(7 Gm Arginine/7 Gm Glut/1.2 Gm HMB     Qty per Day:  2 (11-21-21 @ 11:59) [Pending Verification By Attending]  Diet, Regular (11-18-21 @ 18:49) [Active]

## 2021-11-22 LAB
ANION GAP SERPL CALC-SCNC: 12 MMOL/L — SIGNIFICANT CHANGE UP (ref 5–17)
BUN SERPL-MCNC: 17 MG/DL — SIGNIFICANT CHANGE UP (ref 7–23)
CALCIUM SERPL-MCNC: 9.1 MG/DL — SIGNIFICANT CHANGE UP (ref 8.4–10.5)
CHLORIDE SERPL-SCNC: 104 MMOL/L — SIGNIFICANT CHANGE UP (ref 96–108)
CO2 SERPL-SCNC: 24 MMOL/L — SIGNIFICANT CHANGE UP (ref 22–31)
COVID-19 SPIKE DOMAIN AB INTERP: POSITIVE
COVID-19 SPIKE DOMAIN ANTIBODY RESULT: 73.8 U/ML — HIGH
CREAT SERPL-MCNC: 0.56 MG/DL — SIGNIFICANT CHANGE UP (ref 0.5–1.3)
FERRITIN SERPL-MCNC: 248 NG/ML — HIGH (ref 15–150)
GLUCOSE SERPL-MCNC: 117 MG/DL — HIGH (ref 70–99)
HIV 1+2 AB+HIV1 P24 AG SERPL QL IA: SIGNIFICANT CHANGE UP
POTASSIUM SERPL-MCNC: 3.5 MMOL/L — SIGNIFICANT CHANGE UP (ref 3.5–5.3)
POTASSIUM SERPL-SCNC: 3.5 MMOL/L — SIGNIFICANT CHANGE UP (ref 3.5–5.3)
SARS-COV-2 IGG+IGM SERPL QL IA: 73.8 U/ML — HIGH
SARS-COV-2 IGG+IGM SERPL QL IA: POSITIVE
SODIUM SERPL-SCNC: 140 MMOL/L — SIGNIFICANT CHANGE UP (ref 135–145)

## 2021-11-22 PROCEDURE — 99221 1ST HOSP IP/OBS SF/LOW 40: CPT

## 2021-11-22 PROCEDURE — 93010 ELECTROCARDIOGRAM REPORT: CPT

## 2021-11-22 RX ADMIN — FENTANYL CITRATE 1 PATCH: 50 INJECTION INTRAVENOUS at 07:36

## 2021-11-22 RX ADMIN — MIRTAZAPINE 15 MILLIGRAM(S): 45 TABLET, ORALLY DISINTEGRATING ORAL at 21:29

## 2021-11-22 RX ADMIN — Medication 40 MILLIGRAM(S): at 05:12

## 2021-11-22 RX ADMIN — RIVAROXABAN 10 MILLIGRAM(S): KIT at 12:32

## 2021-11-22 RX ADMIN — HYDROMORPHONE HYDROCHLORIDE 2 MILLIGRAM(S): 2 INJECTION INTRAMUSCULAR; INTRAVENOUS; SUBCUTANEOUS at 23:44

## 2021-11-22 RX ADMIN — LOSARTAN POTASSIUM 25 MILLIGRAM(S): 100 TABLET, FILM COATED ORAL at 05:12

## 2021-11-22 RX ADMIN — PANTOPRAZOLE SODIUM 40 MILLIGRAM(S): 20 TABLET, DELAYED RELEASE ORAL at 05:12

## 2021-11-22 RX ADMIN — Medication 10 MILLIGRAM(S): at 05:12

## 2021-11-22 RX ADMIN — Medication 25 MILLIGRAM(S): at 05:12

## 2021-11-22 RX ADMIN — FENTANYL CITRATE 1 PATCH: 50 INJECTION INTRAVENOUS at 13:04

## 2021-11-22 RX ADMIN — Medication 10 MILLIGRAM(S): at 17:13

## 2021-11-22 RX ADMIN — FENTANYL CITRATE 1 PATCH: 50 INJECTION INTRAVENOUS at 12:26

## 2021-11-22 RX ADMIN — Medication 81 MILLIGRAM(S): at 12:32

## 2021-11-22 RX ADMIN — DULOXETINE HYDROCHLORIDE 60 MILLIGRAM(S): 30 CAPSULE, DELAYED RELEASE ORAL at 12:32

## 2021-11-22 RX ADMIN — FENTANYL CITRATE 1 PATCH: 50 INJECTION INTRAVENOUS at 20:08

## 2021-11-22 NOTE — CONSULT NOTE ADULT - SUBJECTIVE AND OBJECTIVE BOX
Chief Complaint:  Patient is a 81y old  Female who presents with a chief complaint of change of mental status (2021 06:44)    HPI:  CHIEF COMPLAINT:Patient is a 81y old  Female who presents with a chief complaint of     HPI:  80 yo f bilateral knee arthritis,  HTN, remote breast ca s/p mastectomy and LN resection, provoked PE , chronic pain, presents with inability to get out of bed and a stroke code was called on her. Pt with daughter states that last known well was 1 am, and states at 3 am, pt woke up and was unable to get out of bed with her walker as she normally does. She did not respond to her family members, was able to follow commands, but denies any slurring speech, or one-sided weakness. Daughter denies any n/v/f/c, cp. palpitations. Stroke code showed no acute findings.  pt apparently had disorientation and lethargy since early thi am , and no tresponding to verbal stimuli and being disoriented.  pt daughter claims as she was given lyrica, she has had moments of lethargy.  pt awake and alert now in ER.    Current out- patient pain regimen: Fentanyl TD patch 50 mcg/hr every 3 days, Dilaudid PO 2 mg up to 3 per day    Out Patient Pain Management provider: Dr. Scott Vanegas    [facility] Prescription Monitoring Program: Reference #[id number]    Opioid Risk Tool (ORT-OUD) Score: Low    Pain Score: 6/10    Pt seen lying in bed, appears comfortable. Pt c/o pain starting in right hip and radiating across low back and down B/L LE to toes. Pain in hip is burning pain. Pt also c/o chronic generalized discomfort in back 2/2 scoliosis and B/L knees R>L. Denies spasm. Pt had been on Lyrica prior to admission but reports it is too strong because 150 mg per day is a high number. Educated pt regarding dosing, numbers sounding high although not necessarily. Has never trialed Gabapentin, discussed differences, pt agreeable to trial Gabapentin.    REVIEW OF SYSTEMS:  CONSTITUTIONAL: No fever, weight loss, fatigue, falls  NEURO: No headaches, loss of strength, tremors, dizziness or blurred vision  RESP: No shortness of breath, cough  CV: No chest pain, palpitations  GI: No abdominal pain, nausea, vomiting, diarrhea, constipation, incontinence  : No urinary incontinence/retention  MSK: No joint pain or swelling; extremity pain per above   SKIN: No itching, burning, rashes, or lesions   PSYCHIATRIC: No depression, anxiety, mood swings, or difficulty sleeping      PHYSICAL EXAM  GENERAL: Seen at bedside, NAD, well-groomed, well-developed, appears stated age, no signs of toxicity  NEURO: Alert & Oriented X3, Good concentration; Follows commands  HEENT: Head atraumatic, normocephalic; speech clear and fluent  GI: Appetite good,  (+)BM  : Voiding without issue  EXTREMITIES: 2+ Peripheral Pulses, No cyanosis or edema; moving all extremities  MSK: Motor Strength 4/5 B/L upper and lower extremities; ambulates w/ walker   SKIN: No rashes or lesions  PSYCH: affect normal; good eye contact; no signs of depression or anxiety    PAST MEDICAL & SURGICAL HISTORY:  HTN (hypertension)    Scoliosis    Breast CA    H/O  section    History of hip replacement    H/O mastectomy, right    H/O: hysterectomy    Cataract    H/O shoulder surgery        FAMILY HISTORY:  Family history of breast cancer (Sibling)  sister living    Family history of Hodgkin&#x27;s disease (Sibling)  sisters - living    Family history of emphysema  father -     Family history of lung cancer  mother -         Allergies    No Known Allergies      MEDICATIONS  (STANDING):  aspirin enteric coated 81 milliGRAM(s) Oral daily  baclofen 10 milliGRAM(s) Oral two times a day  DULoxetine 60 milliGRAM(s) Oral daily  fentaNYL   Patch  50 MICROgram(s)/Hr 1 Patch Transdermal every 72 hours  furosemide    Tablet 40 milliGRAM(s) Oral daily  losartan 25 milliGRAM(s) Oral daily  metoprolol succinate ER 25 milliGRAM(s) Oral daily  mirtazapine 15 milliGRAM(s) Oral at bedtime  pantoprazole    Tablet 40 milliGRAM(s) Oral before breakfast  polyethylene glycol 3350 17 Gram(s) Oral two times a day  rivaroxaban 10 milliGRAM(s) Oral daily    MEDICATIONS  (PRN):  acetaminophen     Tablet .. 650 milliGRAM(s) Oral every 6 hours PRN Temp greater or equal to 38C (100.4F), Mild Pain (1 - 3)  HYDROmorphone   Tablet 2 milliGRAM(s) Oral two times a day PRN Moderate Pain (4 - 6)  melatonin 3 milliGRAM(s) Oral at bedtime PRN Insomnia      Vital Signs:  T(C): 36.7 (21 @ 11:25)  HR: 62 (21 @ 11:25)  BP: 112/72 (21 @ 11:25)  RR: 18 (21 @ 11:25)  SpO2: 95% (21 @ 11:25)    Pertinent labs/radiology:  Reviewed            140  |  104  |  17  ----------------------------<  117<H>  3.5   |  24  |  0.56    Ca    9.1      2021 10:40      < from: CT Abdomen and Pelvis w/ IV Cont (21 @ 16:34) >  Mild to moderate left hydronephrosis with upper pelvic ureteral calculus  Right lower lobe nodule. Suggest follow-up in one year    < from: Xray Chest 1 View- PORTABLE-Urgent (21 @ 12:23) >  Clear lungs.    < from: CT Angio Neck w/ IV Cont (21 @ 12:07) >  IMPRESSION: Age-appropriate involutional and ischemic gliotic changes progressed since 2010. No hemorrhage. Normal CTA of the head and neck. Normal CT perfusion.

## 2021-11-22 NOTE — PROGRESS NOTE ADULT - SUBJECTIVE AND OBJECTIVE BOX
CARDIOLOGY     PROGRESS  NOTE   ________________________________________________    CHIEF COMPLAINT:Patient is a 81y old  Female who presents with a chief complaint of change of mental status (21 Nov 2021 11:39)  doing better.  	  REVIEW OF SYSTEMS:  CONSTITUTIONAL: No fever, weight loss, or fatigue  EYES: No eye pain, visual disturbances, or discharge  ENT:  No difficulty hearing, tinnitus, vertigo; No sinus or throat pain  NECK: No pain or stiffness  RESPIRATORY: No cough, wheezing, chills or hemoptysis; No Shortness of Breath  CARDIOVASCULAR: No chest pain, palpitations, passing out, dizziness, or leg swelling  GASTROINTESTINAL: No abdominal or epigastric pain. No nausea, vomiting, or hematemesis; No diarrhea or constipation. No melena or hematochezia.  GENITOURINARY: No dysuria, frequency, hematuria, or incontinence  NEUROLOGICAL: No headaches, memory loss, loss of strength, numbness, or tremors  SKIN: No itching, burning, rashes, or lesions   LYMPH Nodes: No enlarged glands  ENDOCRINE: No heat or cold intolerance; No hair loss  MUSCULOSKELETAL: No joint pain or swelling; No muscle, back, or extremity pain  PSYCHIATRIC: No depression, anxiety, mood swings, or difficulty sleeping  HEME/LYMPH: No easy bruising, or bleeding gums  ALLERGY AND IMMUNOLOGIC: No hives or eczema	    [ ] All others negative	  [ ] Unable to obtain    PHYSICAL EXAM:  T(C): 36.8 (11-22-21 @ 04:32), Max: 36.8 (11-21-21 @ 14:30)  HR: 75 (11-22-21 @ 04:32) (70 - 80)  BP: 108/74 (11-22-21 @ 04:32) (104/70 - 127/73)  RR: 18 (11-22-21 @ 04:32) (16 - 18)  SpO2: 95% (11-22-21 @ 04:32) (95% - 97%)  Wt(kg): --  I&O's Summary    20 Nov 2021 07:01  -  21 Nov 2021 07:00  --------------------------------------------------------  IN: 474 mL / OUT: 800 mL / NET: -326 mL    21 Nov 2021 07:01  -  22 Nov 2021 06:45  --------------------------------------------------------  IN: 1170 mL / OUT: 200 mL / NET: 970 mL        Appearance: Normal	  HEENT:   Normal oral mucosa, PERRL, EOMI	  Lymphatic: No lymphadenopathy  Cardiovascular: Normal S1 S2, No JVD, + murmurs, No edema  Respiratory: Lungs clear to auscultation	  Psychiatry: A & O x 3, Mood & affect appropriate  Gastrointestinal:  Soft, Non-tender, + BS	  Skin: No rashes, No ecchymoses, No cyanosis	  Neurologic: Non-focal  Extremities: Normal range of motion, No clubbing, cyanosis or edema  Vascular: Peripheral pulses palpable 2+ bilaterally    MEDICATIONS  (STANDING):  aspirin enteric coated 81 milliGRAM(s) Oral daily  baclofen 10 milliGRAM(s) Oral two times a day  DULoxetine 60 milliGRAM(s) Oral daily  fentaNYL   Patch  50 MICROgram(s)/Hr 1 Patch Transdermal every 72 hours  furosemide    Tablet 40 milliGRAM(s) Oral daily  losartan 25 milliGRAM(s) Oral daily  metoprolol succinate ER 25 milliGRAM(s) Oral daily  mirtazapine 15 milliGRAM(s) Oral at bedtime  pantoprazole    Tablet 40 milliGRAM(s) Oral before breakfast  polyethylene glycol 3350 17 Gram(s) Oral two times a day  rivaroxaban 10 milliGRAM(s) Oral daily      TELEMETRY: 	    ECG:  	  RADIOLOGY:  OTHER: 	  	  LABS:	 	    CARDIAC MARKERS:  CARDIAC MARKERS ( 21 Nov 2021 13:51 )  x     / x     / 69 U/L / x     / 5.1 ng/mL                  proBNP: Serum Pro-Brain Natriuretic Peptide: 8165 pg/mL (11-21 @ 13:51)  Serum Pro-Brain Natriuretic Peptide: 2012 pg/mL (11-18 @ 14:58)  Serum Pro-Brain Natriuretic Peptide: 4144 pg/mL (11-18 @ 11:38)    Lipid Profile:   HgA1c:   TSH: Thyroid Stimulating Hormone, Serum: 1.85 uIU/mL (11-19 @ 08:53)    < from: TTE with Doppler (w/Cont) (11.19.21 @ 07:59) >  1. Mitral annular calcification, otherwise normal mitral  valve. Minimal mitral regurgitation.  2. Calcified trileaflet aortic valve with normal opening.  Minimal aortic regurgitation.  3. Endocardial visualization enhanced with intravenous  injection of Ultrasonic Enhancing Agent (Definity).  Severe  segmental left ventricular systolic dysfunction. No left  ventricular thrombus. The mid anterior septum, the mid  inferolateral wall, the mid anterior wall, the mid inferior  wall, the mid inferoseptum, and the apical cap are  hypokinetic. The apical inferior wall, the apical anterior  wall, the apical septum, and the apical lateral wall are  akinetic.  4. Severe reversible diastolic dysfunction (Stage III).      Assessment and plan  ---------------------------  82 yo f bilateral knee arthritis,  HTN, remote breast ca s/p mastectomy and LN resection, provoked PE 1993, chronic pain, presents with inability to get out of bed and a stroke code was called on her. Pt with daughter states that last known well was 1 am, and states at 3 am, pt woke up and was unable to get out of bed with her walker as she normally does. She did not respond to her family members, was able to follow commands, but denies any slurring speech, or one-sided weakness. Daughter denies any n/v/f/c, cp. palpitations. Stroke code showed no acute findings.  pt apparently had disorientation and lethargy since early thi am , and no responding  to verbal stimuli and being disoriented.  pt daughter claims as she was given Lyrica she has had moments of lethargy.  pt awake and alert now in ER.  change of mental status seen by neuro  r/o seizure disorder check EEG  continue all pain meds, hold Lyrica  continue ac  check labs  urology  appreciated  pain management, awaiting consult called   echo results noted, will start coreg/ losartan/ severe lv dysfunction r/o cad ? TAKASUBO  ?cath will discuss with pt and family  awaiting EEG/ MRI if no contraindication  will consider cardiac cath ?cardiac ct after neuro work up

## 2021-11-22 NOTE — PHYSICAL THERAPY INITIAL EVALUATION ADULT - ACTIVE RANGE OF MOTION EXAMINATION, REHAB EVAL
except R knee flexion 0-90 from old injury. Pt also has significant LLD with RLE shorter than LLE; pt has a shoe with homemade lifts to accommodate the distance; L genu valgum, arthritic changes in feet; + thoracic kyphosis/bilateral upper extremity Active ROM was WFL (within functional limits)/bilateral  lower extremity Active ROM was WFL (within functional limits)

## 2021-11-22 NOTE — CONSULT NOTE ADULT - ASSESSMENT
81F PMHx bilateral knee arthritis, HTN, remote breast ca s/p mastectomy and LN resection, provoked PE 1993, chronic pain    Admitted w/ AMS    Out- patient pain regimen: Fentanyl TD patch 50 mcg/hr every 3 days, Dilaudid PO 2 mg up to 3 per day, Pt reports Lyrica 75 mg BID (not on )  Out Patient Pain Management provider: Dr. Scott Vanegas    Pt c/o pain starting in right hip and radiating across low back and down B/L LE to toes. Pain in hip is burning pain. Pt also c/o chronic generalized discomfort in back 2/2 scoliosis and B/L knees R>L. Denies spasm. pt agreeable to trial Gabapentin.    Plan discussed with primary team:  Continue Fentanyl TD 50 mcg/hr every 3 days  Continue Dilaudid 2 mg every 8 hours PRN severe pain (pt aware she must request it)  Start Gabapentin 100 mg every 12 hours (CrCl = 65); has less bioavailability than Lyrica, pt may prefer it  Lidoderm daily to right hip  Warm/cool packs for comfort  Bowel Regimen  Incentive Spirometer  PT per primary team  Monitor for sedation, respiratory depression  Follow up with Dr. Scott Vanegas for continued pain management after discharge  Narcan Rescue Kit on discharge (Naloxone 4 mg/0.1 ml nasal spray - 1 spray q 2-3 minutes alternating between nostrils) for this elderly woman on high dose opioids    Signing off    Time spent on encounter:    30    Minutes    Chronic Pain Service  [phone] 81F PMHx bilateral knee arthritis, HTN, remote breast ca s/p mastectomy and LN resection, provoked PE 1993, chronic pain    Admitted w/ AMS    Out- patient pain regimen: Fentanyl TD patch 50 mcg/hr every 3 days, Dilaudid PO 2 mg up to 3 per day, Pt reports Lyrica 75 mg BID (not on )  Out Patient Pain Management provider: Dr. Scott Vanegas    Pt c/o pain starting in right hip and radiating across low back and down B/L LE to toes. Pain in hip is burning pain. Pt also c/o chronic generalized discomfort in back 2/2 scoliosis and B/L knees R>L. Denies spasm. pt agreeable to trial Gabapentin.    Plan discussed with primary team:  Continue Fentanyl TD 50 mcg/hr every 3 days  Continue Dilaudid 2 mg every 8 hours PRN severe pain (pt aware she must request it)  Start Gabapentin 100 mg every 12 hours (CrCl = 65); has less bioavailability than Lyrica, pt may prefer it  ADDENDUM 11/22/2021 1500 - Hold gabapentinoids 2/2 pt with Bradycardia  Lidoderm daily to right hip  Warm/cool packs for comfort  Bowel Regimen  Incentive Spirometer  PT per primary team  Monitor for sedation, respiratory depression  Follow up with Dr. Scott Vanegas for continued pain management after discharge  Narcan Rescue Kit on discharge (Naloxone 4 mg/0.1 ml nasal spray - 1 spray q 2-3 minutes alternating between nostrils) for this elderly woman on high dose opioids    Signing off    Time spent on encounter:    30    Minutes    Chronic Pain Service  [phone]

## 2021-11-22 NOTE — PHYSICAL THERAPY INITIAL EVALUATION ADULT - PLANNED THERAPY INTERVENTIONS, PT EVAL
Stairs Goal: Pt will go up/down 19 steps with + handrail and min assist x 1 in 4 weeks./balance training/bed mobility training/gait training/strengthening/transfer training

## 2021-11-22 NOTE — PHYSICAL THERAPY INITIAL EVALUATION ADULT - PERTINENT HX OF CURRENT PROBLEM, REHAB EVAL
81F michelle knee arthritis,  HTN, remote breast ca s/p mastectomy and LN resection, provoked PE 1993, chronic pain, p/w inability to get OOB and a stroke code was called on her. Pt with daughter states that last known well was 1 am, and states at 3 am, pt woke up and was unable to get out of bed with her walker. She did not respond to her family members, was able to follow commands, but denies any slurring speech, or one-sided weakness.

## 2021-11-22 NOTE — PHYSICAL THERAPY INITIAL EVALUATION ADULT - PRECAUTIONS/LIMITATIONS, REHAB EVAL
TTE: Mitral annular calcification, otherwise normal mitral valve. Min MR. 2. Calcified trileaflet aortic valve with normal opening. Min AR. 3. Endocardial visualization enhanced with intravenous injection of Ultrasonic Enhancing Agent (Definity).  Severe segmental LV systolic dysfunction. No LV thrombus. The mid ant septum, the mid inferolateral wall, the mid ant wall, the mid inferior wall, the mid inferoseptum, and the apical cap are hypokinetic. The apical inferior wall, the apical ant wall, the apical septum, and the apical lateral wall are akinetic. 4. Severe reversible diastolic dysfunction (Stage III)./cardiac precautions

## 2021-11-22 NOTE — PROVIDER CONTACT NOTE (OTHER) - ASSESSMENT
Patient is asymptomatic. Patient is Alert and Oriented times Four. Patient denies chest pain, discomfort, shortness of breath, dizziness and lightheadedness. Patient's Heart Rate is 50's - 80's on the cardiac monitor and Heart Rhythm is Sinus Bradycardia / Normal Sinus Rhythm on the cardiac monitor. Patient is asymptomatic. Patient is Alert and Oriented times Four. Patient denies chest pain, discomfort, shortness of breath, dizziness and lightheadedness. Patient's Heart Rate is 50's - 80's on the cardiac monitor and Heart Rhythm is Sinus Bradycardia / Normal Sinus Rhythm on the cardiac monitor. Patient's Vital Signs: Temperature 98 F Oral, Heart Rate 62, Blood Pressure 107/70, Respiratory Rate 18 and O2 Saturation 95% Room Air.

## 2021-11-22 NOTE — PHYSICAL THERAPY INITIAL EVALUATION ADULT - GAIT DEVIATIONS NOTED, PT EVAL
decreased yue/increased time in double stance/decreased step length/decreased stride length/decreased weight-shifting ability

## 2021-11-22 NOTE — PHYSICAL THERAPY INITIAL EVALUATION ADULT - ASR WT BEARING STATUS EVAL
CT Perfusion:  Age-appropriate involutional and ischemic gliotic changes progressed since 7/12/2010. No hemorrhage. Normal CTA of the head and neck. Normal CT perfusion. CT AP: Mild to moderate left hydronephrosis with upper pelvic ureteral calculus. RLL nodule. Suggest follow-up in one year./no weight-bearing restrictions

## 2021-11-22 NOTE — PHYSICAL THERAPY INITIAL EVALUATION ADULT - ADDITIONAL COMMENTS
Pt lives in an apartment with her spouse, daughter and sister in law. There are 19 steps to apt, with + handrail.

## 2021-11-23 LAB
CREATININE, URINE RESULT: 44 MG/DL — SIGNIFICANT CHANGE UP
PROT ?TM UR-MCNC: 8 MG/DL — SIGNIFICANT CHANGE UP (ref 0–12)

## 2021-11-23 RX ADMIN — Medication 40 MILLIGRAM(S): at 05:28

## 2021-11-23 RX ADMIN — HYDROMORPHONE HYDROCHLORIDE 2 MILLIGRAM(S): 2 INJECTION INTRAMUSCULAR; INTRAVENOUS; SUBCUTANEOUS at 21:22

## 2021-11-23 RX ADMIN — FENTANYL CITRATE 1 PATCH: 50 INJECTION INTRAVENOUS at 18:57

## 2021-11-23 RX ADMIN — Medication 10 MILLIGRAM(S): at 17:15

## 2021-11-23 RX ADMIN — PANTOPRAZOLE SODIUM 40 MILLIGRAM(S): 20 TABLET, DELAYED RELEASE ORAL at 05:29

## 2021-11-23 RX ADMIN — FENTANYL CITRATE 1 PATCH: 50 INJECTION INTRAVENOUS at 06:35

## 2021-11-23 RX ADMIN — DULOXETINE HYDROCHLORIDE 60 MILLIGRAM(S): 30 CAPSULE, DELAYED RELEASE ORAL at 09:44

## 2021-11-23 RX ADMIN — Medication 81 MILLIGRAM(S): at 09:45

## 2021-11-23 RX ADMIN — Medication 25 MILLIGRAM(S): at 05:29

## 2021-11-23 RX ADMIN — RIVAROXABAN 10 MILLIGRAM(S): KIT at 09:45

## 2021-11-23 RX ADMIN — LOSARTAN POTASSIUM 25 MILLIGRAM(S): 100 TABLET, FILM COATED ORAL at 05:28

## 2021-11-23 RX ADMIN — HYDROMORPHONE HYDROCHLORIDE 2 MILLIGRAM(S): 2 INJECTION INTRAMUSCULAR; INTRAVENOUS; SUBCUTANEOUS at 20:20

## 2021-11-23 RX ADMIN — Medication 10 MILLIGRAM(S): at 05:28

## 2021-11-23 RX ADMIN — MIRTAZAPINE 15 MILLIGRAM(S): 45 TABLET, ORALLY DISINTEGRATING ORAL at 22:14

## 2021-11-23 RX ADMIN — HYDROMORPHONE HYDROCHLORIDE 2 MILLIGRAM(S): 2 INJECTION INTRAMUSCULAR; INTRAVENOUS; SUBCUTANEOUS at 01:14

## 2021-11-23 RX ADMIN — POLYETHYLENE GLYCOL 3350 17 GRAM(S): 17 POWDER, FOR SOLUTION ORAL at 06:14

## 2021-11-23 NOTE — PROGRESS NOTE ADULT - SUBJECTIVE AND OBJECTIVE BOX
CARDIOLOGY     PROGRESS  NOTE   ________________________________________________    CHIEF COMPLAINT:Patient is a 81y old  Female who presents with a chief complaint of change of mental status (22 Nov 2021 14:26)  no complain.  	  REVIEW OF SYSTEMS:  CONSTITUTIONAL: No fever, weight loss, or fatigue  EYES: No eye pain, visual disturbances, or discharge  ENT:  No difficulty hearing, tinnitus, vertigo; No sinus or throat pain  NECK: No pain or stiffness  RESPIRATORY: No cough, wheezing, chills or hemoptysis; No Shortness of Breath  CARDIOVASCULAR: No chest pain, palpitations, passing out, dizziness, or leg swelling  GASTROINTESTINAL: No abdominal or epigastric pain. No nausea, vomiting, or hematemesis; No diarrhea or constipation. No melena or hematochezia.  GENITOURINARY: No dysuria, frequency, hematuria, or incontinence  NEUROLOGICAL: No headaches, memory loss, loss of strength, numbness, or tremors  SKIN: No itching, burning, rashes, or lesions   LYMPH Nodes: No enlarged glands  ENDOCRINE: No heat or cold intolerance; No hair loss  MUSCULOSKELETAL: No joint pain or swelling; No muscle, back, or extremity pain  PSYCHIATRIC: No depression, anxiety, mood swings, or difficulty sleeping  HEME/LYMPH: No easy bruising, or bleeding gums  ALLERGY AND IMMUNOLOGIC: No hives or eczema	    [ ] All others negative	  [ ] Unable to obtain    PHYSICAL EXAM:  T(C): 36.7 (11-23-21 @ 04:32), Max: 36.9 (11-22-21 @ 22:07)  HR: 70 (11-23-21 @ 04:32) (62 - 86)  BP: 117/72 (11-23-21 @ 04:32) (107/70 - 138/74)  RR: 18 (11-23-21 @ 04:32) (18 - 18)  SpO2: 96% (11-23-21 @ 04:32) (95% - 96%)  Wt(kg): --  I&O's Summary    22 Nov 2021 07:01  -  23 Nov 2021 07:00  --------------------------------------------------------  IN: 760 mL / OUT: 2200 mL / NET: -1440 mL        Appearance: Normal	  HEENT:   Normal oral mucosa, PERRL, EOMI	  Lymphatic: No lymphadenopathy  Cardiovascular: Normal S1 S2, No JVD, + murmurs, No edema  Respiratory: Lungs clear to auscultation	  Psychiatry: A & O x 3, Mood & affect appropriate  Gastrointestinal:  Soft, Non-tender, + BS	  Skin: No rashes, No ecchymoses, No cyanosis	  Neurologic: Non-focal  Extremities: Normal range of motion, No clubbing, cyanosis or edema  Vascular: Peripheral pulses palpable 2+ bilaterally    MEDICATIONS  (STANDING):  aspirin enteric coated 81 milliGRAM(s) Oral daily  baclofen 10 milliGRAM(s) Oral two times a day  DULoxetine 60 milliGRAM(s) Oral daily  fentaNYL   Patch  50 MICROgram(s)/Hr 1 Patch Transdermal every 72 hours  furosemide    Tablet 40 milliGRAM(s) Oral daily  losartan 25 milliGRAM(s) Oral daily  metoprolol succinate ER 25 milliGRAM(s) Oral daily  mirtazapine 15 milliGRAM(s) Oral at bedtime  pantoprazole    Tablet 40 milliGRAM(s) Oral before breakfast  polyethylene glycol 3350 17 Gram(s) Oral two times a day  rivaroxaban 10 milliGRAM(s) Oral daily      TELEMETRY: 	    ECG:  	  RADIOLOGY:  OTHER: 	  	  LABS:	 	    CARDIAC MARKERS:  CARDIAC MARKERS ( 21 Nov 2021 13:51 )  x     / x     / 69 U/L / x     / 5.1 ng/mL            11-22    140  |  104  |  17  ----------------------------<  117<H>  3.5   |  24  |  0.56    Ca    9.1      22 Nov 2021 10:40      proBNP: Serum Pro-Brain Natriuretic Peptide: 8165 pg/mL (11-21 @ 13:51)  Serum Pro-Brain Natriuretic Peptide: 2012 pg/mL (11-18 @ 14:58)  Serum Pro-Brain Natriuretic Peptide: 4144 pg/mL (11-18 @ 11:38)    Lipid Profile:   HgA1c:   TSH: Thyroid Stimulating Hormone, Serum: 1.85 uIU/mL (11-19 @ 08:53)    < from: 12 Lead ECG (11.22.21 @ 07:42) >  Diagnosis Line NORMAL SINUS RHYTHM  SEPTAL INFARCT , AGE UNDETERMINED  ABNORMAL ECG  WHEN COMPARED WITH ECG OF 18-NOV-2021 11:56,  NO  SIGNIFICANT CHANGES HAVE OCCURRED    < end of copied text >        Assessment and plan  ---------------------------  82 yo f bilateral knee arthritis,  HTN, remote breast ca s/p mastectomy and LN resection, provoked PE 1993, chronic pain, presents with inability to get out of bed and a stroke code was called on her. Pt with daughter states that last known well was 1 am, and states at 3 am, pt woke up and was unable to get out of bed with her walker as she normally does. She did not respond to her family members, was able to follow commands, but denies any slurring speech, or one-sided weakness. Daughter denies any n/v/f/c, cp. palpitations. Stroke code showed no acute findings.  pt apparently had disorientation and lethargy since early thi am , and no responding  to verbal stimuli and being disoriented.  pt daughter claims as she was given Lyrica she has had moments of lethargy.  pt awake and alert now in ER.  change of mental status seen by neuro  r/o seizure disorder check EEG  continue all pain meds, hold Lyrica  continue ac  check labs  urology  appreciated  pain management, awaiting consult called   echo results noted, will start coreg/ losartan/ severe lv dysfunction r/o cad ? TAKASUBO  ?cath will discuss with pt and family  awaiting EEG/ MRI if no contraindication  cardiac ct   still awaiting EEG

## 2021-11-24 PROCEDURE — 95816 EEG AWAKE AND DROWSY: CPT | Mod: 26

## 2021-11-24 PROCEDURE — 75574 CT ANGIO HRT W/3D IMAGE: CPT | Mod: 26

## 2021-11-24 RX ADMIN — Medication 650 MILLIGRAM(S): at 04:08

## 2021-11-24 RX ADMIN — Medication 81 MILLIGRAM(S): at 11:46

## 2021-11-24 RX ADMIN — RIVAROXABAN 10 MILLIGRAM(S): KIT at 11:46

## 2021-11-24 RX ADMIN — Medication 650 MILLIGRAM(S): at 00:12

## 2021-11-24 RX ADMIN — Medication 650 MILLIGRAM(S): at 18:00

## 2021-11-24 RX ADMIN — Medication 40 MILLIGRAM(S): at 05:21

## 2021-11-24 RX ADMIN — FENTANYL CITRATE 1 PATCH: 50 INJECTION INTRAVENOUS at 18:56

## 2021-11-24 RX ADMIN — MIRTAZAPINE 15 MILLIGRAM(S): 45 TABLET, ORALLY DISINTEGRATING ORAL at 22:46

## 2021-11-24 RX ADMIN — HYDROMORPHONE HYDROCHLORIDE 2 MILLIGRAM(S): 2 INJECTION INTRAMUSCULAR; INTRAVENOUS; SUBCUTANEOUS at 15:20

## 2021-11-24 RX ADMIN — Medication 10 MILLIGRAM(S): at 17:29

## 2021-11-24 RX ADMIN — Medication 10 MILLIGRAM(S): at 05:21

## 2021-11-24 RX ADMIN — Medication 25 MILLIGRAM(S): at 05:21

## 2021-11-24 RX ADMIN — Medication 650 MILLIGRAM(S): at 17:29

## 2021-11-24 RX ADMIN — FENTANYL CITRATE 1 PATCH: 50 INJECTION INTRAVENOUS at 08:41

## 2021-11-24 RX ADMIN — PANTOPRAZOLE SODIUM 40 MILLIGRAM(S): 20 TABLET, DELAYED RELEASE ORAL at 05:21

## 2021-11-24 RX ADMIN — DULOXETINE HYDROCHLORIDE 60 MILLIGRAM(S): 30 CAPSULE, DELAYED RELEASE ORAL at 11:46

## 2021-11-24 RX ADMIN — LOSARTAN POTASSIUM 25 MILLIGRAM(S): 100 TABLET, FILM COATED ORAL at 05:21

## 2021-11-24 RX ADMIN — HYDROMORPHONE HYDROCHLORIDE 2 MILLIGRAM(S): 2 INJECTION INTRAMUSCULAR; INTRAVENOUS; SUBCUTANEOUS at 16:20

## 2021-11-24 NOTE — PROGRESS NOTE ADULT - SUBJECTIVE AND OBJECTIVE BOX
CARDIOLOGY     PROGRESS  NOTE   ________________________________________________    CHIEF COMPLAINT:Patient is a 81y old  Female who presents with a chief complaint of change of mental status (23 Nov 2021 08:18)  no complain.  	  REVIEW OF SYSTEMS:  CONSTITUTIONAL: No fever, weight loss, or fatigue  EYES: No eye pain, visual disturbances, or discharge  ENT:  No difficulty hearing, tinnitus, vertigo; No sinus or throat pain  NECK: No pain or stiffness  RESPIRATORY: No cough, wheezing, chills or hemoptysis; no  Shortness of Breath  CARDIOVASCULAR: No chest pain, palpitations, passing out, dizziness, or leg swelling  GASTROINTESTINAL: No abdominal or epigastric pain. No nausea, vomiting, or hematemesis; No diarrhea or constipation. No melena or hematochezia.  GENITOURINARY: No dysuria, frequency, hematuria, or incontinence  NEUROLOGICAL: No headaches, memory loss, loss of strength, numbness, or tremors  SKIN: No itching, burning, rashes, or lesions   LYMPH Nodes: No enlarged glands  ENDOCRINE: No heat or cold intolerance; No hair loss  MUSCULOSKELETAL: No joint pain or swelling; No muscle, back, or extremity pain  PSYCHIATRIC: No depression, anxiety, mood swings, or difficulty sleeping  HEME/LYMPH: No easy bruising, or bleeding gums  ALLERGY AND IMMUNOLOGIC: No hives or eczema	    [ ] All others negative	  [ ] Unable to obtain    PHYSICAL EXAM:  T(C): 36.8 (11-24-21 @ 04:29), Max: 37.1 (11-23-21 @ 20:47)  HR: 69 (11-24-21 @ 04:29) (60 - 75)  BP: 111/70 (11-24-21 @ 04:29) (109/72 - 127/77)  RR: 18 (11-24-21 @ 04:29) (18 - 18)  SpO2: 95% (11-24-21 @ 04:29) (94% - 98%)  Wt(kg): --  I&O's Summary    23 Nov 2021 07:01  -  24 Nov 2021 07:00  --------------------------------------------------------  IN: 300 mL / OUT: 1350 mL / NET: -1050 mL        Appearance: Normal	  HEENT:   Normal oral mucosa, PERRL, EOMI	  Lymphatic: No lymphadenopathy  Cardiovascular: Normal S1 S2, No JVD, + murmurs, No edema  Respiratory: Lungs clear to auscultation	  Psychiatry: A & O x 3, Mood & affect appropriate  Gastrointestinal:  Soft, Non-tender, + BS	  Skin: No rashes, No ecchymoses, No cyanosis	  Neurologic: Non-focal  Extremities: Normal range of motion, No clubbing, cyanosis or edema  Vascular: Peripheral pulses palpable 2+ bilaterally    MEDICATIONS  (STANDING):  aspirin enteric coated 81 milliGRAM(s) Oral daily  baclofen 10 milliGRAM(s) Oral two times a day  DULoxetine 60 milliGRAM(s) Oral daily  fentaNYL   Patch  50 MICROgram(s)/Hr 1 Patch Transdermal every 72 hours  furosemide    Tablet 40 milliGRAM(s) Oral daily  losartan 25 milliGRAM(s) Oral daily  metoprolol succinate ER 25 milliGRAM(s) Oral daily  mirtazapine 15 milliGRAM(s) Oral at bedtime  pantoprazole    Tablet 40 milliGRAM(s) Oral before breakfast  polyethylene glycol 3350 17 Gram(s) Oral two times a day  rivaroxaban 10 milliGRAM(s) Oral daily      TELEMETRY: 	    ECG:  	  RADIOLOGY:  OTHER: 	  	  LABS:	 	    CARDIAC MARKERS:            11-22    140  |  104  |  17  ----------------------------<  117<H>  3.5   |  24  |  0.56    Ca    9.1      22 Nov 2021 10:40      proBNP: Serum Pro-Brain Natriuretic Peptide: 8165 pg/mL (11-21 @ 13:51)  Serum Pro-Brain Natriuretic Peptide: 2012 pg/mL (11-18 @ 14:58)  Serum Pro-Brain Natriuretic Peptide: 4144 pg/mL (11-18 @ 11:38)    Lipid Profile:   HgA1c:   TSH: Thyroid Stimulating Hormone, Serum: 1.85 uIU/mL (11-19 @ 08:53)      < from: Xray Chest 1 View- PORTABLE-Urgent (11.18.21 @ 12:23) >  The lungs are clear.  There is no pneumothorax or large pleural effusion.  Heart size cannot be accurately assessed in this projection.  No acute osseous abnormality.    IMPRESSION:    Clear lungs.        Assessment and plan  ---------------------------  80 yo f bilateral knee arthritis,  HTN, remote breast ca s/p mastectomy and LN resection, provoked PE 1993, chronic pain, presents with inability to get out of bed and a stroke code was called on her. Pt with daughter states that last known well was 1 am, and states at 3 am, pt woke up and was unable to get out of bed with her walker as she normally does. She did not respond to her family members, was able to follow commands, but denies any slurring speech, or one-sided weakness. Daughter denies any n/v/f/c, cp. palpitations. Stroke code showed no acute findings.  pt apparently had disorientation and lethargy since early thi am , and no responding  to verbal stimuli and being disoriented.  pt daughter claims as she was given Lyrica she has had moments of lethargy.  pt awake and alert now in ER.  change of mental status seen by neuro  r/o seizure disorder check EEG  continue all pain meds, hold Lyrica  continue ac  check labs  urology  appreciated  pain management, awaiting consult called   echo results noted, will start coreg/ losartan/ severe lv dysfunction r/o cad ? TAKASUBO  ?cath will discuss with pt and family, pt refuses cath, decided on cardiac ct  awaiting EEG/ MRI refusing  cardiac ct  ordered  still awaiting EEG

## 2021-11-25 LAB
ANION GAP SERPL CALC-SCNC: 14 MMOL/L — SIGNIFICANT CHANGE UP (ref 5–17)
BUN SERPL-MCNC: 19 MG/DL — SIGNIFICANT CHANGE UP (ref 7–23)
CALCIUM SERPL-MCNC: 9.3 MG/DL — SIGNIFICANT CHANGE UP (ref 8.4–10.5)
CHLORIDE SERPL-SCNC: 100 MMOL/L — SIGNIFICANT CHANGE UP (ref 96–108)
CO2 SERPL-SCNC: 25 MMOL/L — SIGNIFICANT CHANGE UP (ref 22–31)
CREAT SERPL-MCNC: 0.68 MG/DL — SIGNIFICANT CHANGE UP (ref 0.5–1.3)
GLUCOSE SERPL-MCNC: 66 MG/DL — LOW (ref 70–99)
POTASSIUM SERPL-MCNC: 3.7 MMOL/L — SIGNIFICANT CHANGE UP (ref 3.5–5.3)
POTASSIUM SERPL-SCNC: 3.7 MMOL/L — SIGNIFICANT CHANGE UP (ref 3.5–5.3)
SARS-COV-2 RNA SPEC QL NAA+PROBE: SIGNIFICANT CHANGE UP
SODIUM SERPL-SCNC: 139 MMOL/L — SIGNIFICANT CHANGE UP (ref 135–145)

## 2021-11-25 RX ADMIN — PANTOPRAZOLE SODIUM 40 MILLIGRAM(S): 20 TABLET, DELAYED RELEASE ORAL at 05:13

## 2021-11-25 RX ADMIN — Medication 10 MILLIGRAM(S): at 17:18

## 2021-11-25 RX ADMIN — Medication 81 MILLIGRAM(S): at 11:55

## 2021-11-25 RX ADMIN — FENTANYL CITRATE 1 PATCH: 50 INJECTION INTRAVENOUS at 14:58

## 2021-11-25 RX ADMIN — Medication 40 MILLIGRAM(S): at 05:14

## 2021-11-25 RX ADMIN — Medication 10 MILLIGRAM(S): at 05:13

## 2021-11-25 RX ADMIN — MIRTAZAPINE 15 MILLIGRAM(S): 45 TABLET, ORALLY DISINTEGRATING ORAL at 21:16

## 2021-11-25 RX ADMIN — FENTANYL CITRATE 1 PATCH: 50 INJECTION INTRAVENOUS at 14:52

## 2021-11-25 RX ADMIN — FENTANYL CITRATE 1 PATCH: 50 INJECTION INTRAVENOUS at 06:50

## 2021-11-25 RX ADMIN — HYDROMORPHONE HYDROCHLORIDE 2 MILLIGRAM(S): 2 INJECTION INTRAMUSCULAR; INTRAVENOUS; SUBCUTANEOUS at 13:19

## 2021-11-25 RX ADMIN — Medication 650 MILLIGRAM(S): at 19:16

## 2021-11-25 RX ADMIN — LOSARTAN POTASSIUM 25 MILLIGRAM(S): 100 TABLET, FILM COATED ORAL at 05:14

## 2021-11-25 RX ADMIN — Medication 650 MILLIGRAM(S): at 17:57

## 2021-11-25 RX ADMIN — DULOXETINE HYDROCHLORIDE 60 MILLIGRAM(S): 30 CAPSULE, DELAYED RELEASE ORAL at 11:55

## 2021-11-25 RX ADMIN — Medication 25 MILLIGRAM(S): at 08:58

## 2021-11-25 RX ADMIN — FENTANYL CITRATE 1 PATCH: 50 INJECTION INTRAVENOUS at 19:33

## 2021-11-25 RX ADMIN — RIVAROXABAN 10 MILLIGRAM(S): KIT at 11:55

## 2021-11-25 RX ADMIN — HYDROMORPHONE HYDROCHLORIDE 2 MILLIGRAM(S): 2 INJECTION INTRAMUSCULAR; INTRAVENOUS; SUBCUTANEOUS at 12:30

## 2021-11-25 NOTE — EEG REPORT - NS EEG TEXT BOX
[facility]   COMPREHENSIVE EPILEPSY CENTER   REPORT OF ROUTINE VIDEO EEG     [facility]: [address] [place] [address] , [address], [place] [address], Ph#: [phone]  [facility]: [phone] [place] [address], Ph#: [phone]  [facility]: [address] [place] [address], Ph#: [phone]    Patient Name: SHIRLEY ROWLAND  Age and : 81y (40)  MRN #: [id number]  Location: [address]   Referring Physician: Nydia Jaramillo    Study Date: 21    _____________________________________________________________  TECHNICAL INFORMATION    Placement and Labeling of Electrodes:  The EEG was performed utilizing 20 channels referential EEG connections (coronal over temporal over parasagittal montage) using all standard 10-20 electrode placements with EKG.  Recording was at a sampling rate of 256 samples per second per channel.  Time synchronized digital video recording was done simultaneously with EEG recording.  A low light infrared camera was used for low light recording.  Cristian and seizure detection algorithms were utilized.    _____________________________________________________________  HISTORY    Patient is a 81y old  Female who presents with a chief complaint of change of mental status (2021 09:34)      PERTINENT MEDICATION:  MEDICATIONS  (STANDING):  aspirin enteric coated 81 milliGRAM(s) Oral daily  baclofen 10 milliGRAM(s) Oral two times a day  DULoxetine 60 milliGRAM(s) Oral daily  fentaNYL   Patch  50 MICROgram(s)/Hr 1 Patch Transdermal every 72 hours  furosemide    Tablet 40 milliGRAM(s) Oral daily  losartan 25 milliGRAM(s) Oral daily  metoprolol succinate ER 25 milliGRAM(s) Oral daily  mirtazapine 15 milliGRAM(s) Oral at bedtime  pantoprazole    Tablet 40 milliGRAM(s) Oral before breakfast  polyethylene glycol 3350 17 Gram(s) Oral two times a day  rivaroxaban 10 milliGRAM(s) Oral daily    _____________________________________________________________  STUDY INTERPRETATION    Findings: The background was continuous and reactive. During wakefulness, the posterior dominant rhythm consisted of symmetric, well-modulated 8 Hz activity, with amplitude to 30 uV, that attenuated to eye opening.      Background Slowing:  Intermittent theta range activity.    Focal Slowing:   none    Sleep Background:  Drowsiness was characterized by fragmentation, attenuation, and slowing of the background activity.    Stage II sleep transients were not recorded.    Other Non-Epileptiform Findings:  None were present.    Interictal Epileptiform Activity:   None were present.    Events:  Clinical events: None recorded.  Seizures: None recorded.    Activation Procedures:   Hyperventilation was not performed.    Photic stimulation was not performed.     Artifacts:  Intermittent myogenic and movement artifacts were noted.    ECG:  The heart rate on single channel ECG was predominantly between 60-70 BPM.    _____________________________________________________________  **EEG SUMMARY/CLASSIFICATION**    Abnormal EEG in the awake and drowsy states.    - Background slowing, generalized.    _____________________________________________________________  **EEG IMPRESSION/CLINICAL CORRELATE**    Abnormal EEG study.    - Mild nonspecific diffuse or multifocal cerebral dysfunction.   - No epileptiform pattern or seizure recorded.    _____________________________________________________________    Randall Hoffmann MD, WOODY  Fellow | [facility]  [place] [facility]     [facility]   COMPREHENSIVE EPILEPSY CENTER   REPORT OF ROUTINE VIDEO EEG     [facility]: [address] [place] [address] , [address], [place] [address], Ph#: [phone]  [facility]: [phone] [place] [address], Ph#: [phone]  [facility]: [address] [place] [address], Ph#: [phone]    Patient Name: SHIRLEY ROWLAND  Age and : 81y (40)  MRN #: [id number]  Location: [address]   Referring Physician: Nydia Jaramillo    Study Date: 21    _____________________________________________________________  TECHNICAL INFORMATION    Placement and Labeling of Electrodes:  The EEG was performed utilizing 20 channels referential EEG connections (coronal over temporal over parasagittal montage) using all standard 10-20 electrode placements with EKG.  Recording was at a sampling rate of 256 samples per second per channel.  Time synchronized digital video recording was done simultaneously with EEG recording.  A low light infrared camera was used for low light recording.  Cristian and seizure detection algorithms were utilized.    _____________________________________________________________  HISTORY    Patient is a 81y old  Female who presents with a chief complaint of change of mental status (2021 09:34)      PERTINENT MEDICATION:  MEDICATIONS  (STANDING):  aspirin enteric coated 81 milliGRAM(s) Oral daily  baclofen 10 milliGRAM(s) Oral two times a day  DULoxetine 60 milliGRAM(s) Oral daily  fentaNYL   Patch  50 MICROgram(s)/Hr 1 Patch Transdermal every 72 hours  furosemide    Tablet 40 milliGRAM(s) Oral daily  losartan 25 milliGRAM(s) Oral daily  metoprolol succinate ER 25 milliGRAM(s) Oral daily  mirtazapine 15 milliGRAM(s) Oral at bedtime  pantoprazole    Tablet 40 milliGRAM(s) Oral before breakfast  polyethylene glycol 3350 17 Gram(s) Oral two times a day  rivaroxaban 10 milliGRAM(s) Oral daily    _____________________________________________________________  STUDY INTERPRETATION    Findings: The background was continuous and reactive. During wakefulness, the posterior dominant rhythm consisted of symmetric, well-modulated 8 Hz activity, with amplitude to 30 uV, that attenuated to eye opening.      Background Slowing:  Intermittent theta range activity.    Focal Slowing:   none    Sleep Background:  Drowsiness was characterized by fragmentation, attenuation, and slowing of the background activity.    Stage II sleep transients were not recorded.    Other Non-Epileptiform Findings:  None were present.    Interictal Epileptiform Activity:   None were present.    Events:  Clinical events: None recorded.  Seizures: None recorded.    Activation Procedures:   Hyperventilation was not performed.    Photic stimulation was not performed.     Artifacts:  Intermittent myogenic and movement artifacts were noted.    ECG:  The heart rate on single channel ECG was predominantly between 60-70 BPM.    _____________________________________________________________  **EEG SUMMARY/CLASSIFICATION**    Abnormal EEG in the awake and drowsy states.    - Background slowing, generalized.    _____________________________________________________________  **EEG IMPRESSION/CLINICAL CORRELATE**    Abnormal EEG study.    - Mild nonspecific diffuse or multifocal cerebral dysfunction.    - No epileptiform pattern or seizure recorded.    _____________________________________________________________    Randall Hoffmann MD, WOODY  Fellow | [facility]  [place] [facility] Neuro[facility]

## 2021-11-25 NOTE — PROGRESS NOTE ADULT - SUBJECTIVE AND OBJECTIVE BOX
CARDIOLOGY     PROGRESS  NOTE   ________________________________________________    CHIEF COMPLAINT:Patient is a 81y old  Female who presents with a chief complaint of change of mental status (24 Nov 2021 08:29)  no complain.  	  REVIEW OF SYSTEMS:  CONSTITUTIONAL: No fever, weight loss, or fatigue  EYES: No eye pain, visual disturbances, or discharge  ENT:  No difficulty hearing, tinnitus, vertigo; No sinus or throat pain  NECK: No pain or stiffness  RESPIRATORY: No cough, wheezing, chills or hemoptysis; No Shortness of Breath  CARDIOVASCULAR: No chest pain, palpitations, passing out, dizziness, or leg swelling  GASTROINTESTINAL: No abdominal or epigastric pain. No nausea, vomiting, or hematemesis; No diarrhea or constipation. No melena or hematochezia.  GENITOURINARY: No dysuria, frequency, hematuria, or incontinence  NEUROLOGICAL: No headaches, memory loss, loss of strength, numbness, or tremors  SKIN: No itching, burning, rashes, or lesions   LYMPH Nodes: No enlarged glands  ENDOCRINE: No heat or cold intolerance; No hair loss  MUSCULOSKELETAL: No joint pain or swelling; No muscle, back, or extremity pain  PSYCHIATRIC: No depression, anxiety, mood swings, or difficulty sleeping  HEME/LYMPH: No easy bruising, or bleeding gums  ALLERGY AND IMMUNOLOGIC: No hives or eczema	    [ ] All others negative	  [ ] Unable to obtain    PHYSICAL EXAM:  T(C): 36.6 (11-25-21 @ 08:59), Max: 36.8 (11-24-21 @ 20:57)  HR: 90 (11-25-21 @ 08:59) (58 - 90)  BP: 125/79 (11-25-21 @ 08:59) (94/58 - 125/79)  RR: 18 (11-25-21 @ 08:59) (17 - 18)  SpO2: 96% (11-25-21 @ 08:59) (95% - 96%)  Wt(kg): --  I&O's Summary    24 Nov 2021 07:01  -  25 Nov 2021 07:00  --------------------------------------------------------  IN: 480 mL / OUT: 1500 mL / NET: -1020 mL        Appearance: Normal	  HEENT:   Normal oral mucosa, PERRL, EOMI	  Lymphatic: No lymphadenopathy  Cardiovascular: Normal S1 S2, No JVD, + murmurs, No edema  Respiratory: Lungs clear to auscultation	  Psychiatry: A & O x 3, Mood & affect appropriate  Gastrointestinal:  Soft, Non-tender, + BS	  Skin: No rashes, No ecchymoses, No cyanosis	  Neurologic: Non-focal  Extremities: Normal range of motion, No clubbing, cyanosis or edema  Vascular: Peripheral pulses palpable 2+ bilaterally    MEDICATIONS  (STANDING):  aspirin enteric coated 81 milliGRAM(s) Oral daily  baclofen 10 milliGRAM(s) Oral two times a day  DULoxetine 60 milliGRAM(s) Oral daily  fentaNYL   Patch  50 MICROgram(s)/Hr 1 Patch Transdermal every 72 hours  furosemide    Tablet 40 milliGRAM(s) Oral daily  losartan 25 milliGRAM(s) Oral daily  metoprolol succinate ER 25 milliGRAM(s) Oral daily  mirtazapine 15 milliGRAM(s) Oral at bedtime  pantoprazole    Tablet 40 milliGRAM(s) Oral before breakfast  polyethylene glycol 3350 17 Gram(s) Oral two times a day  rivaroxaban 10 milliGRAM(s) Oral daily      TELEMETRY: 	    ECG:  	  RADIOLOGY:  OTHER: 	  	  LABS:	 	    CARDIAC MARKERS:            11-25    139  |  100  |  19  ----------------------------<  66<L>  3.7   |  25  |  0.68    Ca    9.3      25 Nov 2021 06:43      proBNP: Serum Pro-Brain Natriuretic Peptide: 8165 pg/mL (11-21 @ 13:51)  Serum Pro-Brain Natriuretic Peptide: 2012 pg/mL (11-18 @ 14:58)  Serum Pro-Brain Natriuretic Peptide: 4144 pg/mL (11-18 @ 11:38)    Lipid Profile:   HgA1c:   TSH: Thyroid Stimulating Hormone, Serum: 1.85 uIU/mL (11-19 @ 08:53)    < from: CT Angio Heart and Coronaries w/ IV Cont (11.24.21 @ 13:50) >  1. Left main has partially calcified plaque with mild (<50%) stenosis and proximal LAD has  partially calcified plaque with possibly severe stenosis, blooming artifact from calcium and motion preclude accurateassessment of luminal stenoses. Mid and distal LAD have calcified plaques with indeterminate stenoses. Distal Lcx and mid RCA have calcified plaques with indeterminate stenoses. R-PDA and RPLB have indeterminate stenosis (limited evaluation due to artifact.)  Rest of findings as detailed above.    2. Agatston calcium score of 511, which is at 79th percentile for individuals of the same age, gender, and race/ethnicity who are free of clinical cardiovascular disease and treated diabetes by the MESAcalculator.    3. Descending aorta is dilated measuring 29.3 mm x 28.4 mm. Focal sequential areas of ectasia of the descending aorta measuring 35.5mmx 29.5mm.    4. No potentially significant noncardiac findings.    5. Findings discussed with Dr. Jaramillo 11/24/2021.    < end of copied text >        Assessment and plan  ---------------------------  82 yo f bilateral knee arthritis,  HTN, remote breast ca s/p mastectomy and LN resection, provoked PE 1993, chronic pain, presents with inability to get out of bed and a stroke code was called on her. Pt with daughter states that last known well was 1 am, and states at 3 am, pt woke up and was unable to get out of bed with her walker as she normally does. She did not respond to her family members, was able to follow commands, but denies any slurring speech, or one-sided weakness. Daughter denies any n/v/f/c, cp. palpitations. Stroke code showed no acute findings.  pt apparently had disorientation and lethargy since early thi am , and no responding  to verbal stimuli and being disoriented.  pt daughter claims as she was given Lyrica she has had moments of lethargy.  pt awake and alert now in ER.  change of mental status seen by neuro  r/o seizure disorder check EEG  continue all pain meds, hold Lyrica  continue ac  check labs  urology  appreciated  pain management, awaiting consult called   echo results noted, will start coreg/ losartan/ severe lv dysfunction r/o cad ? TAKASUBO  ?cath will discuss with pt and family, pt refuses cath, decided on cardiac ct  awaiting EEG/ MRI refusing  cardiac ct  results discussed with Dr Elizalde, appreciated, will scheduled for cath tomorrow  still awaiting EEG

## 2021-11-26 LAB
ANION GAP SERPL CALC-SCNC: 13 MMOL/L — SIGNIFICANT CHANGE UP (ref 5–17)
BUN SERPL-MCNC: 23 MG/DL — SIGNIFICANT CHANGE UP (ref 7–23)
CALCIUM SERPL-MCNC: 9.1 MG/DL — SIGNIFICANT CHANGE UP (ref 8.4–10.5)
CHLORIDE SERPL-SCNC: 102 MMOL/L — SIGNIFICANT CHANGE UP (ref 96–108)
CO2 SERPL-SCNC: 22 MMOL/L — SIGNIFICANT CHANGE UP (ref 22–31)
CREAT SERPL-MCNC: 0.77 MG/DL — SIGNIFICANT CHANGE UP (ref 0.5–1.3)
GLUCOSE SERPL-MCNC: 66 MG/DL — LOW (ref 70–99)
POTASSIUM SERPL-MCNC: 3.9 MMOL/L — SIGNIFICANT CHANGE UP (ref 3.5–5.3)
POTASSIUM SERPL-SCNC: 3.9 MMOL/L — SIGNIFICANT CHANGE UP (ref 3.5–5.3)
SODIUM SERPL-SCNC: 137 MMOL/L — SIGNIFICANT CHANGE UP (ref 135–145)

## 2021-11-26 PROCEDURE — 99152 MOD SED SAME PHYS/QHP 5/>YRS: CPT

## 2021-11-26 PROCEDURE — 93454 CORONARY ARTERY ANGIO S&I: CPT | Mod: 26

## 2021-11-26 RX ORDER — ACETAMINOPHEN 500 MG
1000 TABLET ORAL ONCE
Refills: 0 | Status: COMPLETED | OUTPATIENT
Start: 2021-11-26 | End: 2021-11-26

## 2021-11-26 RX ORDER — FENTANYL CITRATE 50 UG/ML
1 INJECTION INTRAVENOUS
Refills: 0 | Status: DISCONTINUED | OUTPATIENT
Start: 2021-11-26 | End: 2021-11-29

## 2021-11-26 RX ORDER — HYDROMORPHONE HYDROCHLORIDE 2 MG/ML
2 INJECTION INTRAMUSCULAR; INTRAVENOUS; SUBCUTANEOUS
Refills: 0 | Status: DISCONTINUED | OUTPATIENT
Start: 2021-11-26 | End: 2021-11-29

## 2021-11-26 RX ADMIN — POLYETHYLENE GLYCOL 3350 17 GRAM(S): 17 POWDER, FOR SOLUTION ORAL at 05:49

## 2021-11-26 RX ADMIN — PANTOPRAZOLE SODIUM 40 MILLIGRAM(S): 20 TABLET, DELAYED RELEASE ORAL at 05:49

## 2021-11-26 RX ADMIN — MIRTAZAPINE 15 MILLIGRAM(S): 45 TABLET, ORALLY DISINTEGRATING ORAL at 22:35

## 2021-11-26 RX ADMIN — DULOXETINE HYDROCHLORIDE 60 MILLIGRAM(S): 30 CAPSULE, DELAYED RELEASE ORAL at 08:56

## 2021-11-26 RX ADMIN — Medication 10 MILLIGRAM(S): at 17:43

## 2021-11-26 RX ADMIN — Medication 25 MILLIGRAM(S): at 17:44

## 2021-11-26 RX ADMIN — POLYETHYLENE GLYCOL 3350 17 GRAM(S): 17 POWDER, FOR SOLUTION ORAL at 17:44

## 2021-11-26 RX ADMIN — LOSARTAN POTASSIUM 25 MILLIGRAM(S): 100 TABLET, FILM COATED ORAL at 05:50

## 2021-11-26 RX ADMIN — FENTANYL CITRATE 1 PATCH: 50 INJECTION INTRAVENOUS at 20:37

## 2021-11-26 RX ADMIN — HYDROMORPHONE HYDROCHLORIDE 2 MILLIGRAM(S): 2 INJECTION INTRAMUSCULAR; INTRAVENOUS; SUBCUTANEOUS at 20:52

## 2021-11-26 RX ADMIN — Medication 81 MILLIGRAM(S): at 08:56

## 2021-11-26 RX ADMIN — HYDROMORPHONE HYDROCHLORIDE 2 MILLIGRAM(S): 2 INJECTION INTRAMUSCULAR; INTRAVENOUS; SUBCUTANEOUS at 20:13

## 2021-11-26 RX ADMIN — FENTANYL CITRATE 1 PATCH: 50 INJECTION INTRAVENOUS at 07:31

## 2021-11-26 RX ADMIN — Medication 40 MILLIGRAM(S): at 05:49

## 2021-11-26 RX ADMIN — Medication 10 MILLIGRAM(S): at 05:49

## 2021-11-26 NOTE — PROVIDER CONTACT NOTE (OTHER) - SITUATION
Notified by telemetry technician that patient's HR was sustaining 45-46.
Pt. complaining of sharp shooting pains on her right wrist, pt. s/p cath today through [facility].
Patient's Heart Rate decreased briefly to 48 on the cardiac monitor and Heart Rhythm was Sinus Bradycardia on the cardiac monitor at that time.
Pt states "not feeling well"

## 2021-11-26 NOTE — PROVIDER CONTACT NOTE (OTHER) - BACKGROUND
Pt admitted for unresponsiveness s/p code stroke.
Pt. admitted with unresponsiveness s/p code stroke in ED; CTH/CTA H/N negative; likely toxic metabolic
Patient admitted for Fatigue, Lethargy, Delirium.
80 yo f bilateral knee arthritis,  HTN, remote breast ca s/p mastectomy and LN resection, provoked PE 1993, chronic pain, presents with inability to get out of bed and a stroke code was called on her.

## 2021-11-26 NOTE — PROVIDER CONTACT NOTE (OTHER) - ACTION/TREATMENT ORDERED:
Jennifer Ham made aware. Give Dilaudid PRN for pain and monitor cath site. Will continue to monitor patient.
Jennifer Ham made aware. Push metoprolol succinate ER 25mg to 0800 and endorse with day team. Pt. is ok to receive losartan and lasix for the AM. Will continue to monitor.
Provider aware, will come to evaluate.
NP aware & assessed patient & reviewed all orders, labs, history & plan. NP consulting with MD regarding medication. NP ordered to continue to monitor.

## 2021-11-26 NOTE — PROGRESS NOTE ADULT - SUBJECTIVE AND OBJECTIVE BOX
CARDIOLOGY     PROGRESS  NOTE   ________________________________________________    CHIEF COMPLAINT:Patient is a 81y old  Female who presents with a chief complaint of change of mental status (25 Nov 2021 09:34)  no complain.  	  REVIEW OF SYSTEMS:  CONSTITUTIONAL: No fever, weight loss, or fatigue  EYES: No eye pain, visual disturbances, or discharge  ENT:  No difficulty hearing, tinnitus, vertigo; No sinus or throat pain  NECK: No pain or stiffness  RESPIRATORY: No cough, wheezing, chills or hemoptysis; +Shortness of Breath  CARDIOVASCULAR: No chest pain, palpitations, passing out, dizziness, or leg swelling  GASTROINTESTINAL: No abdominal or epigastric pain. No nausea, vomiting, or hematemesis; No diarrhea or constipation. No melena or hematochezia.  GENITOURINARY: No dysuria, frequency, hematuria, or incontinence  NEUROLOGICAL: No headaches, memory loss, loss of strength, numbness, or tremors  SKIN: No itching, burning, rashes, or lesions   LYMPH Nodes: No enlarged glands  ENDOCRINE: No heat or cold intolerance; No hair loss  MUSCULOSKELETAL: No joint pain or swelling; No muscle, back, or extremity pain  PSYCHIATRIC: No depression, anxiety, mood swings, or difficulty sleeping  HEME/LYMPH: No easy bruising, or bleeding gums  ALLERGY AND IMMUNOLOGIC: No hives or eczema	    [ ] All others negative	  [ ] Unable to obtain    PHYSICAL EXAM:  T(C): 36.6 (11-26-21 @ 04:00), Max: 36.7 (11-25-21 @ 12:26)  HR: 61 (11-26-21 @ 04:00) (61 - 90)  BP: 110/74 (11-26-21 @ 04:00) (95/64 - 125/79)  RR: 18 (11-26-21 @ 04:00) (18 - 18)  SpO2: 95% (11-26-21 @ 04:00) (95% - 96%)  Wt(kg): --  I&O's Summary    25 Nov 2021 07:01  -  26 Nov 2021 07:00  --------------------------------------------------------  IN: 470 mL / OUT: 900 mL / NET: -430 mL        Appearance: Normal	  HEENT:   Normal oral mucosa, PERRL, EOMI	  Lymphatic: No lymphadenopathy  Cardiovascular: Normal S1 S2, No JVD, + murmurs, No edema  Respiratory:rhonchi	  Psychiatry: A & O x 3, Mood & affect appropriate  Gastrointestinal:  Soft, Non-tender, + BS	  Skin: No rashes, No ecchymoses, No cyanosis	  Neurologic: Non-focal  Extremities: Normal range of motion, No clubbing, cyanosis or edema  Vascular: Peripheral pulses palpable 2+ bilaterally    MEDICATIONS  (STANDING):  aspirin enteric coated 81 milliGRAM(s) Oral daily  baclofen 10 milliGRAM(s) Oral two times a day  DULoxetine 60 milliGRAM(s) Oral daily  fentaNYL   Patch  50 MICROgram(s)/Hr 1 Patch Transdermal every 72 hours  furosemide    Tablet 40 milliGRAM(s) Oral daily  losartan 25 milliGRAM(s) Oral daily  metoprolol succinate ER 25 milliGRAM(s) Oral daily  mirtazapine 15 milliGRAM(s) Oral at bedtime  pantoprazole    Tablet 40 milliGRAM(s) Oral before breakfast  polyethylene glycol 3350 17 Gram(s) Oral two times a day  rivaroxaban 10 milliGRAM(s) Oral daily      TELEMETRY: 	    ECG:  	  RADIOLOGY:  OTHER: 	  	  LABS:	 	    CARDIAC MARKERS:            11-26    137  |  102  |  23  ----------------------------<  66<L>  3.9   |  22  |  0.77    Ca    9.1      26 Nov 2021 07:07      proBNP: Serum Pro-Brain Natriuretic Peptide: 8165 pg/mL (11-21 @ 13:51)  Serum Pro-Brain Natriuretic Peptide: 2012 pg/mL (11-18 @ 14:58)  Serum Pro-Brain Natriuretic Peptide: 4144 pg/mL (11-18 @ 11:38)    Lipid Profile:   HgA1c:   TSH: Thyroid Stimulating Hormone, Serum: 1.85 uIU/mL (11-19 @ 08:53)          Assessment and plan  ---------------------------  82 yo f bilateral knee arthritis,  HTN, remote breast ca s/p mastectomy and LN resection, provoked PE 1993, chronic pain, presents with inability to get out of bed and a stroke code was called on her. Pt with daughter states that last known well was 1 am, and states at 3 am, pt woke up and was unable to get out of bed with her walker as she normally does. She did not respond to her family members, was able to follow commands, but denies any slurring speech, or one-sided weakness. Daughter denies any n/v/f/c, cp. palpitations. Stroke code showed no acute findings.  pt apparently had disorientation and lethargy since early thi am , and no responding  to verbal stimuli and being disoriented.  pt daughter claims as she was given Lyrica she has had moments of lethargy.  pt awake and alert now in ER.  change of mental status seen by neuro  r/o seizure disorder check EEG  continue all pain meds, hold Lyrica  continue ac  check labs  urology  appreciated  pain management, awaiting consult called   echo results noted, will start coreg/ losartan/ severe lv dysfunction r/o cad ? TAKASUBO  ?cath will discuss with pt and family, pt refuses cath, decided on cardiac ct  awaiting EEG/ MRI refusing  cardiac ct  results discussed with Dr Elizalde, appreciated, will scheduled for cath today  still awaiting EEG

## 2021-11-26 NOTE — PROVIDER CONTACT NOTE (OTHER) - ASSESSMENT
Pt. A&Ox4, states she has "sharp shooting pain" on her right wrist where the cardiac cath was done today and rates it 6/10. Pulses are weak, but palpable (+1), extremities are warm to touch. Pt. denies numbness/tingling. Pt. has equal strength in both hands.

## 2021-11-26 NOTE — PROVIDER CONTACT NOTE (OTHER) - ASSESSMENT
Pt. was asleep at the time. Denies chest pain, lightheadedness, dizziness, increased fatigue. After being woken up, patient's HR is back to baseline of 50-60s. VSS. Afebrile. BP: 110/74, HR: 61. SpO2: 95% on room air.

## 2021-11-27 LAB
% GAMMA, URINE: 12.4 % — SIGNIFICANT CHANGE UP
ALBUMIN 24H MFR UR ELPH: 17.1 % — SIGNIFICANT CHANGE UP
ALPHA1 GLOB 24H MFR UR ELPH: 28.2 % — SIGNIFICANT CHANGE UP
ALPHA2 GLOB 24H MFR UR ELPH: 23 % — SIGNIFICANT CHANGE UP
ANION GAP SERPL CALC-SCNC: 15 MMOL/L — SIGNIFICANT CHANGE UP (ref 5–17)
B-GLOBULIN 24H MFR UR ELPH: 19.3 % — SIGNIFICANT CHANGE UP
BUN SERPL-MCNC: 19 MG/DL — SIGNIFICANT CHANGE UP (ref 7–23)
CALCIUM SERPL-MCNC: 8.9 MG/DL — SIGNIFICANT CHANGE UP (ref 8.4–10.5)
CHLORIDE SERPL-SCNC: 101 MMOL/L — SIGNIFICANT CHANGE UP (ref 96–108)
CO2 SERPL-SCNC: 22 MMOL/L — SIGNIFICANT CHANGE UP (ref 22–31)
COLLECT DURATION TIME UR: 24 HR — SIGNIFICANT CHANGE UP
CREAT SERPL-MCNC: 0.58 MG/DL — SIGNIFICANT CHANGE UP (ref 0.5–1.3)
GLUCOSE SERPL-MCNC: 76 MG/DL — SIGNIFICANT CHANGE UP (ref 70–99)
HCT VFR BLD CALC: 37.4 % — SIGNIFICANT CHANGE UP (ref 34.5–45)
HGB BLD-MCNC: 11.7 G/DL — SIGNIFICANT CHANGE UP (ref 11.5–15.5)
INTERPRETATION 24H UR IFE-IMP: SIGNIFICANT CHANGE UP
INTERPRETATION 24H UR IFE-IMP: SIGNIFICANT CHANGE UP
M PROTEIN 24H UR ELPH-MRATE: 0 MG/24HR — SIGNIFICANT CHANGE UP (ref 0–0)
M PROTEIN 24H UR ELPH-MRATE: 0 MG/DL — SIGNIFICANT CHANGE UP
M PROTEIN 24H UR ELPH-MRATE: SIGNIFICANT CHANGE UP
MCHC RBC-ENTMCNC: 27.9 PG — SIGNIFICANT CHANGE UP (ref 27–34)
MCHC RBC-ENTMCNC: 31.3 GM/DL — LOW (ref 32–36)
MCV RBC AUTO: 89 FL — SIGNIFICANT CHANGE UP (ref 80–100)
NRBC # BLD: 0 /100 WBCS — SIGNIFICANT CHANGE UP (ref 0–0)
PLATELET # BLD AUTO: 283 K/UL — SIGNIFICANT CHANGE UP (ref 150–400)
POTASSIUM SERPL-MCNC: 3.5 MMOL/L — SIGNIFICANT CHANGE UP (ref 3.5–5.3)
POTASSIUM SERPL-SCNC: 3.5 MMOL/L — SIGNIFICANT CHANGE UP (ref 3.5–5.3)
PROT ?TM UR-MCNC: 8 MG/DL — SIGNIFICANT CHANGE UP (ref 0–12)
PROT PATTERN 24H UR ELPH-IMP: SIGNIFICANT CHANGE UP
PROTEIN QUANT CALC, URINE: 84 MG/24 H — SIGNIFICANT CHANGE UP (ref 50–100)
RBC # BLD: 4.2 M/UL — SIGNIFICANT CHANGE UP (ref 3.8–5.2)
RBC # FLD: 14 % — SIGNIFICANT CHANGE UP (ref 10.3–14.5)
SODIUM SERPL-SCNC: 138 MMOL/L — SIGNIFICANT CHANGE UP (ref 135–145)
TOTAL VOLUME - 24 HOUR: 1050 ML — SIGNIFICANT CHANGE UP
URINE CREATININE CALCULATION: 0.5 G/24 H — LOW (ref 0.8–1.8)
WBC # BLD: 7.16 K/UL — SIGNIFICANT CHANGE UP (ref 3.8–10.5)
WBC # FLD AUTO: 7.16 K/UL — SIGNIFICANT CHANGE UP (ref 3.8–10.5)

## 2021-11-27 RX ORDER — SPIRONOLACTONE 25 MG/1
25 TABLET, FILM COATED ORAL DAILY
Refills: 0 | Status: DISCONTINUED | OUTPATIENT
Start: 2021-11-27 | End: 2021-11-29

## 2021-11-27 RX ADMIN — Medication 25 MILLIGRAM(S): at 05:56

## 2021-11-27 RX ADMIN — RIVAROXABAN 10 MILLIGRAM(S): KIT at 17:24

## 2021-11-27 RX ADMIN — LOSARTAN POTASSIUM 25 MILLIGRAM(S): 100 TABLET, FILM COATED ORAL at 05:56

## 2021-11-27 RX ADMIN — POLYETHYLENE GLYCOL 3350 17 GRAM(S): 17 POWDER, FOR SOLUTION ORAL at 17:24

## 2021-11-27 RX ADMIN — Medication 10 MILLIGRAM(S): at 05:55

## 2021-11-27 RX ADMIN — MIRTAZAPINE 15 MILLIGRAM(S): 45 TABLET, ORALLY DISINTEGRATING ORAL at 21:42

## 2021-11-27 RX ADMIN — Medication 40 MILLIGRAM(S): at 05:55

## 2021-11-27 RX ADMIN — FENTANYL CITRATE 1 PATCH: 50 INJECTION INTRAVENOUS at 18:44

## 2021-11-27 RX ADMIN — DULOXETINE HYDROCHLORIDE 60 MILLIGRAM(S): 30 CAPSULE, DELAYED RELEASE ORAL at 11:55

## 2021-11-27 RX ADMIN — Medication 81 MILLIGRAM(S): at 12:09

## 2021-11-27 RX ADMIN — Medication 10 MILLIGRAM(S): at 17:24

## 2021-11-27 RX ADMIN — HYDROMORPHONE HYDROCHLORIDE 2 MILLIGRAM(S): 2 INJECTION INTRAMUSCULAR; INTRAVENOUS; SUBCUTANEOUS at 17:28

## 2021-11-27 RX ADMIN — PANTOPRAZOLE SODIUM 40 MILLIGRAM(S): 20 TABLET, DELAYED RELEASE ORAL at 05:56

## 2021-11-27 RX ADMIN — FENTANYL CITRATE 1 PATCH: 50 INJECTION INTRAVENOUS at 07:30

## 2021-11-27 RX ADMIN — SPIRONOLACTONE 25 MILLIGRAM(S): 25 TABLET, FILM COATED ORAL at 11:54

## 2021-11-27 NOTE — PROGRESS NOTE ADULT - SUBJECTIVE AND OBJECTIVE BOX
CARDIOLOGY     PROGRESS  NOTE   ________________________________________________    CHIEF COMPLAINT:Patient is a 81y old  Female who presents with a chief complaint of change of mental status (26 Nov 2021 08:21)  no complain.  	  REVIEW OF SYSTEMS:  CONSTITUTIONAL: No fever, weight loss, or fatigue  EYES: No eye pain, visual disturbances, or discharge  ENT:  No difficulty hearing, tinnitus, vertigo; No sinus or throat pain  NECK: No pain or stiffness  RESPIRATORY: No cough, wheezing, chills or hemoptysis; No Shortness of Breath  CARDIOVASCULAR: No chest pain, palpitations, passing out, dizziness, or leg swelling  GASTROINTESTINAL: No abdominal or epigastric pain. No nausea, vomiting, or hematemesis; No diarrhea or constipation. No melena or hematochezia.  GENITOURINARY: No dysuria, frequency, hematuria, or incontinence  NEUROLOGICAL: No headaches, memory loss, loss of strength, numbness, or tremors  SKIN: No itching, burning, rashes, or lesions   LYMPH Nodes: No enlarged glands  ENDOCRINE: No heat or cold intolerance; No hair loss  MUSCULOSKELETAL: No joint pain or swelling; No muscle, back, or extremity pain  PSYCHIATRIC: No depression, anxiety, mood swings, or difficulty sleeping  HEME/LYMPH: No easy bruising, or bleeding gums  ALLERGY AND IMMUNOLOGIC: No hives or eczema	    [ ] All others negative	  [ ] Unable to obtain    PHYSICAL EXAM:  T(C): 36.5 (11-27-21 @ 04:50), Max: 37.1 (11-26-21 @ 21:00)  HR: 64 (11-27-21 @ 06:02) (63 - 83)  BP: 129/75 (11-27-21 @ 06:02) (94/49 - 130/86)  RR: 18 (11-27-21 @ 04:50) (16 - 18)  SpO2: 95% (11-27-21 @ 04:50) (94% - 100%)  Wt(kg): --  I&O's Summary    26 Nov 2021 07:01  -  27 Nov 2021 07:00  --------------------------------------------------------  IN: 300 mL / OUT: 650 mL / NET: -350 mL    27 Nov 2021 07:01  -  27 Nov 2021 09:50  --------------------------------------------------------  IN: 180 mL / OUT: 0 mL / NET: 180 mL        Appearance: Normal	  HEENT:   Normal oral mucosa, PERRL, EOMI	  Lymphatic: No lymphadenopathy  Cardiovascular: Normal S1 S2, No JVD, + murmurs, No edema  Respiratory: rhonchi  Psychiatry: A & O x 3, Mood & affect appropriate  Gastrointestinal:  Soft, Non-tender, + BS	  Skin: No rashes, No ecchymoses, No cyanosis	  Neurologic: Non-focal  Extremities: Normal range of motion, No clubbing, cyanosis or edema  Vascular: Peripheral pulses palpable 2+ bilaterally    MEDICATIONS  (STANDING):  aspirin enteric coated 81 milliGRAM(s) Oral daily  baclofen 10 milliGRAM(s) Oral two times a day  DULoxetine 60 milliGRAM(s) Oral daily  fentaNYL   Patch  50 MICROgram(s)/Hr 1 Patch Transdermal every 72 hours  furosemide    Tablet 40 milliGRAM(s) Oral daily  losartan 25 milliGRAM(s) Oral daily  metoprolol succinate ER 25 milliGRAM(s) Oral daily  mirtazapine 15 milliGRAM(s) Oral at bedtime  pantoprazole    Tablet 40 milliGRAM(s) Oral before breakfast  polyethylene glycol 3350 17 Gram(s) Oral two times a day  rivaroxaban 10 milliGRAM(s) Oral daily      TELEMETRY: 	    ECG:  	  RADIOLOGY:  OTHER: 	  	  LABS:	 	    CARDIAC MARKERS:                                11.7   7.16  )-----------( 283      ( 27 Nov 2021 06:47 )             37.4     11-27    138  |  101  |  19  ----------------------------<  76  3.5   |  22  |  0.58    Ca    8.9      27 Nov 2021 06:46      proBNP: Serum Pro-Brain Natriuretic Peptide: 8165 pg/mL (11-21 @ 13:51)  Serum Pro-Brain Natriuretic Peptide: 2012 pg/mL (11-18 @ 14:58)  Serum Pro-Brain Natriuretic Peptide: 4144 pg/mL (11-18 @ 11:38)    Lipid Profile:   HgA1c:   TSH: Thyroid Stimulating Hormone, Serum: 1.85 uIU/mL (11-19 @ 08:53)    < from: Cardiac Catheterization (11.26.21 @ 12:57) >  Coronary Angiography   LAD   Left anterior descending artery: Angiography shows mild  atherosclerosis.    CX   Circumflex: Angiography shows mild atherosclerosis.      RCA   Right coronary artery: Angiography shows moderate atherosclerosis.        Assessment and plan  ---------------------------  80 yo f bilateral knee arthritis,  HTN, remote breast ca s/p mastectomy and LN resection, provoked PE 1993, chronic pain, presents with inability to get out of bed and a stroke code was called on her. Pt with daughter states that last known well was 1 am, and states at 3 am, pt woke up and was unable to get out of bed with her walker as she normally does. She did not respond to her family members, was able to follow commands, but denies any slurring speech, or one-sided weakness. Daughter denies any n/v/f/c, cp. palpitations. Stroke code showed no acute findings.  pt apparently had disorientation and lethargy since early thi am , and no responding  to verbal stimuli and being disoriented.  pt daughter claims as she was given Lyrica she has had moments of lethargy.  pt awake and alert now in ER.  change of mental status seen by neuro  r/o seizure disorder check EEG  continue all pain meds, hold Lyrica  check labs  urology  appreciated  echo results noted, will start coreg/ losartan/ severe lv dysfunction r/o cad ? TAKASUBO  ?cath will discuss with pt and family, pt refuses cath, decided on cardiac ct  awaiting EEG/ MRI refusing  cardiac ct  results discussed with Dr Elizalde, appreciated, cardiac cath noted, risk factor modification  still awaiting EEG

## 2021-11-28 LAB
ANION GAP SERPL CALC-SCNC: 13 MMOL/L — SIGNIFICANT CHANGE UP (ref 5–17)
BUN SERPL-MCNC: 19 MG/DL — SIGNIFICANT CHANGE UP (ref 7–23)
CALCIUM SERPL-MCNC: 8.7 MG/DL — SIGNIFICANT CHANGE UP (ref 8.4–10.5)
CHLORIDE SERPL-SCNC: 101 MMOL/L — SIGNIFICANT CHANGE UP (ref 96–108)
CO2 SERPL-SCNC: 28 MMOL/L — SIGNIFICANT CHANGE UP (ref 22–31)
CREAT SERPL-MCNC: 0.68 MG/DL — SIGNIFICANT CHANGE UP (ref 0.5–1.3)
GLUCOSE SERPL-MCNC: 97 MG/DL — SIGNIFICANT CHANGE UP (ref 70–99)
MAGNESIUM SERPL-MCNC: 2 MG/DL — SIGNIFICANT CHANGE UP (ref 1.6–2.6)
POTASSIUM SERPL-MCNC: 3.2 MMOL/L — LOW (ref 3.5–5.3)
POTASSIUM SERPL-SCNC: 3.2 MMOL/L — LOW (ref 3.5–5.3)
PROT SERPL-MCNC: 6.8 G/DL — SIGNIFICANT CHANGE UP (ref 6–8.3)
PROT SERPL-MCNC: 6.8 G/DL — SIGNIFICANT CHANGE UP (ref 6–8.3)
SODIUM SERPL-SCNC: 142 MMOL/L — SIGNIFICANT CHANGE UP (ref 135–145)

## 2021-11-28 RX ORDER — POTASSIUM CHLORIDE 20 MEQ
40 PACKET (EA) ORAL EVERY 4 HOURS
Refills: 0 | Status: COMPLETED | OUTPATIENT
Start: 2021-11-28 | End: 2021-11-28

## 2021-11-28 RX ORDER — POTASSIUM CHLORIDE 20 MEQ
40 PACKET (EA) ORAL EVERY 4 HOURS
Refills: 0 | Status: DISCONTINUED | OUTPATIENT
Start: 2021-11-28 | End: 2021-11-28

## 2021-11-28 RX ADMIN — Medication 5 MILLIGRAM(S): at 21:19

## 2021-11-28 RX ADMIN — Medication 81 MILLIGRAM(S): at 11:59

## 2021-11-28 RX ADMIN — POLYETHYLENE GLYCOL 3350 17 GRAM(S): 17 POWDER, FOR SOLUTION ORAL at 17:03

## 2021-11-28 RX ADMIN — PANTOPRAZOLE SODIUM 40 MILLIGRAM(S): 20 TABLET, DELAYED RELEASE ORAL at 05:19

## 2021-11-28 RX ADMIN — RIVAROXABAN 10 MILLIGRAM(S): KIT at 17:02

## 2021-11-28 RX ADMIN — Medication 40 MILLIGRAM(S): at 05:19

## 2021-11-28 RX ADMIN — Medication 10 MILLIGRAM(S): at 05:19

## 2021-11-28 RX ADMIN — DULOXETINE HYDROCHLORIDE 60 MILLIGRAM(S): 30 CAPSULE, DELAYED RELEASE ORAL at 11:59

## 2021-11-28 RX ADMIN — LOSARTAN POTASSIUM 25 MILLIGRAM(S): 100 TABLET, FILM COATED ORAL at 05:19

## 2021-11-28 RX ADMIN — FENTANYL CITRATE 1 PATCH: 50 INJECTION INTRAVENOUS at 09:23

## 2021-11-28 RX ADMIN — Medication 25 MILLIGRAM(S): at 05:19

## 2021-11-28 RX ADMIN — MIRTAZAPINE 15 MILLIGRAM(S): 45 TABLET, ORALLY DISINTEGRATING ORAL at 21:19

## 2021-11-28 RX ADMIN — FENTANYL CITRATE 1 PATCH: 50 INJECTION INTRAVENOUS at 18:08

## 2021-11-28 RX ADMIN — HYDROMORPHONE HYDROCHLORIDE 2 MILLIGRAM(S): 2 INJECTION INTRAMUSCULAR; INTRAVENOUS; SUBCUTANEOUS at 18:09

## 2021-11-28 RX ADMIN — FENTANYL CITRATE 1 PATCH: 50 INJECTION INTRAVENOUS at 14:11

## 2021-11-28 RX ADMIN — SPIRONOLACTONE 25 MILLIGRAM(S): 25 TABLET, FILM COATED ORAL at 05:18

## 2021-11-28 RX ADMIN — Medication 10 MILLIGRAM(S): at 17:03

## 2021-11-28 RX ADMIN — Medication 40 MILLIEQUIVALENT(S): at 09:39

## 2021-11-28 RX ADMIN — HYDROMORPHONE HYDROCHLORIDE 2 MILLIGRAM(S): 2 INJECTION INTRAMUSCULAR; INTRAVENOUS; SUBCUTANEOUS at 17:01

## 2021-11-28 RX ADMIN — FENTANYL CITRATE 1 PATCH: 50 INJECTION INTRAVENOUS at 14:19

## 2021-11-28 NOTE — PROGRESS NOTE ADULT - SUBJECTIVE AND OBJECTIVE BOX
CARDIOLOGY     PROGRESS  NOTE   ________________________________________________    CHIEF COMPLAINT:Patient is a 81y old  Female who presents with a chief complaint of change of mental status (27 Nov 2021 09:50)  no complain.  	  REVIEW OF SYSTEMS:  CONSTITUTIONAL: No fever, weight loss, or fatigue  EYES: No eye pain, visual disturbances, or discharge  ENT:  No difficulty hearing, tinnitus, vertigo; No sinus or throat pain  NECK: No pain or stiffness  RESPIRATORY: No cough, wheezing, chills or hemoptysis; No Shortness of Breath  CARDIOVASCULAR: No chest pain, palpitations, passing out, dizziness, or leg swelling  GASTROINTESTINAL: No abdominal or epigastric pain. No nausea, vomiting, or hematemesis; No diarrhea or constipation. No melena or hematochezia.  GENITOURINARY: No dysuria, frequency, hematuria, or incontinence  NEUROLOGICAL: No headaches, memory loss, loss of strength, numbness, or tremors  SKIN: No itching, burning, rashes, or lesions   LYMPH Nodes: No enlarged glands  ENDOCRINE: No heat or cold intolerance; No hair loss  MUSCULOSKELETAL: No joint pain or swelling; No muscle, back, or extremity pain  PSYCHIATRIC: No depression, anxiety, mood swings, or difficulty sleeping  HEME/LYMPH: No easy bruising, or bleeding gums  ALLERGY AND IMMUNOLOGIC: No hives or eczema	    [ ] All others negative	  [ ] Unable to obtain    PHYSICAL EXAM:  T(C): 36.7 (11-28-21 @ 05:04), Max: 36.9 (11-27-21 @ 21:35)  HR: 65 (11-28-21 @ 05:04) (61 - 68)  BP: 162/91 (11-28-21 @ 05:04) (91/58 - 162/91)  RR: 17 (11-28-21 @ 05:04) (17 - 18)  SpO2: 94% (11-28-21 @ 05:04) (94% - 95%)  Wt(kg): --  I&O's Summary    27 Nov 2021 07:01  -  28 Nov 2021 07:00  --------------------------------------------------------  IN: 540 mL / OUT: 400 mL / NET: 140 mL        Appearance: Normal	  HEENT:   Normal oral mucosa, PERRL, EOMI	  Lymphatic: No lymphadenopathy  Cardiovascular: Normal S1 S2, No JVD, + murmurs, No edema  Respiratory: rhonchi  Psychiatry: A & O x 3, Mood & affect appropriate  Gastrointestinal:  Soft, Non-tender, + BS	  Skin: No rashes, No ecchymoses, No cyanosis	  Neurologic: Non-focal  Extremities: Normal range of motion, No clubbing, cyanosis or edema  Vascular: Peripheral pulses palpable 2+ bilaterally    MEDICATIONS  (STANDING):  aspirin enteric coated 81 milliGRAM(s) Oral daily  baclofen 10 milliGRAM(s) Oral two times a day  DULoxetine 60 milliGRAM(s) Oral daily  fentaNYL   Patch  50 MICROgram(s)/Hr 1 Patch Transdermal every 72 hours  furosemide    Tablet 40 milliGRAM(s) Oral daily  losartan 25 milliGRAM(s) Oral daily  metoprolol succinate ER 25 milliGRAM(s) Oral daily  mirtazapine 15 milliGRAM(s) Oral at bedtime  pantoprazole    Tablet 40 milliGRAM(s) Oral before breakfast  polyethylene glycol 3350 17 Gram(s) Oral two times a day  potassium chloride    Tablet ER 40 milliEquivalent(s) Oral every 4 hours  rivaroxaban 10 milliGRAM(s) Oral daily  spironolactone 25 milliGRAM(s) Oral daily      TELEMETRY: 	    ECG:  	  RADIOLOGY:  OTHER: 	  	  LABS:	 	    CARDIAC MARKERS:                                11.7   7.16  )-----------( 283      ( 27 Nov 2021 06:47 )             37.4     11-28    142  |  101  |  19  ----------------------------<  97  3.2<L>   |  28  |  0.68    Ca    8.7      28 Nov 2021 06:09      proBNP: Serum Pro-Brain Natriuretic Peptide: 8165 pg/mL (11-21 @ 13:51)  Serum Pro-Brain Natriuretic Peptide: 2012 pg/mL (11-18 @ 14:58)  Serum Pro-Brain Natriuretic Peptide: 4144 pg/mL (11-18 @ 11:38)    Lipid Profile:   HgA1c:   TSH: Thyroid Stimulating Hormone, Serum: 1.85 uIU/mL (11-19 @ 08:53)          Assessment and plan  ---------------------------  82 yo f bilateral knee arthritis,  HTN, remote breast ca s/p mastectomy and LN resection, provoked PE 1993, chronic pain, presents with inability to get out of bed and a stroke code was called on her. Pt with daughter states that last known well was 1 am, and states at 3 am, pt woke up and was unable to get out of bed with her walker as she normally does. She did not respond to her family members, was able to follow commands, but denies any slurring speech, or one-sided weakness. Daughter denies any n/v/f/c, cp. palpitations. Stroke code showed no acute findings.  pt apparently had disorientation and lethargy since early thi am , and no responding  to verbal stimuli and being disoriented.  pt daughter claims as she was given Lyrica she has had moments of lethargy.  pt awake and alert now in ER.  change of mental status seen by neuro  r/o seizure disorder check EEG  continue all pain meds, hold Lyrica  check labs  urology  appreciated  echo results noted, will start coreg/ losartan/ severe lv dysfunction r/o cad ? TAKASUBO  ?cath will discuss with pt and family, pt refuses cath, decided on cardiac ct  awaiting EEG/ MRI refusing  cardiac ct  results discussed with Dr Elizalde, appreciated, cardiac cath noted, risk factor modification  still awaiting EEG  aldactone added  ?dc tomorrow

## 2021-11-29 ENCOUNTER — TRANSCRIPTION ENCOUNTER (OUTPATIENT)
Age: 81
End: 2021-11-29

## 2021-11-29 VITALS
RESPIRATION RATE: 16 BRPM | OXYGEN SATURATION: 96 % | SYSTOLIC BLOOD PRESSURE: 105 MMHG | HEART RATE: 62 BPM | TEMPERATURE: 98 F | DIASTOLIC BLOOD PRESSURE: 64 MMHG

## 2021-11-29 LAB
ANION GAP SERPL CALC-SCNC: 13 MMOL/L — SIGNIFICANT CHANGE UP (ref 5–17)
BUN SERPL-MCNC: 18 MG/DL — SIGNIFICANT CHANGE UP (ref 7–23)
CALCIUM SERPL-MCNC: 9 MG/DL — SIGNIFICANT CHANGE UP (ref 8.4–10.5)
CHLORIDE SERPL-SCNC: 101 MMOL/L — SIGNIFICANT CHANGE UP (ref 96–108)
CO2 SERPL-SCNC: 25 MMOL/L — SIGNIFICANT CHANGE UP (ref 22–31)
CREAT SERPL-MCNC: 0.69 MG/DL — SIGNIFICANT CHANGE UP (ref 0.5–1.3)
GLUCOSE SERPL-MCNC: 72 MG/DL — SIGNIFICANT CHANGE UP (ref 70–99)
MAGNESIUM SERPL-MCNC: 2.1 MG/DL — SIGNIFICANT CHANGE UP (ref 1.6–2.6)
POTASSIUM SERPL-MCNC: 3.8 MMOL/L — SIGNIFICANT CHANGE UP (ref 3.5–5.3)
POTASSIUM SERPL-SCNC: 3.8 MMOL/L — SIGNIFICANT CHANGE UP (ref 3.5–5.3)
SARS-COV-2 RNA SPEC QL NAA+PROBE: SIGNIFICANT CHANGE UP
SODIUM SERPL-SCNC: 139 MMOL/L — SIGNIFICANT CHANGE UP (ref 135–145)

## 2021-11-29 PROCEDURE — 70496 CT ANGIOGRAPHY HEAD: CPT | Mod: MA

## 2021-11-29 PROCEDURE — 82947 ASSAY GLUCOSE BLOOD QUANT: CPT

## 2021-11-29 PROCEDURE — 82803 BLOOD GASES ANY COMBINATION: CPT

## 2021-11-29 PROCEDURE — 84155 ASSAY OF PROTEIN SERUM: CPT

## 2021-11-29 PROCEDURE — 97116 GAIT TRAINING THERAPY: CPT

## 2021-11-29 PROCEDURE — 87389 HIV-1 AG W/HIV-1&-2 AB AG IA: CPT

## 2021-11-29 PROCEDURE — 83605 ASSAY OF LACTIC ACID: CPT

## 2021-11-29 PROCEDURE — U0005: CPT

## 2021-11-29 PROCEDURE — 84484 ASSAY OF TROPONIN QUANT: CPT

## 2021-11-29 PROCEDURE — 80048 BASIC METABOLIC PNL TOTAL CA: CPT

## 2021-11-29 PROCEDURE — 82553 CREATINE MB FRACTION: CPT

## 2021-11-29 PROCEDURE — 99285 EMERGENCY DEPT VISIT HI MDM: CPT | Mod: 25

## 2021-11-29 PROCEDURE — 80307 DRUG TEST PRSMV CHEM ANLYZR: CPT

## 2021-11-29 PROCEDURE — 74177 CT ABD & PELVIS W/CONTRAST: CPT | Mod: MA

## 2021-11-29 PROCEDURE — 84165 PROTEIN E-PHORESIS SERUM: CPT

## 2021-11-29 PROCEDURE — 82728 ASSAY OF FERRITIN: CPT

## 2021-11-29 PROCEDURE — 95816 EEG AWAKE AND DROWSY: CPT

## 2021-11-29 PROCEDURE — 82962 GLUCOSE BLOOD TEST: CPT

## 2021-11-29 PROCEDURE — 93454 CORONARY ARTERY ANGIO S&I: CPT

## 2021-11-29 PROCEDURE — 85025 COMPLETE CBC W/AUTO DIFF WBC: CPT

## 2021-11-29 PROCEDURE — 85014 HEMATOCRIT: CPT

## 2021-11-29 PROCEDURE — 75574 CT ANGIO HRT W/3D IMAGE: CPT

## 2021-11-29 PROCEDURE — 84166 PROTEIN E-PHORESIS/URINE/CSF: CPT

## 2021-11-29 PROCEDURE — 84132 ASSAY OF SERUM POTASSIUM: CPT

## 2021-11-29 PROCEDURE — 85018 HEMOGLOBIN: CPT

## 2021-11-29 PROCEDURE — 80053 COMPREHEN METABOLIC PANEL: CPT

## 2021-11-29 PROCEDURE — 99152 MOD SED SAME PHYS/QHP 5/>YRS: CPT

## 2021-11-29 PROCEDURE — 93005 ELECTROCARDIOGRAM TRACING: CPT

## 2021-11-29 PROCEDURE — 97110 THERAPEUTIC EXERCISES: CPT

## 2021-11-29 PROCEDURE — 96375 TX/PRO/DX INJ NEW DRUG ADDON: CPT

## 2021-11-29 PROCEDURE — 36415 COLL VENOUS BLD VENIPUNCTURE: CPT

## 2021-11-29 PROCEDURE — 85730 THROMBOPLASTIN TIME PARTIAL: CPT

## 2021-11-29 PROCEDURE — 82550 ASSAY OF CK (CPK): CPT

## 2021-11-29 PROCEDURE — C1769: CPT

## 2021-11-29 PROCEDURE — C8929: CPT

## 2021-11-29 PROCEDURE — C1894: CPT

## 2021-11-29 PROCEDURE — 86325 OTHER IMMUNOELECTROPHORESIS: CPT

## 2021-11-29 PROCEDURE — 70450 CT HEAD/BRAIN W/O DYE: CPT | Mod: MA

## 2021-11-29 PROCEDURE — 84295 ASSAY OF SERUM SODIUM: CPT

## 2021-11-29 PROCEDURE — 81001 URINALYSIS AUTO W/SCOPE: CPT

## 2021-11-29 PROCEDURE — 87086 URINE CULTURE/COLONY COUNT: CPT

## 2021-11-29 PROCEDURE — 83880 ASSAY OF NATRIURETIC PEPTIDE: CPT

## 2021-11-29 PROCEDURE — 96374 THER/PROPH/DIAG INJ IV PUSH: CPT

## 2021-11-29 PROCEDURE — 97162 PT EVAL MOD COMPLEX 30 MIN: CPT

## 2021-11-29 PROCEDURE — 84443 ASSAY THYROID STIM HORMONE: CPT

## 2021-11-29 PROCEDURE — 0042T: CPT

## 2021-11-29 PROCEDURE — 85027 COMPLETE CBC AUTOMATED: CPT

## 2021-11-29 PROCEDURE — 71045 X-RAY EXAM CHEST 1 VIEW: CPT

## 2021-11-29 PROCEDURE — 97161 PT EVAL LOW COMPLEX 20 MIN: CPT

## 2021-11-29 PROCEDURE — 70498 CT ANGIOGRAPHY NECK: CPT | Mod: MA

## 2021-11-29 PROCEDURE — 82330 ASSAY OF CALCIUM: CPT

## 2021-11-29 PROCEDURE — U0003: CPT

## 2021-11-29 PROCEDURE — 86769 SARS-COV-2 COVID-19 ANTIBODY: CPT

## 2021-11-29 PROCEDURE — 85610 PROTHROMBIN TIME: CPT

## 2021-11-29 PROCEDURE — 86334 IMMUNOFIX E-PHORESIS SERUM: CPT

## 2021-11-29 PROCEDURE — 82607 VITAMIN B-12: CPT

## 2021-11-29 PROCEDURE — 84156 ASSAY OF PROTEIN URINE: CPT

## 2021-11-29 PROCEDURE — 82435 ASSAY OF BLOOD CHLORIDE: CPT

## 2021-11-29 PROCEDURE — 83735 ASSAY OF MAGNESIUM: CPT

## 2021-11-29 RX ORDER — BACLOFEN 100 %
1 POWDER (GRAM) MISCELLANEOUS
Qty: 0 | Refills: 0 | DISCHARGE
Start: 2021-11-29

## 2021-11-29 RX ORDER — DULOXETINE HYDROCHLORIDE 30 MG/1
60 CAPSULE, DELAYED RELEASE ORAL
Qty: 0 | Refills: 0 | DISCHARGE

## 2021-11-29 RX ORDER — OMEPRAZOLE 10 MG/1
20 CAPSULE, DELAYED RELEASE ORAL
Qty: 0 | Refills: 0 | DISCHARGE

## 2021-11-29 RX ORDER — CEVIMELINE 30 MG/1
30 CAPSULE ORAL
Qty: 0 | Refills: 0 | DISCHARGE

## 2021-11-29 RX ORDER — PANTOPRAZOLE SODIUM 20 MG/1
1 TABLET, DELAYED RELEASE ORAL
Qty: 0 | Refills: 0 | DISCHARGE
Start: 2021-11-29

## 2021-11-29 RX ORDER — FUROSEMIDE 40 MG
1 TABLET ORAL
Qty: 0 | Refills: 0 | DISCHARGE
Start: 2021-11-29

## 2021-11-29 RX ORDER — OXYCODONE HYDROCHLORIDE 5 MG/1
1 TABLET ORAL
Qty: 0 | Refills: 0 | DISCHARGE

## 2021-11-29 RX ORDER — SPIRONOLACTONE 25 MG/1
1 TABLET, FILM COATED ORAL
Qty: 0 | Refills: 0 | DISCHARGE
Start: 2021-11-29

## 2021-11-29 RX ORDER — FUROSEMIDE 40 MG
40 TABLET ORAL
Qty: 0 | Refills: 0 | DISCHARGE

## 2021-11-29 RX ORDER — TRAZODONE HCL 50 MG
50 TABLET ORAL
Qty: 0 | Refills: 0 | DISCHARGE

## 2021-11-29 RX ORDER — LANOLIN ALCOHOL/MO/W.PET/CERES
1 CREAM (GRAM) TOPICAL
Qty: 0 | Refills: 0 | DISCHARGE
Start: 2021-11-29

## 2021-11-29 RX ORDER — ASPIRIN/CALCIUM CARB/MAGNESIUM 324 MG
1 TABLET ORAL
Qty: 0 | Refills: 0 | DISCHARGE

## 2021-11-29 RX ORDER — BACLOFEN 100 %
10 POWDER (GRAM) MISCELLANEOUS
Qty: 0 | Refills: 0 | DISCHARGE

## 2021-11-29 RX ORDER — POLYETHYLENE GLYCOL 3350 17 G/17G
17 POWDER, FOR SOLUTION ORAL
Qty: 0 | Refills: 0 | DISCHARGE
Start: 2021-11-29

## 2021-11-29 RX ORDER — DULOXETINE HYDROCHLORIDE 30 MG/1
1 CAPSULE, DELAYED RELEASE ORAL
Qty: 0 | Refills: 0 | DISCHARGE
Start: 2021-11-29

## 2021-11-29 RX ORDER — LOSARTAN POTASSIUM 100 MG/1
1 TABLET, FILM COATED ORAL
Qty: 0 | Refills: 0 | DISCHARGE
Start: 2021-11-29

## 2021-11-29 RX ORDER — ASPIRIN/CALCIUM CARB/MAGNESIUM 324 MG
1 TABLET ORAL
Qty: 0 | Refills: 0 | DISCHARGE
Start: 2021-11-29

## 2021-11-29 RX ORDER — LUBIPROSTONE 24 UG/1
24 CAPSULE, GELATIN COATED ORAL
Qty: 0 | Refills: 0 | DISCHARGE

## 2021-11-29 RX ORDER — METOPROLOL TARTRATE 50 MG
1 TABLET ORAL
Qty: 0 | Refills: 0 | DISCHARGE
Start: 2021-11-29

## 2021-11-29 RX ORDER — MIRTAZAPINE 45 MG/1
1 TABLET, ORALLY DISINTEGRATING ORAL
Qty: 0 | Refills: 0 | DISCHARGE
Start: 2021-11-29

## 2021-11-29 RX ADMIN — HYDROMORPHONE HYDROCHLORIDE 2 MILLIGRAM(S): 2 INJECTION INTRAMUSCULAR; INTRAVENOUS; SUBCUTANEOUS at 17:16

## 2021-11-29 RX ADMIN — Medication 10 MILLIGRAM(S): at 17:17

## 2021-11-29 RX ADMIN — POLYETHYLENE GLYCOL 3350 17 GRAM(S): 17 POWDER, FOR SOLUTION ORAL at 17:18

## 2021-11-29 RX ADMIN — RIVAROXABAN 10 MILLIGRAM(S): KIT at 17:17

## 2021-11-29 RX ADMIN — DULOXETINE HYDROCHLORIDE 60 MILLIGRAM(S): 30 CAPSULE, DELAYED RELEASE ORAL at 09:17

## 2021-11-29 RX ADMIN — SPIRONOLACTONE 25 MILLIGRAM(S): 25 TABLET, FILM COATED ORAL at 05:21

## 2021-11-29 RX ADMIN — Medication 25 MILLIGRAM(S): at 05:21

## 2021-11-29 RX ADMIN — LOSARTAN POTASSIUM 25 MILLIGRAM(S): 100 TABLET, FILM COATED ORAL at 05:21

## 2021-11-29 RX ADMIN — POLYETHYLENE GLYCOL 3350 17 GRAM(S): 17 POWDER, FOR SOLUTION ORAL at 09:16

## 2021-11-29 RX ADMIN — Medication 40 MILLIGRAM(S): at 05:21

## 2021-11-29 RX ADMIN — Medication 81 MILLIGRAM(S): at 09:16

## 2021-11-29 RX ADMIN — Medication 10 MILLIGRAM(S): at 05:22

## 2021-11-29 RX ADMIN — FENTANYL CITRATE 1 PATCH: 50 INJECTION INTRAVENOUS at 07:13

## 2021-11-29 NOTE — DISCHARGE NOTE PROVIDER - HOSPITAL COURSE
82 yo f bilateral knee arthritis,  HTN, remote breast ca s/p mastectomy and LN resection, provoked PE 1993, chronic pain, presents with inability to get out of bed and a stroke code was called on her. Pt with daughter states that last known well was 1 am, and states at 3 am, pt woke up and was unable to get out of bed with her walker as she normally does. She did not respond to her family members, was able to follow commands, but denies any slurring speech, or one-sided weakness. Daughter denies any n/v/f/c, cp. palpitations. Stroke code showed no acute findings.  pt apparently had disorientation and lethargy since early thi am , and no responding  to verbal stimuli and being disoriented.  pt daughter claims as she was given Lyrica she has had moments of lethargy.  patient awake and alert now in ER change of mental status seen by neurology   r/o seizure disorder check EEG  continue all pain meds, hold Lyrica  urology  appreciated  echo results noted, will start coreg/ losartan/ severe lv dysfunction r/o cad ? TAKASUBO  ?cath will discuss with pt and family, pt refuses cath, decided on cardiac ct EEG with abnormal finding   MRI refusing  cardiac ct  results discussed with Dr Elizalde, appreciated, cardiac cath noted, risk factor modification  still awaiting EEG  aldactone added, fu k level  ?dc to rehab     82 yo f bilateral knee arthritis,  HTN, remote breast ca s/p mastectomy and LN resection, provoked PE 1993, chronic pain, presents with inability to get out of bed and a stroke code was called on her. Pt with daughter states that last known well was 1 am, and states at 3 am, pt woke up and was unable to get out of bed with her walker as she normally does. She did not respond to her family members, was able to follow commands, but denies any slurring speech, or one-sided weakness. Daughter denies any n/v/f/c, cp. palpitations. Stroke code showed no acute findings.  pt apparently had disorientation and lethargy since early thi am , and no responding  to verbal stimuli and being disoriented.  pt daughter claims as she was given Lyrica she has had moments of lethargy.  patient awake and alert now in ER change of mental status seen by neurology   r/o seizure disorder check EEG  continue all pain meds, hold Lyrica  urology  appreciated  echo results noted, will start coreg/ losartan/ severe lv dysfunction r/o cad ? TAKASUBO  ?cath will discuss with pt and family, pt refuses cath, decided on cardiac ct EEG with abnormal finding   MRI refusing  cardiac ct  results discussed with Dr Elizalde, appreciated, cardiac cath noted, risk factor modification   EEG with generalized slowing-non specific and no seizures  Likely toxic metabolic encephalopathy  PT improving clinically.  No need for further neurologic work up   aldactone added, fu k level  Patient medically cleared for discharge

## 2021-11-29 NOTE — PROGRESS NOTE ADULT - REASON FOR ADMISSION
change of mental status

## 2021-11-29 NOTE — CHART NOTE - NSCHARTNOTEFT_GEN_A_CORE
Asked to evaluate for feeling of lightheadedness and cold sweats. pt seen lying in bed. denies chest pain, SOB, palpitations      Vital Signs Last 24 Hrs  T(C): 36.7 (21 Nov 2021 11:27), Max: 36.9 (21 Nov 2021 06:40)  T(F): 98.1 (21 Nov 2021 11:27), Max: 98.4 (21 Nov 2021 06:40)  HR: 70 (21 Nov 2021 11:27) (70 - 91)  BP: 122/79 (21 Nov 2021 11:27) (117/79 - 131/84)  BP(mean): --  RR: 18 (21 Nov 2021 11:27) (18 - 18)  SpO2: 97% (21 Nov 2021 11:27) (95% - 97%)    Physical Exam:  General: WN/WD NAD  Neurology: A&Ox3, nonfocal, OLIVA x 4  Head:  Normocephalic, atraumatic  Respiratory: CTA B/L  CV: RRR, S1S2, no murmur  Abdominal: Soft, NT, ND no palpable mass  MSK: No edema, + peripheral pulses, FROM all 4 extremity  Labs:                          11.9   8.13  )-----------( 227      ( 20 Nov 2021 06:43 )             36.9     11-20    140  |  104  |  19  ----------------------------<  95  3.6   |  26  |  0.76    Ca    8.6      20 Nov 2021 06:43            No Known Allergies    Intolerances    	    REVIEW OF SYSTEMS:  CONSTITUTIONAL: No fever, weight loss, or fatigue  EYES: No eye pain, visual disturbances, or discharge  ENT:  No difficulty hearing, tinnitus, vertigo; No sinus or throat pain  NECK: No pain or stiffness  RESPIRATORY: No cough, wheezing, chills or hemoptysis; No Shortness of Breath  CARDIOVASCULAR: No chest pain, palpitations, passing out, dizziness, or leg swelling  GASTROINTESTINAL: No abdominal or epigastric pain. No nausea, vomiting, or hematemesis; No diarrhea or constipation. No melena or hematochezia.  GENITOURINARY: No dysuria, frequency, hematuria, or incontinence  NEUROLOGICAL: No headaches, memory loss, loss of strength, numbness, or tremors  SKIN: No itching, burning, rashes, or lesions   LYMPH Nodes: No enlarged glands  ENDOCRINE: No heat or cold intolerance; No hair loss  MUSCULOSKELETAL: No joint pain or swelling; No muscle, back, or extremity pain  PSYCHIATRIC: No depression, anxiety, mood swings, or difficulty sleeping  HEME/LYMPH: No easy bruising, or bleeding gums  ALLERGY AND IMMUNOLOGIC: No hives or eczema	    [x ] All others negative	  [ ] Unable to obtain    PHYSICAL EXAM:  T(C): 36.7 (11-18-21 @ 11:24), Max: 36.7 (11-18-21 @ 11:24)  HR: 70 (11-18-21 @ 17:57) (67 - 71)  BP: 114/70 (11-18-21 @ 17:57) (113/66 - 170/90)  RR: 18 (11-18-21 @ 17:57) (15 - 18)  SpO2: 93% (11-18-21 @ 17:57) (84% - 98%)  Wt(kg): --  I&O's Summary      Appearance: Normal	  HEENT:   Normal oral mucosa, PERRL, EOMI	  Lymphatic: No lymphadenopathy  Cardiovascular: Normal S1 S2, No JVD, + murmurs, No edema  Respiratory: Lungs clear to auscultation	  Psychiatry: A & O x 3, Mood & affect appropriate  Gastrointestinal:  Soft, Non-tender, + BS	  Skin: No rashes, No ecchymoses, No cyanosis	  Neurologic: Non-focal  Extremities: Normal range of motion, No clubbing, cyanosis or edema  Vascular: Peripheral pulses palpable 2+ bilaterally    TELEMETRY: 	    ECG:  	  RADIOLOGY:  OTHER: 	  	  LABS:	 	    CARDIAC MARKERS:  CARDIAC MARKERS ( 18 Nov 2021 14:58 )  x     / x     / 62 U/L / x     / 3.7 ng/mL  CARDIAC MARKERS ( 18 Nov 2021 11:38 )  x     / x     / 90 U/L / x     / 6.8 ng/mL                              12.3   8.24  )-----------( 219      ( 18 Nov 2021 11:38 )             39.1     11-18    141  |  104  |  21  ----------------------------<  94  3.9   |  26  |  1.22    Ca    9.0      18 Nov 2021 11:38    TPro  6.4  /  Alb  3.4  /  TBili  0.5  /  DBili  x   /  AST  21  /  ALT  8<L>  /  AlkPhos  71  11-18    proBNP: Serum Pro-Brain Natriuretic Peptide: 2012 pg/mL (11-18 @ 14:58)  Serum Pro-Brain Natriuretic Peptide: 4144 pg/mL (11-18 @ 11:38        Assessment & Plan:  80 yo f bilateral knee arthritis,  HTN, remote breast ca s/p mastectomy and LN resection, provoked PE 1993, chronic pain, presents with inability to get out of bed and a stroke code was called on her. CT head and CTA  brain neg. pending MRI and EEG. continued all pain meds, except lyrica; ECho with severe LV systolic dysfunction and WMA. Now with episode of lightheadedness and cold sweats.     > 12 lead EKG ( q waves v1/v2 ; no st/t changes) present in v1 in previous EKG  > cardiac enzymes stat  > transfer to telemetry to r/o arrythmia  >Discussed with cardiology Attending    Carlyn Yates ANP-BC  #[id number]
Patient is a 81y old  Female who presents with a chief complaint of change of mental status (29 Nov 2021 13:03)      Vital Signs Last 24 Hrs  T(C): 36.4 (29 Nov 2021 17:30), Max: 36.8 (28 Nov 2021 20:54)  T(F): 97.6 (29 Nov 2021 17:30), Max: 98.2 (28 Nov 2021 20:54)  HR: 62 (29 Nov 2021 17:30) (58 - 62)  BP: 105/64 (29 Nov 2021 17:30) (96/62 - 126/72)  BP(mean): --  RR: 16 (29 Nov 2021 17:30) (16 - 18)  SpO2: 96% (29 Nov 2021 17:30) (93% - 96%)      Labs:      11-29    139  |  101  |  18  ----------------------------<  72  3.8   |  25  |  0.69    Ca    9.0      29 Nov 2021 07:20  Mg     2.1     11-29        Radiology:    Physical Exam:  General: WN/WD NAD  Neurology: A&Ox3, nonfocal, OLIVA x 4  Head:  Normocephalic, atraumatic  Respiratory: CTA B/L  CV: RRR, S1S2, no murmur  Abdominal: Soft, NT, ND no palpable mass  MSK: No edema, + peripheral pulses, FROM all 4 extremity    Discharge Note and Plan: patient Cleared my Neurology for discharge and cleared by Dr Jaramillo   >Follow up with outpatient   See discharge   >  >  >
SHIRLEY ROWLAND    Notified by RN patient with  sinus bradycardia HR 48's -50's while awake   Patient asymptomatic   Vital Signs Last 24 Hrs  T(C): 36.6 (22 Nov 2021 16:00), Max: 36.8 (22 Nov 2021 04:32)  T(F): 97.9 (22 Nov 2021 16:00), Max: 98.3 (22 Nov 2021 04:32)  HR: 64 (22 Nov 2021 16:00) (62 - 75)  BP: 115/71 (22 Nov 2021 16:00) (104/70 - 115/71)  BP(mean): --  RR: 18 (22 Nov 2021 16:00) (18 - 18)  SpO2: 95% (22 Nov 2021 16:00) (95% - 95%)  HPI:  CHIEF COMPLAINT:Patient is a 81y old  Female who presents with a chief complaint of     HPI:  82 yo f bilateral knee arthritis,  HTN, remote breast ca s/p mastectomy and LN resection, provoked PE 1993, chronic pain, presents with inability to get out of bed and a stroke code was called on her. Pt with daughter states that last known well was 1 am, and states at 3 am, pt woke up and was unable to get out of bed with her walker as she normally does. She did not respond to her family members, was able to follow commands, but denies any slurring speech, or one-sided weakness. Daughter denies any n/v/f/c, cp. palpitations. Stroke code showed no acute findings.  pt apparently had disorientation and lethargy since early thi am , and no tresponding to verbal stimuli and being disoriented.  pt daughter claims as she was given lyrica, she has had moments of lethargy.  pt awake and alert now in ER.          # Bradycardia   monitor on Tele   Monitor vital signs   Discussed with Dr Jaramillo awaiting
EEG with generalized slowing-non specific and no seizures  Likely toxic metabolic encephalopathy  PT improving clinically.  No need for further neurologic work up and PT [facility] if [facility].

## 2021-11-29 NOTE — DISCHARGE NOTE NURSING/CASE MANAGEMENT/SOCIAL WORK - PATIENT PORTAL LINK FT
You can access the FollowMyHealth Patient Portal offered by [facility] by registering at the following website: [email or web address]. By joining [facility]’s FollowMyHealth portal, you will also be able to view your health information using other applications (apps) compatible with our system.

## 2021-11-29 NOTE — DISCHARGE NOTE PROVIDER - CARE PROVIDER_API CALL
yNdia Jaramillo  CARDIOVASCULAR DISEASE  [address], [address]  [place] [address]  Phone: [phone]  Fax: [phone]  Follow Up Time: 1 week

## 2021-11-29 NOTE — DISCHARGE NOTE PROVIDER - NSDCMRMEDTOKEN_GEN_ALL_CORE_FT
acetaminophen 325 mg oral tablet: 2 tab(s) orally every 6 hours, As needed, Temp greater or equal to 38C (100.4F), Mild Pain (1 - 3)  Amitiza: 24 microgram(s) orally 2 times a day  aspirin 81 mg oral delayed release capsule: 1 tab(s) orally once a day  baclofen: 10  orally once a day  Cartia XT: 180 milligram(s) orally once a day  cevimeline: 30 milligram(s) orally 3 times a day  docusate sodium 100 mg oral capsule: 1 cap(s) orally 3 times a day  DULoxetine: 60 milligram(s) orally once a day  fentaNYL 50 mcg/hr transdermal film, extended release: 1 patch transdermal every 72 hours  furosemide: 40 milligram(s) orally once a day  HYDROmorphone 2 mg oral tablet: 1 tab(s) orally every 4 hours, As needed, Moderate Pain (4 - 6)  omeprazole: 20 milligram(s) orally once a day  oxyCODONE 10 mg oral tablet: 1 tab(s) orally 5 times a day, As Needed  polyethylene glycol 3350 oral powder for reconstitution: 17 gram(s) orally once a day (at bedtime)  rivaroxaban 10 mg oral tablet: 1 tab(s) orally once a day  traMADol 50 mg oral tablet: 1 tab(s) orally every 6 hours, As needed, Mild Pain (1 - 3)  traZODone: 50 milligram(s) orally once a day (at bedtime)   acetaminophen 325 mg oral tablet: 2 tab(s) orally every 6 hours, As needed, Temp greater or equal to 38C (100.4F), Mild Pain (1 - 3)  aspirin 81 mg oral delayed release tablet: 1 tab(s) orally once a day  baclofen 10 mg oral tablet: 1 tab(s) orally 2 times a day  DULoxetine 60 mg oral delayed release capsule: 1 cap(s) orally once a day  fentaNYL 50 mcg/hr transdermal film, extended release: 1 patch transdermal every 72 hours  furosemide 40 mg oral tablet: 1 tab(s) orally once a day  HYDROmorphone 2 mg oral tablet: 1 tab(s) orally every 4 hours, As needed, Moderate Pain (4 - 6)  losartan 25 mg oral tablet: 1 tab(s) orally once a day  melatonin 3 mg oral tablet: 1 tab(s) orally once a day (at bedtime), As needed, Insomnia  metoprolol succinate 25 mg oral tablet, extended release: 1 tab(s) orally once a day  mirtazapine 15 mg oral tablet: 1 tab(s) orally once a day (at bedtime)  pantoprazole 40 mg oral delayed release tablet: 1 tab(s) orally once a day (before a meal)  polyethylene glycol 3350 oral powder for reconstitution: 17 gram(s) orally 2 times a day  rivaroxaban 10 mg oral tablet: 1 tab(s) orally once a day  spironolactone 25 mg oral tablet: 1 tab(s) orally once a day

## 2021-11-29 NOTE — DISCHARGE NOTE PROVIDER - NSDCFUADDAPPT_GEN_ALL_CORE_FT
Please follow up with outpatient cardiology   Please follow up with PMD in 1-2 weeks after discharge from Rehab  Please follow up with outpatient cardiology   Please follow up with PMD in 1-2 weeks after discharge from Rehab    follow up as needed with general neurology at [address] if symptoms persist.

## 2021-11-29 NOTE — DISCHARGE NOTE NURSING/CASE MANAGEMENT/SOCIAL WORK - NSDCFUADDAPPT_GEN_ALL_CORE_FT
Please follow up with outpatient cardiology   Please follow up with PMD in 1-2 weeks after discharge from Rehab    follow up as needed with general neurology at [address] if symptoms persist.

## 2021-11-29 NOTE — PROGRESS NOTE ADULT - SUBJECTIVE AND OBJECTIVE BOX
CARDIOLOGY     PROGRESS  NOTE   ________________________________________________    CHIEF COMPLAINT:Patient is a 81y old  Female who presents with a chief complaint of change of mental status (28 Nov 2021 09:30)  no complain.  	  REVIEW OF SYSTEMS:  CONSTITUTIONAL: No fever, weight loss, or fatigue  EYES: No eye pain, visual disturbances, or discharge  ENT:  No difficulty hearing, tinnitus, vertigo; No sinus or throat pain  NECK: No pain or stiffness  RESPIRATORY: No cough, wheezing, chills or hemoptysis; No Shortness of Breath  CARDIOVASCULAR: No chest pain, palpitations, passing out, dizziness, or leg swelling  GASTROINTESTINAL: No abdominal or epigastric pain. No nausea, vomiting, or hematemesis; No diarrhea or constipation. No melena or hematochezia.  GENITOURINARY: No dysuria, frequency, hematuria, or incontinence  NEUROLOGICAL: No headaches, memory loss, loss of strength, numbness, or tremors  SKIN: No itching, burning, rashes, or lesions   LYMPH Nodes: No enlarged glands  ENDOCRINE: No heat or cold intolerance; No hair loss  MUSCULOSKELETAL: No joint pain or swelling; No muscle, back, or extremity pain  PSYCHIATRIC: No depression, anxiety, mood swings, or difficulty sleeping  HEME/LYMPH: No easy bruising, or bleeding gums  ALLERGY AND IMMUNOLOGIC: No hives or eczema	    [ ] All others negative	  [ ] Unable to obtain    PHYSICAL EXAM:  T(C): 36.7 (11-29-21 @ 04:37), Max: 36.8 (11-28-21 @ 20:54)  HR: 60 (11-29-21 @ 05:20) (59 - 60)  BP: 104/66 (11-29-21 @ 05:20) (104/66 - 126/72)  RR: 18 (11-29-21 @ 05:20) (18 - 18)  SpO2: 96% (11-29-21 @ 05:20) (94% - 96%)  Wt(kg): --  I&O's Summary    28 Nov 2021 07:01  -  29 Nov 2021 07:00  --------------------------------------------------------  IN: 440 mL / OUT: 1560 mL / NET: -1120 mL        Appearance: Normal	  HEENT:   Normal oral mucosa, PERRL, EOMI	  Lymphatic: No lymphadenopathy  Cardiovascular: Normal S1 S2, No JVD, + murmurs, No edema  Respiratory: Lungs clear to auscultation	  Psychiatry: A & O x 3, Mood & affect appropriate  Gastrointestinal:  Soft, Non-tender, + BS	  Skin: No rashes, No ecchymoses, No cyanosis	  Neurologic: Non-focal  Extremities: Normal range of motion, No clubbing, cyanosis or edema  Vascular: Peripheral pulses palpable 2+ bilaterally    MEDICATIONS  (STANDING):  aspirin enteric coated 81 milliGRAM(s) Oral daily  baclofen 10 milliGRAM(s) Oral two times a day  DULoxetine 60 milliGRAM(s) Oral daily  fentaNYL   Patch  50 MICROgram(s)/Hr 1 Patch Transdermal every 72 hours  furosemide    Tablet 40 milliGRAM(s) Oral daily  losartan 25 milliGRAM(s) Oral daily  metoprolol succinate ER 25 milliGRAM(s) Oral daily  mirtazapine 15 milliGRAM(s) Oral at bedtime  pantoprazole    Tablet 40 milliGRAM(s) Oral before breakfast  polyethylene glycol 3350 17 Gram(s) Oral two times a day  rivaroxaban 10 milliGRAM(s) Oral daily  spironolactone 25 milliGRAM(s) Oral daily      TELEMETRY: 	    ECG:  	  RADIOLOGY:  OTHER: 	  	  LABS:	 	    CARDIAC MARKERS:            11-29    139  |  101  |  18  ----------------------------<  72  3.8   |  25  |  0.69    Ca    9.0      29 Nov 2021 07:20  Mg     2.1     11-29      proBNP: Serum Pro-Brain Natriuretic Peptide: 8165 pg/mL (11-21 @ 13:51)  Serum Pro-Brain Natriuretic Peptide: 2012 pg/mL (11-18 @ 14:58)  Serum Pro-Brain Natriuretic Peptide: 4144 pg/mL (11-18 @ 11:38)    Lipid Profile:   HgA1c:   TSH: Thyroid Stimulating Hormone, Serum: 1.85 uIU/mL (11-19 @ 08:53)          Assessment and plan  ---------------------------  80 yo f bilateral knee arthritis,  HTN, remote breast ca s/p mastectomy and LN resection, provoked PE 1993, chronic pain, presents with inability to get out of bed and a stroke code was called on her. Pt with daughter states that last known well was 1 am, and states at 3 am, pt woke up and was unable to get out of bed with her walker as she normally does. She did not respond to her family members, was able to follow commands, but denies any slurring speech, or one-sided weakness. Daughter denies any n/v/f/c, cp. palpitations. Stroke code showed no acute findings.  pt apparently had disorientation and lethargy since early thi am , and no responding  to verbal stimuli and being disoriented.  pt daughter claims as she was given Lyrica she has had moments of lethargy.  pt awake and alert now in ER.  change of mental status seen by neuro  r/o seizure disorder check EEG  continue all pain meds, hold Lyrica  check labs  urology  appreciated  echo results noted, will start coreg/ losartan/ severe lv dysfunction r/o cad ? TAKASUBO  ?cath will discuss with pt and family, pt refuses cath, decided on cardiac ct  awaiting EEG/ MRI refusing  cardiac ct  results discussed with Dr Elizalde, appreciated, cardiac cath noted, risk factor modification  still awaiting EEG  aldactone added, fu k level  ?dc to rehab

## 2021-11-29 NOTE — DISCHARGE NOTE PROVIDER - NSDCCPCAREPLAN_GEN_ALL_CORE_FT
PRINCIPAL DISCHARGE DIAGNOSIS  Diagnosis: Lethargy  Assessment and Plan of Treatment:       SECONDARY DISCHARGE DIAGNOSES  Diagnosis: HTN (hypertension)  Assessment and Plan of Treatment: Low salt diet  Activity as tolerated.  Take all medication as prescribed.  Follow up with your medical doctor for routine blood pressure monitoring at your next visit.  Notify your doctor if you have any of the following symptoms:   Dizziness, Lightheadedness, Blurry vision, Headache, Chest pain, Shortness of breath      Diagnosis: Delirium due to another medical condition  Assessment and Plan of Treatment: continue with current medications

## 2021-11-29 NOTE — PROGRESS NOTE ADULT - NUTRITIONAL ASSESSMENT
This patient has been assessed with a concern for Malnutrition and has been determined to have a diagnosis/diagnoses of Severe protein-calorie malnutrition.    This patient is being managed with:   Diet Regular-  Panchito(7 Gm Arginine/7 Gm Glut/1.2 Gm HMB     Qty per Day:  2  Entered: Nov 21 2021 11:59AM    
This patient has been assessed with a concern for Malnutrition and has been determined to have a diagnosis/diagnoses of Severe protein-calorie malnutrition.    This patient is being managed with:   Diet Regular-  Panchito(7 Gm Arginine/7 Gm Glut/1.2 Gm HMB     Qty per Day:  2  Entered: Nov 21 2021 11:59AM    Diet Regular-  Entered: Nov 18 2021  6:49PM    The following pending diet order is being considered for treatment of Severe protein-calorie malnutrition:null
This patient has been assessed with a concern for Malnutrition and has been determined to have a diagnosis/diagnoses of Severe protein-calorie malnutrition.    This patient is being managed with:   Diet Regular-  Panchito(7 Gm Arginine/7 Gm Glut/1.2 Gm HMB     Qty per Day:  2  Entered: Nov 21 2021 11:59AM    
This patient has been assessed with a concern for Malnutrition and has been determined to have a diagnosis/diagnoses of Severe protein-calorie malnutrition.    This patient is being managed with:   Diet Regular-  Panchito(7 Gm Arginine/7 Gm Glut/1.2 Gm HMB     Qty per Day:  2  Entered: Nov 21 2021 11:59AM

## 2021-11-29 NOTE — PROGRESS NOTE ADULT - PROVIDER SPECIALTY LIST ADULT
Cardiology

## 2021-11-29 NOTE — DISCHARGE NOTE PROVIDER - DETAILS OF MALNUTRITION DIAGNOSIS/DIAGNOSES
This patient has been assessed with a concern for Malnutrition and was treated during this hospitalization for the following Nutrition diagnosis/diagnoses:     -  11/21/2021: Severe protein-calorie malnutrition

## 2021-12-01 LAB
% ALBUMIN: 49.8 % — SIGNIFICANT CHANGE UP
% ALPHA 1: 6.1 % — SIGNIFICANT CHANGE UP
% ALPHA 2: 14.6 % — SIGNIFICANT CHANGE UP
% BETA: 16.4 % — SIGNIFICANT CHANGE UP
% GAMMA: 13.1 % — SIGNIFICANT CHANGE UP
% M SPIKE: SIGNIFICANT CHANGE UP
ALBUMIN SERPL ELPH-MCNC: 3.4 G/DL — LOW (ref 3.6–5.5)
ALBUMIN/GLOB SERPL ELPH: 1 RATIO — SIGNIFICANT CHANGE UP
ALPHA1 GLOB SERPL ELPH-MCNC: 0.4 G/DL — SIGNIFICANT CHANGE UP (ref 0.1–0.4)
ALPHA2 GLOB SERPL ELPH-MCNC: 1 G/DL — SIGNIFICANT CHANGE UP (ref 0.5–1)
B-GLOBULIN SERPL ELPH-MCNC: 1.1 G/DL — HIGH (ref 0.5–1)
GAMMA GLOBULIN: 0.9 G/DL — SIGNIFICANT CHANGE UP (ref 0.6–1.6)
INTERPRETATION SERPL IFE-IMP: SIGNIFICANT CHANGE UP
M-SPIKE: SIGNIFICANT CHANGE UP (ref 0–0)
PROT PATTERN SERPL ELPH-IMP: SIGNIFICANT CHANGE UP
PROT PATTERN SERPL ELPH-IMP: SIGNIFICANT CHANGE UP

## 2022-02-08 NOTE — PATIENT PROFILE ADULT. - NUTRITION PROFILE
58y Female with history of ESRD on HD presents with vomiting and diarrhea found to be COVID-19 positive. Nephrology consulted for ESRD status.    1) ESRD: Last HD on 2/7 tolerated well with 2L removed. Plan for next maintenance HD on 2/9. Will give lokelma 10 grams PO X 1 dose today for hyperkalemia (with partial hemolysis). Monitor electrolytes.    2) HTN with ESRD: BP acceptable. Continue with current medications. Monitor BP.    3) Anemia of renal disease: Hb low with elevated ferritin. Increase Epo to 14K with HD. Monitor Hb.    4) Secondary HPT of renal origin: Phosphorus acceptable with low iPTH. Sensipar decreased to 60 mg PO daily. Continue with renvela 1 tab with meals (goal < 4.5 given concerns for calciphylaxis). Monitor serum calcium and phosphorus.    5) Ab wound: Appreciate dermatology follow up. Ddx includes calciphylaxis versus pyoderma gangrenosum. S/P biopsy. Continue with empiric sodium thiosulfate with HD. No renal objection to treat with CSA as needed by Derm as benefits outweigh risks to residual renal function.      [facility] NEPHROLOGY  Keaton Lopez M.D.  Juma Green D.O.  Myla Hoffmann M.D.  Julia Brewer, MSN, ANP-C    Telephone: [phone]  Facsimile: [phone]    [address] [place] [address] no indicators present

## 2022-02-20 NOTE — H&P ADULT - HISTORY OF PRESENT ILLNESS
78yFemale c/o R knee pain s/p mechanical fall in the bathroom. Patient denies head hit or LOC. Patient denies numbness or tingling in the RLE. Patient denies any other injuries. States she is minimally ambulatory around the house with a walker. When leaving the house, she uses a wheelchair. Patient also states she has two ulcers on her buttocks 2/2 remaining in seated positions for extended periods of time.    PMH:  Breast CA  Scoliosis  HTN (hypertension)    PSH:  H/O shoulder surgery  Cataract  H/O: hysterectomy  H/O mastectomy, right  History of hip replacement  H/O  section    AH:    Meds: See med rec    T(C): --  HR: 86 (18 @ 07:02)  BP: 150/80 (18 @ 07:02)  RR: 19 (18 @ 07:02)  SpO2: 96% (18 @ 07:02)  Wt(kg): --    PE RLE:  Skin intact, no ecchymosis, SILT L2-S1, +EHL/FHL/TA/Gastroc, +ankle ROM, knee and hip ROM limited 2/2 pain, DP+, soft compartments, no calf ttp, +log roll.    Secondary:  No TTP over bony landmarks, SILT BL, ROM intact BL, distal pulses palpable.    Imaging:  XR demonstrating R distal femur fracture
0

## 2022-08-08 NOTE — PATIENT PROFILE ADULT - NSPROMEDSADMININFO_GEN_A_NUR
FEMALE ANNUAL EXAM NOTE        HISTORY    Flavia Steiner is a 23 year old female who presents for an annual exam.    Patient states that since her bout of COVID in October of 2021, her sense of smell and taste haven't returned to her baseline. She says that some things taste normal, but some taste \"off.\" She is wondering if there is any information/suggestions on when she could expect for this to return to baseline or help get this back to baseline. Could try sucking on more acidic/citric foods to help the salivary gland production, but this is not something that completely brings back taste/smell.    Patient is considering stopping her current birth control form. She says that this was started to help regulate her irregular periods. She is worried that being on this longer term could affect her fertility in the future if she decides to have children. She is wondering if birth control negatively affects other issues/hormones with longer term use. I don't believe her continuing this longer term would negatively affect fertility. She would like to continue her current birth control method as her periods symptoms prior to starting this were quite miserable. She continues the Seasonique 0.15-0.03 & 0.01mg tab birth control tablets daily. She says that she did miss a couple doses of her birth control tablets, and was under a lot of stress with a couple family members passing and a close family member wedding occurring. She says that she did have an extra period this last month and wonders if this is related to the missed doses/restarting it in the middle of a cycle/recent stressors. Break through bleeding could be related to all of the above.      Her last pap was done 2/17/20 and was normal with negative HPV testing. She is due to update her pap any time after 2/17/2023.     She continues the Flovent HFA inhaler 1 puff twice daily along with the ProAir HFA inhaler 2 puffs every 4 hours as needed for asthma control.      She has not gotten the COVID vaccine at this time, and is not interested in getting it at this time.     She says that her mother had colon cancer diagnosed at age 38. Patient believes this was in her 30's. She is unsure what age she was diagnosed and doesn't know who reported this at age 38. She will inquire what age her mother was diagnosed and will either send a message with this information through her patient portal, or will call. Advised that whatever age she was diagnosed, she should start her colorectal screenings 10 years prior to that age. For example, if her mother was diagnosed at age 38, she would be recommended to start screening at age 28.    She says that she is up to date on her eye exam and dental exam/cleanings.    Patient does continue regular self breast examinations after I recommended doing so at last years physical appointment.     She says that she was concerned she was underweight prior to seeing me. She feels that since starting daily birth control has helped with this along with making dietary changes. Her BMI is within normal range at 23.94% at 122 lbs. She does walk a couple times per week for regular exercise. Compared to 9/4/2020, she is up about 10.5 lbs.     MEDICAL HISTORY    History reviewed. No pertinent past medical history.    SURGICAL HISTORY    History reviewed. No pertinent surgical history.    MEDICATIONS    Outpatient Medications Marked as Taking for the 8/8/22 encounter (Office Visit) with Mian Pandey MD   Medication Sig Dispense Refill   • levonorgestrel-eth estradiol & eth estradiol (Seasonique) 0.15-0.03 &0.01 MG tablet Take 1 tablet by mouth daily. 90 tablet 3   • albuterol (PROAIR HFA) 108 (90 Base) MCG/ACT inhaler Inhale 2 puffs into the lungs every 4 hours as needed for Shortness of Breath or Wheezing. 1 Inhaler 1   • fluticasone (FLOVENT HFA) 110 MCG/ACT inhaler Inhale 1 puff into the lungs 2 times daily. 12 g 1       ALLERGIES    ALLERGIES:   Allergen  Reactions   • Dust Other (See Comments)   • Grass Other (See Comments)   • Penicillin G HIVES and SWELLING   • Trees Other (See Comments)       SOCIAL HISTORY    Social History     Tobacco Use   • Smoking status: Never Smoker   • Smokeless tobacco: Never Used   Vaping Use   • Vaping Use: Former   Substance Use Topics   • Alcohol use: Not Currently   • Drug use: Never       FAMILY HISTORY    Family History   Problem Relation Age of Onset   • Cancer, Colon Mother 38   • Heart Sister         Murmur       REVIEW OF SYSTEMS    Constitutional:  No loss of appetite.  No abnormal weight gain.  No abnormal weight loss.  No fevers.  No night sweats.  No weakness.  No fatigue.  Eyes:  No blindness.  No blurred vision.  No double vision.  No pain.  No itching.  No burning.  HENT:  Head:  No facial pain.  Ears:  No ear pain.  No hearing loss or difficulty.  No tinnitus.  No discharge.  Nose:  No nasal congestion.  No rhinorrhea.  No epistaxis.  No sinus pain.  Mouth:  No mouth lesions.  No sore throat.  No difficulty swallowing.  Respiratory:  No SOB.  No cough.  No wheezing.  No sputum production.  No hemoptysis.  Cardiovascular:  No chest pains.  No palpitations.  No orthopnea.  No tachycardia.  No edema.  No cyanosis.  No vertigo.  Gastrointestinal:  No abdominal pain.  No heartburn.  No nausea.  No vomiting.  No diarrhea.  No constipation.  No change in bowel habits.  No hematemesis.  No blood in stool.  Musculoskeletal:  No back pain.  No joint pain.  No joint swelling or tenderness.  No muscle pains or spasms.  No neck pain.  No neck stiffness.  No neck swelling.  Neurologic:  No numbness, no tingling, no other sensory changes, no sudden weakness in arms or legs.  No confusion, no headache, no dizziness, no memory loss or tremors.  Genitourinary:  No dysuria.  No hematuria.  No urinary frequency.  No nocturia.  No urgency.  No incontinence.  Hematologic/Lymph:  No easy bruising or bleeding.  No swollen lymph  glands.  Endocrine:  No heat or cold intolerance.  No polydipsia.  No polyuria.  No changes in hair or skin texture.  Integument:  No new rashes or lesions.  No color change.  No pruritus.  No dryness of skin.  Psychiatric:  No anxiety.  No depression.  No hallucinations.  No irritability.  No insomnia.  Gyn:  LMP: Periods: regular, monthly. No vaginal discharge.  No pain with sex.  No breast tenderness.  Last Pap: 2/17/20.  Allergic/Immunologic:  No recurrent infections.  No hypersensitivity.      PHYSICAL EXAM    Vital Signs:    Vitals:    08/08/22 1336   BP: 110/80   Pulse: 77   Temp: 98.9 °F (37.2 °C)   TempSrc: Temporal   SpO2: 99%   Weight: 55.6 kg (122 lb 9.6 oz)   Height: 5' (1.524 m)   LMP: 08/01/2022     General:  Well developed, well nourished.  In no apparent distress.  Eyes:  PERRL, EOMI.  Conjunctivae pink.  Sclerae anicteric.  HENT:  Normocephalic.  Atraumatic.  Bilateral external ears are normal.  TMs within normal limits bilateral.  Lips, teeth, and gums within normal limits.  Mucosal membranes moist.  Nose appears normal.  Oropharynx within normal limits.  Neck:  Supple.  Nontender.  No thyromegaly.  Trachea midline.  No carotid bruits auscultated.  Respiratory:  Normal respiratory effort.  No chest wall tenderness.  Lungs clear to auscultation bilaterally.  Cardiovascular:  Regular rate and rhythm.  No murmurs, rubs or gallops.  Normal S1 and S2.  Good dorsalis pedis pulses bilaterally.  No peripheral edema.  Gastrointestinal:  Soft.  Nontender in all 4 quadrants.  Nondistended.  Normal bowel sounds.  No pulsatile or other abdominal masses.  No hepatosplenomegaly.    Breasts:  No dimpling or puckering noted in sitting position.  No nodules or tenderness palpated.  No axillary lymphadenopathy.  : Deferred. No  symptom complaints today.  Musculoskeletal:  Full range of motion in all 4 extremities proximal and distal.  Back in normal alignment.  No paraspinal tenderness palpated.  Neurologic:   Gait is normal.  No motor deficits in all 4 extremities.  Symmetrical bilateral biceps, brachioradialis, knee and ankle DTRs.  Integumentary:  Warm.  Dry.  Pink.  No rashes or lesions.  No wounds.  Lymphatic:  No cervical lymphadenopathy.  No supraclavicular or infraclavicular lymphadenopathy.  No axillary or inguinal lymphadenopathy.  Psychiatric:  Cooperative.  Appropriate mood and affect.  Normal judgment.        ASSESSMENT/PLAN:  1. Annual physical exam - Discussed age appropriate health maintenance. Refuses COVID vaccine.    2. History of COVID-19 - October 2021, has had problems with loss of taste/smell not returning to baseline yet. Did suggest she could try sucking on more acidic/citric foods to see if this helps, but this hasn't been shown to significantly improve symptoms. Could take longer time to return to baseline, if it does improve to baseline at all. Refuses COVID vaccine.   3. Acne vulgaris - Managing. No concerns with her skin and skin is clear.    4. Menorrhagia with irregular cycle - Will continue Seasonique 0.15-0.03 & 0.01mg tab daily. Refills for the year sent.    5. Family history of colon cancer in mother - Will find what age her mother was diagnosed and will either message/call with that age. 10 years prior to that age, would be indicated to start regular colorectal screening  would be indicated.        Orders Placed This Encounter   • levonorgestrel-eth estradiol & eth estradiol (Seasonique) 0.15-0.03 &0.01 MG tablet     Return in about 1 year (around 8/8/2023) for CPE + PAP .    On 8/8/2022, ILindsey scribed the services personally performed by Dr. Mian Pandey.  I have reviewed and edited the visit summary above and attest that it is accurate.       no concerns

## 2023-03-27 NOTE — PHYSICAL THERAPY INITIAL EVALUATION ADULT - GAIT TRAINING, PT EVAL
Goal: Pt will amb 50 ft independently with RW in 4 weeks. Suturegard Intro: Intraoperative tissue expansion was performed, utilizing the SUTUREGARD device, in order to reduce wound tension.

## 2023-08-13 NOTE — BEHAVIORAL HEALTH ASSESSMENT NOTE - NS ED BHA REVIEW OF ED CHART AVAILABLE LABS REVIEWED
Awaiting superior arrival for transport home, pt sleeping at bedside, resp regular and unlabored with regular chest rise and fall    Yes

## 2023-08-24 NOTE — H&P ADULT - NSHPATTENDINGPLANDISCUSS_GEN_ALL_CORE
NP. Dr. Jaramillo who requested for initial evaluation/H&P and will assume care of this patient in 8/9/17 am. Stable Neli [id number]. Dr. Jaramillo who requested for initial evaluation/H&P and will assume care of this patient in 8/9/17 am.

## 2024-02-26 NOTE — PATIENT PROFILE ADULT - FALL HARM RISK TYPE OF ASSESSMENT
Yandy Jesus (:  1959) is a 64 y.o. female,Established patient, here for evaluation of the following chief complaint(s):  Epistaxis (2 episodes this past week) and Discuss Medications (Requesting medication for weight loss / also states that amlodipine causing swelling.)        Subjective   SUBJECTIVE/OBJECTIVE:  HPI  Patient is here for a follow-up today.  She feels well.  She is having some bilateral leg swelling.  No shortness of breath.  No chest pain.  She stated she had nose bleeds a couple of times over the last week mostly from her right nostril.  Review of Systems   Constitutional:  Negative for chills, fatigue, fever and unexpected weight change.   HENT:  Positive for nosebleeds. Negative for congestion, postnasal drip, rhinorrhea, sore throat, trouble swallowing and voice change.    Eyes:  Negative for photophobia, pain and visual disturbance.   Respiratory:  Negative for cough, chest tightness, shortness of breath and wheezing.    Cardiovascular:  Negative for chest pain and palpitations.   Gastrointestinal:  Negative for abdominal pain, blood in stool, constipation, diarrhea, nausea and vomiting.   Endocrine: Negative for heat intolerance, polydipsia and polyuria.   Genitourinary:  Negative for difficulty urinating, dysuria, frequency, hematuria and urgency.   Musculoskeletal:  Negative for arthralgias, back pain, neck pain and neck stiffness.   Skin:  Negative for pallor.   Neurological: Negative.  Negative for dizziness and light-headedness.   Hematological:  Does not bruise/bleed easily.          Objective   /76   Pulse 74   Temp 97.7 °F (36.5 °C) (Temporal)   Ht 1.6 m (5' 3\")   BMI 55.62 kg/m²       Physical Exam  Constitutional:       Appearance: Normal appearance.   HENT:      Head: Normocephalic and atraumatic.      Nose:      Comments: Dry nasal mucosa  No active bleeding no scabs     Mouth/Throat:      Pharynx: Oropharynx is clear.   Eyes:      Extraocular Movements: 
admission

## 2024-02-27 NOTE — PROGRESS NOTE ADULT - SUBJECTIVE AND OBJECTIVE BOX
CARDIOLOGY     PROGRESS  NOTE   ________________________________________________    CHIEF COMPLAINT:Patient is a 81y old  Female who presents with a chief complaint of change of mental status (18 Nov 2021 18:33)  doing better, more awake  	  REVIEW OF SYSTEMS:  CONSTITUTIONAL: No fever, weight loss, or fatigue  EYES: No eye pain, visual disturbances, or discharge  ENT:  No difficulty hearing, tinnitus, vertigo; No sinus or throat pain  NECK: No pain or stiffness  RESPIRATORY: No cough, wheezing, chills or hemoptysis; No Shortness of Breath  CARDIOVASCULAR: No chest pain, palpitations, passing out, dizziness, or leg swelling  GASTROINTESTINAL: No abdominal or epigastric pain. No nausea, vomiting, or hematemesis; No diarrhea or constipation. No melena or hematochezia.  GENITOURINARY: No dysuria, frequency, hematuria, or incontinence  NEUROLOGICAL: No headaches, memory loss, loss of strength, numbness, or tremors  SKIN: No itching, burning, rashes, or lesions   LYMPH Nodes: No enlarged glands  ENDOCRINE: No heat or cold intolerance; No hair loss  MUSCULOSKELETAL: No joint pain or swelling; No muscle, back, or extremity pain  PSYCHIATRIC: No depression, anxiety, mood swings, or difficulty sleeping  HEME/LYMPH: No easy bruising, or bleeding gums  ALLERGY AND IMMUNOLOGIC: No hives or eczema	    [ ] All others negative	  [ ] Unable to obtain    PHYSICAL EXAM:  T(C): 36.7 (11-19-21 @ 08:17), Max: 37.1 (11-19-21 @ 01:20)  HR: 94 (11-19-21 @ 08:17) (67 - 94)  BP: 121/89 (11-19-21 @ 08:17) (113/66 - 170/90)  RR: 18 (11-19-21 @ 08:17) (15 - 18)  SpO2: 96% (11-19-21 @ 08:17) (84% - 100%)  Wt(kg): --  I&O's Summary    18 Nov 2021 07:01  -  19 Nov 2021 07:00  --------------------------------------------------------  IN: 0 mL / OUT: 700 mL / NET: -700 mL        Appearance: Normal	  HEENT:   Normal oral mucosa, PERRL, EOMI	  Lymphatic: No lymphadenopathy  Cardiovascular: Normal S1 S2, No JVD, + murmurs, No edema  Respiratory: Lungs clear to auscultation	  Psychiatry: more awake and alert  Gastrointestinal:  Soft, Non-tender, + BS	  Skin: No rashes, No ecchymoses, No cyanosis	  Neurologic: Non-focal  Extremities: Normal range of motion, No clubbing, cyanosis or edema  Vascular: Peripheral pulses palpable 2+ bilaterally    MEDICATIONS  (STANDING):  aspirin enteric coated 81 milliGRAM(s) Oral daily  baclofen 10 milliGRAM(s) Oral two times a day  diltiazem    milliGRAM(s) Oral daily  DULoxetine 60 milliGRAM(s) Oral daily  fentaNYL   Patch  50 MICROgram(s)/Hr 1 Patch Transdermal every 72 hours  furosemide    Tablet 40 milliGRAM(s) Oral daily  mirtazapine 15 milliGRAM(s) Oral at bedtime  pantoprazole    Tablet 40 milliGRAM(s) Oral before breakfast  polyethylene glycol 3350 17 Gram(s) Oral at bedtime  rivaroxaban 10 milliGRAM(s) Oral daily      TELEMETRY: 	    ECG:  	  RADIOLOGY:  OTHER: 	  	  LABS:	 	    CARDIAC MARKERS:  CARDIAC MARKERS ( 18 Nov 2021 14:58 )  x     / x     / 62 U/L / x     / 3.7 ng/mL  CARDIAC MARKERS ( 18 Nov 2021 11:38 )  x     / x     / 90 U/L / x     / 6.8 ng/mL                                13.0   10.88 )-----------( 249      ( 19 Nov 2021 06:52 )             40.2     11-18    141  |  104  |  21  ----------------------------<  94  3.9   |  26  |  1.22    Ca    9.0      18 Nov 2021 11:38    TPro  6.4  /  Alb  3.4  /  TBili  0.5  /  DBili  x   /  AST  21  /  ALT  8<L>  /  AlkPhos  71  11-18    proBNP: Serum Pro-Brain Natriuretic Peptide: 2012 pg/mL (11-18 @ 14:58)  Serum Pro-Brain Natriuretic Peptide: 4144 pg/mL (11-18 @ 11:38)    Lipid Profile:   HgA1c:   TSH:   PT/INR - ( 18 Nov 2021 11:38 )   PT: 15.9 sec;   INR: 1.34 ratio         PTT - ( 18 Nov 2021 11:38 )  PTT:28.0 sec  []Infectious/metabolic per ED  []Consider rEEG  []MRI brain w/ w/o  []Case to be discussed w/ neuro attending    Assessment and plan  ---------------------------  82 yo f bilateral knee arthritis,  HTN, remote breast ca s/p mastectomy and LN resection, provoked PE 1993, chronic pain, presents with inability to get out of bed and a stroke code was called on her. Pt with daughter states that last known well was 1 am, and states at 3 am, pt woke up and was unable to get out of bed with her walker as she normally does. She did not respond to her family members, was able to follow commands, but denies any slurring speech, or one-sided weakness. Daughter denies any n/v/f/c, cp. palpitations. Stroke code showed no acute findings.  pt apparently had disorientation and lethargy since early thi am , and no responding  to verbal stimuli and being disoriented.  pt daughter claims as she was given Lyrica she has had moments of lethargy.  pt awake and alert now in ER.  change of mental status seen by neuro  r/o seizure disorder check EEG  continue all pain meds, hold Lyrica  pain consult  continue ac  check labs  urology  eval   pain management    	         Tried to reach patient son again to notify about MRI.

## 2025-05-15 ENCOUNTER — INPATIENT (INPATIENT)
Facility: HOSPITAL | Age: 85
LOS: 8 days | Discharge: NURSING, REHAB OR LONG-TERM CARE | DRG: 313 | End: 2025-05-24
Attending: INTERNAL MEDICINE | Admitting: INTERNAL MEDICINE
Payer: MEDICARE

## 2025-05-15 VITALS
HEART RATE: 80 BPM | RESPIRATION RATE: 15 BRPM | DIASTOLIC BLOOD PRESSURE: 88 MMHG | TEMPERATURE: 98 F | OXYGEN SATURATION: 97 % | SYSTOLIC BLOOD PRESSURE: 128 MMHG | HEIGHT: 67 IN | WEIGHT: 139.99 LBS

## 2025-05-15 DIAGNOSIS — Z98.891 HISTORY OF UTERINE SCAR FROM PREVIOUS SURGERY: Chronic | ICD-10-CM

## 2025-05-15 DIAGNOSIS — Z98.890 OTHER SPECIFIED POSTPROCEDURAL STATES: Chronic | ICD-10-CM

## 2025-05-15 DIAGNOSIS — Z90.11 ACQUIRED ABSENCE OF RIGHT BREAST AND NIPPLE: Chronic | ICD-10-CM

## 2025-05-15 DIAGNOSIS — Z90.710 ACQUIRED ABSENCE OF BOTH CERVIX AND UTERUS: Chronic | ICD-10-CM

## 2025-05-15 DIAGNOSIS — H26.9 UNSPECIFIED CATARACT: Chronic | ICD-10-CM

## 2025-05-15 DIAGNOSIS — R07.9 CHEST PAIN, UNSPECIFIED: ICD-10-CM

## 2025-05-15 DIAGNOSIS — Z96.649 PRESENCE OF UNSPECIFIED ARTIFICIAL HIP JOINT: Chronic | ICD-10-CM

## 2025-05-15 LAB
ADD ON TEST-SPECIMEN IN LAB: SIGNIFICANT CHANGE UP
ALBUMIN SERPL ELPH-MCNC: 3.4 G/DL — SIGNIFICANT CHANGE UP (ref 3.3–5)
ALP SERPL-CCNC: 43 U/L — SIGNIFICANT CHANGE UP (ref 40–120)
ALT FLD-CCNC: 8 U/L — LOW (ref 10–45)
ANION GAP SERPL CALC-SCNC: 15 MMOL/L — SIGNIFICANT CHANGE UP (ref 5–17)
APTT BLD: 30.6 SEC — SIGNIFICANT CHANGE UP (ref 26.1–36.8)
AST SERPL-CCNC: 17 U/L — SIGNIFICANT CHANGE UP (ref 10–40)
BASOPHILS # BLD AUTO: 0.06 K/UL — SIGNIFICANT CHANGE UP (ref 0–0.2)
BASOPHILS NFR BLD AUTO: 0.8 % — SIGNIFICANT CHANGE UP (ref 0–2)
BILIRUB SERPL-MCNC: 0.5 MG/DL — SIGNIFICANT CHANGE UP (ref 0.2–1.2)
BUN SERPL-MCNC: 17 MG/DL — SIGNIFICANT CHANGE UP (ref 7–23)
CALCIUM SERPL-MCNC: 9.5 MG/DL — SIGNIFICANT CHANGE UP (ref 8.4–10.5)
CHLORIDE SERPL-SCNC: 100 MMOL/L — SIGNIFICANT CHANGE UP (ref 96–108)
CO2 SERPL-SCNC: 23 MMOL/L — SIGNIFICANT CHANGE UP (ref 22–31)
CREAT SERPL-MCNC: 0.94 MG/DL — SIGNIFICANT CHANGE UP (ref 0.5–1.3)
EGFR: 59 ML/MIN/1.73M2 — LOW
EGFR: 59 ML/MIN/1.73M2 — LOW
EOSINOPHIL # BLD AUTO: 0.15 K/UL — SIGNIFICANT CHANGE UP (ref 0–0.5)
EOSINOPHIL NFR BLD AUTO: 1.9 % — SIGNIFICANT CHANGE UP (ref 0–6)
GAS PNL BLDV: SIGNIFICANT CHANGE UP
GLUCOSE SERPL-MCNC: 96 MG/DL — SIGNIFICANT CHANGE UP (ref 70–99)
HCT VFR BLD CALC: 36.7 % — SIGNIFICANT CHANGE UP (ref 34.5–45)
HGB BLD-MCNC: 11.4 G/DL — LOW (ref 11.5–15.5)
IMM GRANULOCYTES NFR BLD AUTO: 0.3 % — SIGNIFICANT CHANGE UP (ref 0–0.9)
INR BLD: 2.55 RATIO — HIGH (ref 0.85–1.16)
LIDOCAIN IGE QN: 37 U/L — SIGNIFICANT CHANGE UP (ref 7–60)
LYMPHOCYTES # BLD AUTO: 2.62 K/UL — SIGNIFICANT CHANGE UP (ref 1–3.3)
LYMPHOCYTES # BLD AUTO: 33.1 % — SIGNIFICANT CHANGE UP (ref 13–44)
MCHC RBC-ENTMCNC: 29.5 PG — SIGNIFICANT CHANGE UP (ref 27–34)
MCHC RBC-ENTMCNC: 31.1 G/DL — LOW (ref 32–36)
MCV RBC AUTO: 95.1 FL — SIGNIFICANT CHANGE UP (ref 80–100)
MONOCYTES # BLD AUTO: 0.71 K/UL — SIGNIFICANT CHANGE UP (ref 0–0.9)
MONOCYTES NFR BLD AUTO: 9 % — SIGNIFICANT CHANGE UP (ref 2–14)
NEUTROPHILS # BLD AUTO: 4.35 K/UL — SIGNIFICANT CHANGE UP (ref 1.8–7.4)
NEUTROPHILS NFR BLD AUTO: 54.9 % — SIGNIFICANT CHANGE UP (ref 43–77)
NRBC BLD AUTO-RTO: 0 /100 WBCS — SIGNIFICANT CHANGE UP (ref 0–0)
NT-PROBNP SERPL-SCNC: 165 PG/ML — SIGNIFICANT CHANGE UP (ref 0–300)
PLATELET # BLD AUTO: 254 K/UL — SIGNIFICANT CHANGE UP (ref 150–400)
POTASSIUM SERPL-MCNC: 4 MMOL/L — SIGNIFICANT CHANGE UP (ref 3.5–5.3)
POTASSIUM SERPL-SCNC: 4 MMOL/L — SIGNIFICANT CHANGE UP (ref 3.5–5.3)
PROT SERPL-MCNC: 6.6 G/DL — SIGNIFICANT CHANGE UP (ref 6–8.3)
PROTHROM AB SERPL-ACNC: 29 SEC — HIGH (ref 9.9–13.4)
RBC # BLD: 3.86 M/UL — SIGNIFICANT CHANGE UP (ref 3.8–5.2)
RBC # FLD: 14.4 % — SIGNIFICANT CHANGE UP (ref 10.3–14.5)
SODIUM SERPL-SCNC: 138 MMOL/L — SIGNIFICANT CHANGE UP (ref 135–145)
TROPONIN T, HIGH SENSITIVITY RESULT: 18 NG/L — SIGNIFICANT CHANGE UP (ref 0–51)
TROPONIN T, HIGH SENSITIVITY RESULT: 23 NG/L — SIGNIFICANT CHANGE UP (ref 0–51)
WBC # BLD: 7.91 K/UL — SIGNIFICANT CHANGE UP (ref 3.8–10.5)
WBC # FLD AUTO: 7.91 K/UL — SIGNIFICANT CHANGE UP (ref 3.8–10.5)

## 2025-05-15 PROCEDURE — 71045 X-RAY EXAM CHEST 1 VIEW: CPT | Mod: 26

## 2025-05-15 PROCEDURE — 70450 CT HEAD/BRAIN W/O DYE: CPT | Mod: 26

## 2025-05-15 PROCEDURE — 99285 EMERGENCY DEPT VISIT HI MDM: CPT | Mod: GC

## 2025-05-15 PROCEDURE — 93010 ELECTROCARDIOGRAM REPORT: CPT

## 2025-05-15 PROCEDURE — 71275 CT ANGIOGRAPHY CHEST: CPT | Mod: 26

## 2025-05-15 RX ORDER — HYDROMORPHONE/SOD CHLOR,ISO/PF 2 MG/10 ML
4 SYRINGE (ML) INJECTION AT BEDTIME
Refills: 0 | Status: DISCONTINUED | OUTPATIENT
Start: 2025-05-15 | End: 2025-05-17

## 2025-05-15 RX ORDER — ACETAMINOPHEN 500 MG/5ML
650 LIQUID (ML) ORAL EVERY 6 HOURS
Refills: 0 | Status: DISCONTINUED | OUTPATIENT
Start: 2025-05-15 | End: 2025-05-24

## 2025-05-15 RX ORDER — HYDROMORPHONE/SOD CHLOR,ISO/PF 2 MG/10 ML
2 SYRINGE (ML) INJECTION DAILY
Refills: 0 | Status: DISCONTINUED | OUTPATIENT
Start: 2025-05-15 | End: 2025-05-17

## 2025-05-15 RX ORDER — MIRTAZAPINE 30 MG/1
15 TABLET, FILM COATED ORAL ONCE
Refills: 0 | Status: COMPLETED | OUTPATIENT
Start: 2025-05-15 | End: 2025-05-15

## 2025-05-15 RX ORDER — BACLOFEN 10 MG/20ML
10 INJECTION INTRATHECAL EVERY 12 HOURS
Refills: 0 | Status: DISCONTINUED | OUTPATIENT
Start: 2025-05-15 | End: 2025-05-24

## 2025-05-15 RX ORDER — SPIRONOLACTONE 25 MG
25 TABLET ORAL DAILY
Refills: 0 | Status: DISCONTINUED | OUTPATIENT
Start: 2025-05-15 | End: 2025-05-21

## 2025-05-15 RX ORDER — LOSARTAN POTASSIUM 100 MG/1
25 TABLET, FILM COATED ORAL DAILY
Refills: 0 | Status: DISCONTINUED | OUTPATIENT
Start: 2025-05-15 | End: 2025-05-17

## 2025-05-15 RX ORDER — METOCLOPRAMIDE HCL 10 MG
10 TABLET ORAL ONCE
Refills: 0 | Status: COMPLETED | OUTPATIENT
Start: 2025-05-15 | End: 2025-05-15

## 2025-05-15 RX ORDER — MELATONIN 5 MG
3 TABLET ORAL AT BEDTIME
Refills: 0 | Status: DISCONTINUED | OUTPATIENT
Start: 2025-05-15 | End: 2025-05-24

## 2025-05-15 RX ORDER — DULOXETINE 20 MG/1
60 CAPSULE, DELAYED RELEASE ORAL DAILY
Refills: 0 | Status: DISCONTINUED | OUTPATIENT
Start: 2025-05-15 | End: 2025-05-24

## 2025-05-15 RX ORDER — METOPROLOL SUCCINATE 50 MG/1
25 TABLET, EXTENDED RELEASE ORAL DAILY
Refills: 0 | Status: DISCONTINUED | OUTPATIENT
Start: 2025-05-15 | End: 2025-05-17

## 2025-05-15 RX ORDER — ASPIRIN 325 MG
81 TABLET ORAL DAILY
Refills: 0 | Status: DISCONTINUED | OUTPATIENT
Start: 2025-05-15 | End: 2025-05-24

## 2025-05-15 RX ORDER — FUROSEMIDE 10 MG/ML
40 INJECTION INTRAMUSCULAR; INTRAVENOUS DAILY
Refills: 0 | Status: DISCONTINUED | OUTPATIENT
Start: 2025-05-15 | End: 2025-05-17

## 2025-05-15 RX ORDER — ACETAMINOPHEN 500 MG/5ML
1000 LIQUID (ML) ORAL ONCE
Refills: 0 | Status: COMPLETED | OUTPATIENT
Start: 2025-05-15 | End: 2025-05-15

## 2025-05-15 RX ORDER — RIVAROXABAN 10 MG/1
10 TABLET, FILM COATED ORAL ONCE
Refills: 0 | Status: COMPLETED | OUTPATIENT
Start: 2025-05-15 | End: 2025-05-15

## 2025-05-15 RX ADMIN — RIVAROXABAN 10 MILLIGRAM(S): 10 TABLET, FILM COATED ORAL at 23:41

## 2025-05-15 RX ADMIN — MIRTAZAPINE 15 MILLIGRAM(S): 30 TABLET, FILM COATED ORAL at 23:41

## 2025-05-15 RX ADMIN — Medication 4 MILLIGRAM(S): at 22:45

## 2025-05-15 RX ADMIN — Medication 10 MILLIGRAM(S): at 20:00

## 2025-05-15 RX ADMIN — Medication 400 MILLIGRAM(S): at 17:33

## 2025-05-15 RX ADMIN — Medication 4 MILLIGRAM(S): at 23:00

## 2025-05-15 RX ADMIN — Medication 20 MILLIGRAM(S): at 17:34

## 2025-05-16 ENCOUNTER — RESULT REVIEW (OUTPATIENT)
Age: 85
End: 2025-05-16

## 2025-05-16 LAB
ANION GAP SERPL CALC-SCNC: 12 MMOL/L — SIGNIFICANT CHANGE UP (ref 5–17)
BUN SERPL-MCNC: 16 MG/DL — SIGNIFICANT CHANGE UP (ref 7–23)
CALCIUM SERPL-MCNC: 9.3 MG/DL — SIGNIFICANT CHANGE UP (ref 8.4–10.5)
CHLORIDE SERPL-SCNC: 104 MMOL/L — SIGNIFICANT CHANGE UP (ref 96–108)
CK MB CFR SERPL CALC: 1.6 NG/ML — SIGNIFICANT CHANGE UP (ref 0–3.8)
CK SERPL-CCNC: 33 U/L — SIGNIFICANT CHANGE UP (ref 25–170)
CO2 SERPL-SCNC: 23 MMOL/L — SIGNIFICANT CHANGE UP (ref 22–31)
CREAT SERPL-MCNC: 0.96 MG/DL — SIGNIFICANT CHANGE UP (ref 0.5–1.3)
EGFR: 58 ML/MIN/1.73M2 — LOW
EGFR: 58 ML/MIN/1.73M2 — LOW
GLUCOSE SERPL-MCNC: 95 MG/DL — SIGNIFICANT CHANGE UP (ref 70–99)
POTASSIUM SERPL-MCNC: 3.9 MMOL/L — SIGNIFICANT CHANGE UP (ref 3.5–5.3)
POTASSIUM SERPL-SCNC: 3.9 MMOL/L — SIGNIFICANT CHANGE UP (ref 3.5–5.3)
SODIUM SERPL-SCNC: 139 MMOL/L — SIGNIFICANT CHANGE UP (ref 135–145)
TROPONIN T, HIGH SENSITIVITY RESULT: 30 NG/L — SIGNIFICANT CHANGE UP (ref 0–51)
TROPONIN T, HIGH SENSITIVITY RESULT: 31 NG/L — SIGNIFICANT CHANGE UP (ref 0–51)

## 2025-05-16 PROCEDURE — 93306 TTE W/DOPPLER COMPLETE: CPT | Mod: 26

## 2025-05-16 PROCEDURE — 93010 ELECTROCARDIOGRAM REPORT: CPT

## 2025-05-16 RX ORDER — RIVAROXABAN 10 MG/1
10 TABLET, FILM COATED ORAL DAILY
Refills: 0 | Status: DISCONTINUED | OUTPATIENT
Start: 2025-05-16 | End: 2025-05-24

## 2025-05-16 RX ORDER — B1/B2/B3/B5/B6/B12/VIT C/FOLIC 500-0.5 MG
1 TABLET ORAL DAILY
Refills: 0 | Status: DISCONTINUED | OUTPATIENT
Start: 2025-05-16 | End: 2025-05-24

## 2025-05-16 RX ADMIN — Medication 1 TABLET(S): at 17:40

## 2025-05-16 RX ADMIN — BACLOFEN 10 MILLIGRAM(S): 10 INJECTION INTRATHECAL at 17:40

## 2025-05-16 RX ADMIN — Medication 25 MILLIGRAM(S): at 05:41

## 2025-05-16 RX ADMIN — Medication 500 MILLIGRAM(S): at 17:40

## 2025-05-16 RX ADMIN — RIVAROXABAN 10 MILLIGRAM(S): 10 TABLET, FILM COATED ORAL at 17:40

## 2025-05-16 RX ADMIN — Medication 40 MILLIGRAM(S): at 05:42

## 2025-05-16 RX ADMIN — BACLOFEN 10 MILLIGRAM(S): 10 INJECTION INTRATHECAL at 05:42

## 2025-05-16 RX ADMIN — FUROSEMIDE 40 MILLIGRAM(S): 10 INJECTION INTRAMUSCULAR; INTRAVENOUS at 05:41

## 2025-05-16 RX ADMIN — LOSARTAN POTASSIUM 25 MILLIGRAM(S): 100 TABLET, FILM COATED ORAL at 05:41

## 2025-05-16 RX ADMIN — METOPROLOL SUCCINATE 25 MILLIGRAM(S): 50 TABLET, EXTENDED RELEASE ORAL at 05:41

## 2025-05-16 RX ADMIN — Medication 650 MILLIGRAM(S): at 05:44

## 2025-05-16 RX ADMIN — Medication 650 MILLIGRAM(S): at 06:15

## 2025-05-16 RX ADMIN — DULOXETINE 60 MILLIGRAM(S): 20 CAPSULE, DELAYED RELEASE ORAL at 11:51

## 2025-05-16 RX ADMIN — Medication 81 MILLIGRAM(S): at 11:51

## 2025-05-17 LAB
GLUCOSE BLDC GLUCOMTR-MCNC: 114 MG/DL — HIGH (ref 70–99)
GLUCOSE BLDC GLUCOMTR-MCNC: 74 MG/DL — SIGNIFICANT CHANGE UP (ref 70–99)

## 2025-05-17 PROCEDURE — 93010 ELECTROCARDIOGRAM REPORT: CPT

## 2025-05-17 RX ORDER — METOPROLOL SUCCINATE 50 MG/1
25 TABLET, EXTENDED RELEASE ORAL DAILY
Refills: 0 | Status: DISCONTINUED | OUTPATIENT
Start: 2025-05-17 | End: 2025-05-24

## 2025-05-17 RX ORDER — ACETAMINOPHEN 500 MG/5ML
1000 LIQUID (ML) ORAL ONCE
Refills: 0 | Status: COMPLETED | OUTPATIENT
Start: 2025-05-17 | End: 2025-05-17

## 2025-05-17 RX ORDER — LOSARTAN POTASSIUM 100 MG/1
25 TABLET, FILM COATED ORAL DAILY
Refills: 0 | Status: DISCONTINUED | OUTPATIENT
Start: 2025-05-17 | End: 2025-05-17

## 2025-05-17 RX ADMIN — Medication 650 MILLIGRAM(S): at 06:27

## 2025-05-17 RX ADMIN — BACLOFEN 10 MILLIGRAM(S): 10 INJECTION INTRATHECAL at 08:40

## 2025-05-17 RX ADMIN — RIVAROXABAN 10 MILLIGRAM(S): 10 TABLET, FILM COATED ORAL at 12:51

## 2025-05-17 RX ADMIN — Medication 650 MILLIGRAM(S): at 06:55

## 2025-05-17 RX ADMIN — Medication 60 MILLILITER(S): at 20:09

## 2025-05-17 RX ADMIN — Medication 81 MILLIGRAM(S): at 12:53

## 2025-05-17 RX ADMIN — BACLOFEN 10 MILLIGRAM(S): 10 INJECTION INTRATHECAL at 17:13

## 2025-05-17 RX ADMIN — Medication 500 MILLILITER(S): at 08:40

## 2025-05-17 RX ADMIN — Medication 500 MILLIGRAM(S): at 12:50

## 2025-05-17 RX ADMIN — Medication 40 MILLIGRAM(S): at 06:27

## 2025-05-17 RX ADMIN — DULOXETINE 60 MILLIGRAM(S): 20 CAPSULE, DELAYED RELEASE ORAL at 12:50

## 2025-05-17 RX ADMIN — Medication 1000 MILLIGRAM(S): at 16:45

## 2025-05-17 RX ADMIN — Medication 1 TABLET(S): at 12:51

## 2025-05-17 RX ADMIN — Medication 400 MILLIGRAM(S): at 15:45

## 2025-05-18 LAB
ANION GAP SERPL CALC-SCNC: 15 MMOL/L — SIGNIFICANT CHANGE UP (ref 5–17)
BUN SERPL-MCNC: 23 MG/DL — SIGNIFICANT CHANGE UP (ref 7–23)
CALCIUM SERPL-MCNC: 9.2 MG/DL — SIGNIFICANT CHANGE UP (ref 8.4–10.5)
CHLORIDE SERPL-SCNC: 106 MMOL/L — SIGNIFICANT CHANGE UP (ref 96–108)
CO2 SERPL-SCNC: 20 MMOL/L — LOW (ref 22–31)
CREAT SERPL-MCNC: 0.97 MG/DL — SIGNIFICANT CHANGE UP (ref 0.5–1.3)
EGFR: 57 ML/MIN/1.73M2 — LOW
EGFR: 57 ML/MIN/1.73M2 — LOW
GLUCOSE SERPL-MCNC: 73 MG/DL — SIGNIFICANT CHANGE UP (ref 70–99)
HCT VFR BLD CALC: 36.4 % — SIGNIFICANT CHANGE UP (ref 34.5–45)
HGB BLD-MCNC: 11.4 G/DL — LOW (ref 11.5–15.5)
MCHC RBC-ENTMCNC: 29.2 PG — SIGNIFICANT CHANGE UP (ref 27–34)
MCHC RBC-ENTMCNC: 31.3 G/DL — LOW (ref 32–36)
MCV RBC AUTO: 93.3 FL — SIGNIFICANT CHANGE UP (ref 80–100)
NRBC BLD AUTO-RTO: 0 /100 WBCS — SIGNIFICANT CHANGE UP (ref 0–0)
PLATELET # BLD AUTO: 277 K/UL — SIGNIFICANT CHANGE UP (ref 150–400)
POTASSIUM SERPL-MCNC: 4.2 MMOL/L — SIGNIFICANT CHANGE UP (ref 3.5–5.3)
POTASSIUM SERPL-SCNC: 4.2 MMOL/L — SIGNIFICANT CHANGE UP (ref 3.5–5.3)
RBC # BLD: 3.9 M/UL — SIGNIFICANT CHANGE UP (ref 3.8–5.2)
RBC # FLD: 14.6 % — HIGH (ref 10.3–14.5)
SODIUM SERPL-SCNC: 141 MMOL/L — SIGNIFICANT CHANGE UP (ref 135–145)
WBC # BLD: 8.88 K/UL — SIGNIFICANT CHANGE UP (ref 3.8–10.5)
WBC # FLD AUTO: 8.88 K/UL — SIGNIFICANT CHANGE UP (ref 3.8–10.5)

## 2025-05-18 RX ORDER — HYDROMORPHONE/SOD CHLOR,ISO/PF 2 MG/10 ML
2 SYRINGE (ML) INJECTION THREE TIMES A DAY
Refills: 0 | Status: DISCONTINUED | OUTPATIENT
Start: 2025-05-18 | End: 2025-05-24

## 2025-05-18 RX ORDER — HYDROMORPHONE/SOD CHLOR,ISO/PF 2 MG/10 ML
2 SYRINGE (ML) INJECTION ONCE
Refills: 0 | Status: DISCONTINUED | OUTPATIENT
Start: 2025-05-18 | End: 2025-05-18

## 2025-05-18 RX ORDER — MEGESTROL ACETATE 40 MG
20 TABLET ORAL DAILY
Refills: 0 | Status: DISCONTINUED | OUTPATIENT
Start: 2025-05-18 | End: 2025-05-24

## 2025-05-18 RX ADMIN — BACLOFEN 10 MILLIGRAM(S): 10 INJECTION INTRATHECAL at 17:38

## 2025-05-18 RX ADMIN — Medication 2 MILLIGRAM(S): at 15:21

## 2025-05-18 RX ADMIN — Medication 2 MILLIGRAM(S): at 23:23

## 2025-05-18 RX ADMIN — Medication 1 TABLET(S): at 12:04

## 2025-05-18 RX ADMIN — RIVAROXABAN 10 MILLIGRAM(S): 10 TABLET, FILM COATED ORAL at 12:04

## 2025-05-18 RX ADMIN — DULOXETINE 60 MILLIGRAM(S): 20 CAPSULE, DELAYED RELEASE ORAL at 12:04

## 2025-05-18 RX ADMIN — Medication 650 MILLIGRAM(S): at 04:11

## 2025-05-18 RX ADMIN — Medication 25 MILLIGRAM(S): at 04:39

## 2025-05-18 RX ADMIN — METOPROLOL SUCCINATE 25 MILLIGRAM(S): 50 TABLET, EXTENDED RELEASE ORAL at 04:38

## 2025-05-18 RX ADMIN — Medication 20 MILLIGRAM(S): at 17:38

## 2025-05-18 RX ADMIN — Medication 650 MILLIGRAM(S): at 03:11

## 2025-05-18 RX ADMIN — Medication 2 MILLIGRAM(S): at 00:57

## 2025-05-18 RX ADMIN — Medication 2 MILLIGRAM(S): at 16:53

## 2025-05-18 RX ADMIN — Medication 2 MILLIGRAM(S): at 23:53

## 2025-05-18 RX ADMIN — Medication 81 MILLIGRAM(S): at 12:07

## 2025-05-18 RX ADMIN — Medication 500 MILLIGRAM(S): at 12:05

## 2025-05-18 RX ADMIN — Medication 2 MILLIGRAM(S): at 01:57

## 2025-05-18 RX ADMIN — Medication 40 MILLIGRAM(S): at 04:38

## 2025-05-18 RX ADMIN — BACLOFEN 10 MILLIGRAM(S): 10 INJECTION INTRATHECAL at 04:39

## 2025-05-19 ENCOUNTER — TRANSCRIPTION ENCOUNTER (OUTPATIENT)
Age: 85
End: 2025-05-19

## 2025-05-19 LAB
ANION GAP SERPL CALC-SCNC: 10 MMOL/L — SIGNIFICANT CHANGE UP (ref 5–17)
BUN SERPL-MCNC: 21 MG/DL — SIGNIFICANT CHANGE UP (ref 7–23)
CALCIUM SERPL-MCNC: 9 MG/DL — SIGNIFICANT CHANGE UP (ref 8.4–10.5)
CHLORIDE SERPL-SCNC: 109 MMOL/L — HIGH (ref 96–108)
CO2 SERPL-SCNC: 21 MMOL/L — LOW (ref 22–31)
CREAT SERPL-MCNC: 0.92 MG/DL — SIGNIFICANT CHANGE UP (ref 0.5–1.3)
EGFR: 61 ML/MIN/1.73M2 — SIGNIFICANT CHANGE UP
EGFR: 61 ML/MIN/1.73M2 — SIGNIFICANT CHANGE UP
GLUCOSE SERPL-MCNC: 84 MG/DL — SIGNIFICANT CHANGE UP (ref 70–99)
POTASSIUM SERPL-MCNC: 4.2 MMOL/L — SIGNIFICANT CHANGE UP (ref 3.5–5.3)
POTASSIUM SERPL-SCNC: 4.2 MMOL/L — SIGNIFICANT CHANGE UP (ref 3.5–5.3)
SODIUM SERPL-SCNC: 140 MMOL/L — SIGNIFICANT CHANGE UP (ref 135–145)

## 2025-05-19 RX ORDER — FLUTICASONE PROPIONATE 50 UG/1
1 SPRAY, METERED NASAL
Refills: 0 | Status: DISCONTINUED | OUTPATIENT
Start: 2025-05-19 | End: 2025-05-24

## 2025-05-19 RX ADMIN — Medication 500 MILLIGRAM(S): at 12:15

## 2025-05-19 RX ADMIN — METOPROLOL SUCCINATE 25 MILLIGRAM(S): 50 TABLET, EXTENDED RELEASE ORAL at 05:36

## 2025-05-19 RX ADMIN — Medication 2 MILLIGRAM(S): at 12:18

## 2025-05-19 RX ADMIN — BACLOFEN 10 MILLIGRAM(S): 10 INJECTION INTRATHECAL at 17:45

## 2025-05-19 RX ADMIN — BACLOFEN 10 MILLIGRAM(S): 10 INJECTION INTRATHECAL at 05:35

## 2025-05-19 RX ADMIN — Medication 2 MILLIGRAM(S): at 21:08

## 2025-05-19 RX ADMIN — Medication 20 MILLIGRAM(S): at 12:15

## 2025-05-19 RX ADMIN — Medication 81 MILLIGRAM(S): at 12:18

## 2025-05-19 RX ADMIN — Medication 2 MILLIGRAM(S): at 20:37

## 2025-05-19 RX ADMIN — Medication 2 MILLIGRAM(S): at 12:48

## 2025-05-19 RX ADMIN — RIVAROXABAN 10 MILLIGRAM(S): 10 TABLET, FILM COATED ORAL at 12:15

## 2025-05-19 RX ADMIN — Medication 25 MILLIGRAM(S): at 05:36

## 2025-05-19 RX ADMIN — Medication 1 TABLET(S): at 12:15

## 2025-05-19 RX ADMIN — Medication 40 MILLIGRAM(S): at 05:36

## 2025-05-19 RX ADMIN — DULOXETINE 60 MILLIGRAM(S): 20 CAPSULE, DELAYED RELEASE ORAL at 12:16

## 2025-05-20 ENCOUNTER — TRANSCRIPTION ENCOUNTER (OUTPATIENT)
Age: 85
End: 2025-05-20

## 2025-05-20 LAB
ANION GAP SERPL CALC-SCNC: 8 MMOL/L — SIGNIFICANT CHANGE UP (ref 5–17)
BUN SERPL-MCNC: 27 MG/DL — HIGH (ref 7–23)
CALCIUM SERPL-MCNC: 9.6 MG/DL — SIGNIFICANT CHANGE UP (ref 8.4–10.5)
CHLORIDE SERPL-SCNC: 107 MMOL/L — SIGNIFICANT CHANGE UP (ref 96–108)
CO2 SERPL-SCNC: 24 MMOL/L — SIGNIFICANT CHANGE UP (ref 22–31)
CREAT SERPL-MCNC: 0.88 MG/DL — SIGNIFICANT CHANGE UP (ref 0.5–1.3)
EGFR: 64 ML/MIN/1.73M2 — SIGNIFICANT CHANGE UP
EGFR: 64 ML/MIN/1.73M2 — SIGNIFICANT CHANGE UP
GLUCOSE SERPL-MCNC: 93 MG/DL — SIGNIFICANT CHANGE UP (ref 70–99)
POTASSIUM SERPL-MCNC: 4.9 MMOL/L — SIGNIFICANT CHANGE UP (ref 3.5–5.3)
POTASSIUM SERPL-SCNC: 4.9 MMOL/L — SIGNIFICANT CHANGE UP (ref 3.5–5.3)
SODIUM SERPL-SCNC: 139 MMOL/L — SIGNIFICANT CHANGE UP (ref 135–145)

## 2025-05-20 PROCEDURE — 93880 EXTRACRANIAL BILAT STUDY: CPT | Mod: 26

## 2025-05-20 RX ORDER — B1/B2/B3/B5/B6/B12/VIT C/FOLIC 500-0.5 MG
1 TABLET ORAL
Qty: 30 | Refills: 0
Start: 2025-05-20 | End: 2025-06-18

## 2025-05-20 RX ORDER — MEGESTROL ACETATE 40 MG
1 TABLET ORAL
Qty: 30 | Refills: 0
Start: 2025-05-20 | End: 2025-06-18

## 2025-05-20 RX ORDER — FLUTICASONE PROPIONATE 50 UG/1
1 SPRAY, METERED NASAL
Qty: 0 | Refills: 0 | DISCHARGE
Start: 2025-05-20

## 2025-05-20 RX ADMIN — Medication 40 MILLIGRAM(S): at 05:50

## 2025-05-20 RX ADMIN — Medication 2 MILLIGRAM(S): at 06:31

## 2025-05-20 RX ADMIN — Medication 2 MILLIGRAM(S): at 21:55

## 2025-05-20 RX ADMIN — Medication 650 MILLIGRAM(S): at 01:51

## 2025-05-20 RX ADMIN — Medication 81 MILLIGRAM(S): at 11:23

## 2025-05-20 RX ADMIN — BACLOFEN 10 MILLIGRAM(S): 10 INJECTION INTRATHECAL at 05:49

## 2025-05-20 RX ADMIN — Medication 1 TABLET(S): at 11:24

## 2025-05-20 RX ADMIN — Medication 25 MILLIGRAM(S): at 05:50

## 2025-05-20 RX ADMIN — Medication 500 MILLIGRAM(S): at 11:23

## 2025-05-20 RX ADMIN — Medication 20 MILLIGRAM(S): at 11:24

## 2025-05-20 RX ADMIN — Medication 2 MILLIGRAM(S): at 06:01

## 2025-05-20 RX ADMIN — FLUTICASONE PROPIONATE 1 SPRAY(S): 50 SPRAY, METERED NASAL at 05:48

## 2025-05-20 RX ADMIN — DULOXETINE 60 MILLIGRAM(S): 20 CAPSULE, DELAYED RELEASE ORAL at 11:24

## 2025-05-20 RX ADMIN — BACLOFEN 10 MILLIGRAM(S): 10 INJECTION INTRATHECAL at 17:45

## 2025-05-20 RX ADMIN — RIVAROXABAN 10 MILLIGRAM(S): 10 TABLET, FILM COATED ORAL at 11:25

## 2025-05-20 RX ADMIN — Medication 2 MILLIGRAM(S): at 22:25

## 2025-05-20 RX ADMIN — METOPROLOL SUCCINATE 25 MILLIGRAM(S): 50 TABLET, EXTENDED RELEASE ORAL at 05:49

## 2025-05-20 RX ADMIN — FLUTICASONE PROPIONATE 1 SPRAY(S): 50 SPRAY, METERED NASAL at 17:45

## 2025-05-20 RX ADMIN — Medication 650 MILLIGRAM(S): at 02:21

## 2025-05-21 RX ORDER — LOSARTAN POTASSIUM 100 MG/1
25 TABLET, FILM COATED ORAL DAILY
Refills: 0 | Status: DISCONTINUED | OUTPATIENT
Start: 2025-05-21 | End: 2025-05-23

## 2025-05-21 RX ADMIN — Medication 500 MILLIGRAM(S): at 12:03

## 2025-05-21 RX ADMIN — Medication 1 TABLET(S): at 12:04

## 2025-05-21 RX ADMIN — FLUTICASONE PROPIONATE 1 SPRAY(S): 50 SPRAY, METERED NASAL at 18:05

## 2025-05-21 RX ADMIN — Medication 2 MILLIGRAM(S): at 10:23

## 2025-05-21 RX ADMIN — Medication 2 MILLIGRAM(S): at 11:23

## 2025-05-21 RX ADMIN — Medication 81 MILLIGRAM(S): at 12:03

## 2025-05-21 RX ADMIN — Medication 2 MILLIGRAM(S): at 18:05

## 2025-05-21 RX ADMIN — RIVAROXABAN 10 MILLIGRAM(S): 10 TABLET, FILM COATED ORAL at 12:04

## 2025-05-21 RX ADMIN — Medication 2 MILLIGRAM(S): at 19:05

## 2025-05-21 RX ADMIN — BACLOFEN 10 MILLIGRAM(S): 10 INJECTION INTRATHECAL at 05:18

## 2025-05-21 RX ADMIN — BACLOFEN 10 MILLIGRAM(S): 10 INJECTION INTRATHECAL at 18:05

## 2025-05-21 RX ADMIN — Medication 20 MILLIGRAM(S): at 12:04

## 2025-05-21 RX ADMIN — DULOXETINE 60 MILLIGRAM(S): 20 CAPSULE, DELAYED RELEASE ORAL at 12:04

## 2025-05-21 RX ADMIN — Medication 40 MILLIGRAM(S): at 05:18

## 2025-05-21 RX ADMIN — METOPROLOL SUCCINATE 25 MILLIGRAM(S): 50 TABLET, EXTENDED RELEASE ORAL at 05:18

## 2025-05-21 RX ADMIN — Medication 25 MILLIGRAM(S): at 05:18

## 2025-05-22 RX ADMIN — FLUTICASONE PROPIONATE 1 SPRAY(S): 50 SPRAY, METERED NASAL at 17:16

## 2025-05-22 RX ADMIN — METOPROLOL SUCCINATE 25 MILLIGRAM(S): 50 TABLET, EXTENDED RELEASE ORAL at 05:31

## 2025-05-22 RX ADMIN — Medication 500 MILLIGRAM(S): at 11:12

## 2025-05-22 RX ADMIN — BACLOFEN 10 MILLIGRAM(S): 10 INJECTION INTRATHECAL at 05:31

## 2025-05-22 RX ADMIN — Medication 2 MILLIGRAM(S): at 06:01

## 2025-05-22 RX ADMIN — BACLOFEN 10 MILLIGRAM(S): 10 INJECTION INTRATHECAL at 17:15

## 2025-05-22 RX ADMIN — Medication 1 TABLET(S): at 11:13

## 2025-05-22 RX ADMIN — Medication 2 MILLIGRAM(S): at 05:31

## 2025-05-22 RX ADMIN — Medication 2 MILLIGRAM(S): at 21:56

## 2025-05-22 RX ADMIN — Medication 20 MILLIGRAM(S): at 11:13

## 2025-05-22 RX ADMIN — Medication 2 MILLIGRAM(S): at 22:26

## 2025-05-22 RX ADMIN — FLUTICASONE PROPIONATE 1 SPRAY(S): 50 SPRAY, METERED NASAL at 05:32

## 2025-05-22 RX ADMIN — RIVAROXABAN 10 MILLIGRAM(S): 10 TABLET, FILM COATED ORAL at 11:13

## 2025-05-22 RX ADMIN — Medication 40 MILLIGRAM(S): at 05:31

## 2025-05-22 RX ADMIN — Medication 81 MILLIGRAM(S): at 11:12

## 2025-05-22 RX ADMIN — DULOXETINE 60 MILLIGRAM(S): 20 CAPSULE, DELAYED RELEASE ORAL at 11:12

## 2025-05-22 RX ADMIN — LOSARTAN POTASSIUM 25 MILLIGRAM(S): 100 TABLET, FILM COATED ORAL at 05:31

## 2025-05-23 RX ORDER — FENTANYL CITRATE-0.9 % NACL/PF 100MCG/2ML
1 SYRINGE (ML) INTRAVENOUS
Refills: 0 | Status: DISCONTINUED | OUTPATIENT
Start: 2025-05-23 | End: 2025-05-24

## 2025-05-23 RX ADMIN — Medication 650 MILLIGRAM(S): at 04:32

## 2025-05-23 RX ADMIN — Medication 500 MILLIGRAM(S): at 11:26

## 2025-05-23 RX ADMIN — Medication 1 PATCH: at 18:56

## 2025-05-23 RX ADMIN — FLUTICASONE PROPIONATE 1 SPRAY(S): 50 SPRAY, METERED NASAL at 17:39

## 2025-05-23 RX ADMIN — LOSARTAN POTASSIUM 25 MILLIGRAM(S): 100 TABLET, FILM COATED ORAL at 05:56

## 2025-05-23 RX ADMIN — Medication 81 MILLIGRAM(S): at 11:26

## 2025-05-23 RX ADMIN — BACLOFEN 10 MILLIGRAM(S): 10 INJECTION INTRATHECAL at 05:56

## 2025-05-23 RX ADMIN — BACLOFEN 10 MILLIGRAM(S): 10 INJECTION INTRATHECAL at 17:39

## 2025-05-23 RX ADMIN — DULOXETINE 60 MILLIGRAM(S): 20 CAPSULE, DELAYED RELEASE ORAL at 11:25

## 2025-05-23 RX ADMIN — FLUTICASONE PROPIONATE 1 SPRAY(S): 50 SPRAY, METERED NASAL at 05:56

## 2025-05-23 RX ADMIN — Medication 650 MILLIGRAM(S): at 05:02

## 2025-05-23 RX ADMIN — Medication 1 TABLET(S): at 11:26

## 2025-05-23 RX ADMIN — Medication 40 MILLIGRAM(S): at 05:56

## 2025-05-23 RX ADMIN — Medication 2 MILLIGRAM(S): at 12:32

## 2025-05-23 RX ADMIN — Medication 75 MILLILITER(S): at 13:45

## 2025-05-23 RX ADMIN — Medication 2 MILLIGRAM(S): at 11:33

## 2025-05-23 RX ADMIN — Medication 20 MILLIGRAM(S): at 11:25

## 2025-05-23 RX ADMIN — RIVAROXABAN 10 MILLIGRAM(S): 10 TABLET, FILM COATED ORAL at 11:26

## 2025-05-23 RX ADMIN — Medication 1 PATCH: at 14:04

## 2025-05-24 VITALS
TEMPERATURE: 98 F | HEART RATE: 68 BPM | DIASTOLIC BLOOD PRESSURE: 66 MMHG | RESPIRATION RATE: 18 BRPM | OXYGEN SATURATION: 98 % | SYSTOLIC BLOOD PRESSURE: 108 MMHG

## 2025-05-24 PROCEDURE — 70450 CT HEAD/BRAIN W/O DYE: CPT

## 2025-05-24 PROCEDURE — 97162 PT EVAL MOD COMPLEX 30 MIN: CPT

## 2025-05-24 PROCEDURE — 82330 ASSAY OF CALCIUM: CPT

## 2025-05-24 PROCEDURE — 96374 THER/PROPH/DIAG INJ IV PUSH: CPT

## 2025-05-24 PROCEDURE — 83735 ASSAY OF MAGNESIUM: CPT

## 2025-05-24 PROCEDURE — 85610 PROTHROMBIN TIME: CPT

## 2025-05-24 PROCEDURE — 85025 COMPLETE CBC W/AUTO DIFF WBC: CPT

## 2025-05-24 PROCEDURE — 93880 EXTRACRANIAL BILAT STUDY: CPT

## 2025-05-24 PROCEDURE — 71045 X-RAY EXAM CHEST 1 VIEW: CPT

## 2025-05-24 PROCEDURE — 82962 GLUCOSE BLOOD TEST: CPT

## 2025-05-24 PROCEDURE — 80053 COMPREHEN METABOLIC PANEL: CPT

## 2025-05-24 PROCEDURE — 85027 COMPLETE CBC AUTOMATED: CPT

## 2025-05-24 PROCEDURE — 99285 EMERGENCY DEPT VISIT HI MDM: CPT | Mod: 25

## 2025-05-24 PROCEDURE — 83690 ASSAY OF LIPASE: CPT

## 2025-05-24 PROCEDURE — 82435 ASSAY OF BLOOD CHLORIDE: CPT

## 2025-05-24 PROCEDURE — 85018 HEMOGLOBIN: CPT

## 2025-05-24 PROCEDURE — 83880 ASSAY OF NATRIURETIC PEPTIDE: CPT

## 2025-05-24 PROCEDURE — 71275 CT ANGIOGRAPHY CHEST: CPT

## 2025-05-24 PROCEDURE — 93005 ELECTROCARDIOGRAM TRACING: CPT

## 2025-05-24 PROCEDURE — 85014 HEMATOCRIT: CPT

## 2025-05-24 PROCEDURE — 82947 ASSAY GLUCOSE BLOOD QUANT: CPT

## 2025-05-24 PROCEDURE — 82550 ASSAY OF CK (CPK): CPT

## 2025-05-24 PROCEDURE — 84295 ASSAY OF SERUM SODIUM: CPT

## 2025-05-24 PROCEDURE — 84484 ASSAY OF TROPONIN QUANT: CPT

## 2025-05-24 PROCEDURE — 36415 COLL VENOUS BLD VENIPUNCTURE: CPT

## 2025-05-24 PROCEDURE — 94640 AIRWAY INHALATION TREATMENT: CPT

## 2025-05-24 PROCEDURE — 96375 TX/PRO/DX INJ NEW DRUG ADDON: CPT

## 2025-05-24 PROCEDURE — 93306 TTE W/DOPPLER COMPLETE: CPT

## 2025-05-24 PROCEDURE — 82553 CREATINE MB FRACTION: CPT

## 2025-05-24 PROCEDURE — 83605 ASSAY OF LACTIC ACID: CPT

## 2025-05-24 PROCEDURE — 85730 THROMBOPLASTIN TIME PARTIAL: CPT

## 2025-05-24 PROCEDURE — 82803 BLOOD GASES ANY COMBINATION: CPT

## 2025-05-24 PROCEDURE — 80048 BASIC METABOLIC PNL TOTAL CA: CPT

## 2025-05-24 PROCEDURE — 84132 ASSAY OF SERUM POTASSIUM: CPT

## 2025-05-24 RX ORDER — MIDODRINE HYDROCHLORIDE 5 MG/1
1 TABLET ORAL
Qty: 90 | Refills: 0
Start: 2025-05-24 | End: 2025-06-22

## 2025-05-24 RX ORDER — MIDODRINE HYDROCHLORIDE 5 MG/1
5 TABLET ORAL THREE TIMES A DAY
Refills: 0 | Status: DISCONTINUED | OUTPATIENT
Start: 2025-05-24 | End: 2025-05-24

## 2025-05-24 RX ADMIN — Medication 500 MILLIGRAM(S): at 11:27

## 2025-05-24 RX ADMIN — Medication 1 PATCH: at 07:00

## 2025-05-24 RX ADMIN — Medication 1 TABLET(S): at 11:26

## 2025-05-24 RX ADMIN — MIDODRINE HYDROCHLORIDE 5 MILLIGRAM(S): 5 TABLET ORAL at 12:19

## 2025-05-24 RX ADMIN — Medication 81 MILLIGRAM(S): at 11:27

## 2025-05-24 RX ADMIN — Medication 20 MILLIGRAM(S): at 11:27

## 2025-05-24 RX ADMIN — METOPROLOL SUCCINATE 25 MILLIGRAM(S): 50 TABLET, EXTENDED RELEASE ORAL at 05:04

## 2025-05-24 RX ADMIN — BACLOFEN 10 MILLIGRAM(S): 10 INJECTION INTRATHECAL at 05:04

## 2025-05-24 RX ADMIN — DULOXETINE 60 MILLIGRAM(S): 20 CAPSULE, DELAYED RELEASE ORAL at 11:26

## 2025-05-24 RX ADMIN — Medication 2 MILLIGRAM(S): at 06:00

## 2025-05-24 RX ADMIN — Medication 40 MILLIGRAM(S): at 05:04

## 2025-05-24 RX ADMIN — RIVAROXABAN 10 MILLIGRAM(S): 10 TABLET, FILM COATED ORAL at 11:27

## 2025-05-24 RX ADMIN — FLUTICASONE PROPIONATE 1 SPRAY(S): 50 SPRAY, METERED NASAL at 05:04

## 2025-05-24 RX ADMIN — Medication 2 MILLIGRAM(S): at 05:00
